# Patient Record
Sex: FEMALE | Race: WHITE | NOT HISPANIC OR LATINO | Employment: UNEMPLOYED | URBAN - METROPOLITAN AREA
[De-identification: names, ages, dates, MRNs, and addresses within clinical notes are randomized per-mention and may not be internally consistent; named-entity substitution may affect disease eponyms.]

---

## 2017-10-22 LAB — HEP C VIRUS AB: 0.1

## 2018-04-27 DIAGNOSIS — Z86.19 HISTORY OF PCR DNA POSITIVE FOR HSV2: ICD-10-CM

## 2018-04-27 DIAGNOSIS — O26.893 RH NEGATIVE STATUS DURING PREGNANCY IN THIRD TRIMESTER: ICD-10-CM

## 2018-04-27 DIAGNOSIS — Z67.91 RH NEGATIVE STATUS DURING PREGNANCY IN THIRD TRIMESTER: ICD-10-CM

## 2018-04-27 DIAGNOSIS — Z86.19 HISTORY OF TRICHOMONIASIS: ICD-10-CM

## 2018-04-27 PROBLEM — O26.899 RH NEGATIVE STATUS DURING PREGNANCY: Status: ACTIVE | Noted: 2018-04-27

## 2018-04-27 PROBLEM — O36.0920: Status: ACTIVE | Noted: 2018-04-27

## 2018-04-27 PROBLEM — B00.9 HERPES SIMPLEX TYPE 2 INFECTION: Status: ACTIVE | Noted: 2018-04-27

## 2018-04-27 RX ORDER — OXYTOCIN/RINGER'S LACTATE 20/1000 ML
2 PLASTIC BAG, INJECTION (ML) INTRAVENOUS CONTINUOUS
Status: DISCONTINUED | OUTPATIENT
Start: 2018-04-28 | End: 2018-05-01 | Stop reason: HOSPADM

## 2018-04-27 RX ORDER — SODIUM CHLORIDE, SODIUM LACTATE, POTASSIUM CHLORIDE, CALCIUM CHLORIDE 600; 310; 30; 20 MG/100ML; MG/100ML; MG/100ML; MG/100ML
INJECTION, SOLUTION INTRAVENOUS CONTINUOUS
Status: DISCONTINUED | OUTPATIENT
Start: 2018-04-27 | End: 2018-05-01 | Stop reason: HOSPADM

## 2018-04-27 RX ORDER — TERBUTALINE SULFATE 1 MG/ML
0.25 INJECTION SUBCUTANEOUS
Status: DISCONTINUED | OUTPATIENT
Start: 2018-04-27 | End: 2018-05-01 | Stop reason: HOSPADM

## 2018-04-27 RX ORDER — OXYTOCIN/RINGER'S LACTATE 20/1000 ML
333 PLASTIC BAG, INJECTION (ML) INTRAVENOUS CONTINUOUS
Status: SHIPPED | OUTPATIENT
Start: 2018-04-27 | End: 2018-04-27

## 2018-06-14 LAB — PAP RECOMMENDATION EXT: NORMAL

## 2019-08-27 ENCOUNTER — HOSPITAL ENCOUNTER (EMERGENCY)
Facility: HOSPITAL | Age: 24
Discharge: HOME | End: 2019-08-27

## 2019-08-27 VITALS
SYSTOLIC BLOOD PRESSURE: 116 MMHG | BODY MASS INDEX: 36.1 KG/M2 | HEIGHT: 67 IN | TEMPERATURE: 98 F | RESPIRATION RATE: 18 BRPM | OXYGEN SATURATION: 98 % | DIASTOLIC BLOOD PRESSURE: 88 MMHG | HEART RATE: 75 BPM | WEIGHT: 230 LBS

## 2019-08-27 DIAGNOSIS — M79.18 LUMBAR MUSCLE PAIN: Primary | ICD-10-CM

## 2019-08-27 PROCEDURE — 99284 EMERGENCY DEPT VISIT MOD MDM: CPT

## 2019-08-27 RX ORDER — CYCLOBENZAPRINE HCL 10 MG
10 TABLET ORAL 3 TIMES DAILY PRN
Qty: 12 TABLET | Refills: 0 | Status: SHIPPED | OUTPATIENT
Start: 2019-08-27 | End: 2019-09-01

## 2019-08-27 RX ORDER — KETOROLAC TROMETHAMINE 10 MG/1
10 TABLET, FILM COATED ORAL EVERY 6 HOURS PRN
Qty: 20 TABLET | Refills: 0 | Status: SHIPPED | OUTPATIENT
Start: 2019-08-27 | End: 2019-09-01

## 2019-08-27 RX ORDER — SERTRALINE HYDROCHLORIDE 100 MG/1
100 TABLET, FILM COATED ORAL DAILY
COMMUNITY
End: 2020-03-13 | Stop reason: CLARIF

## 2019-08-27 RX ORDER — ARIPIPRAZOLE 5 MG/1
5 TABLET ORAL DAILY
COMMUNITY
End: 2020-03-13 | Stop reason: CLARIF

## 2019-08-27 RX ORDER — PROPRANOLOL HYDROCHLORIDE 10 MG/1
10 TABLET ORAL 3 TIMES DAILY
COMMUNITY
End: 2020-03-13 | Stop reason: CLARIF

## 2019-08-27 RX ORDER — OXCARBAZEPINE 150 MG/1
150 TABLET, FILM COATED ORAL 3 TIMES DAILY
COMMUNITY
End: 2020-03-13 | Stop reason: CLARIF

## 2019-08-27 NOTE — ED PROVIDER NOTES
Encounter Date: 8/27/2019       History     Chief Complaint   Patient presents with    Back Pain     Gustavo Perry is a 24 y.o female with PMHx including anxiety, bipolar and depression. She presents to ED with back pain    Patient was shoveling dirt to bury dog yesterday morning. She started with back pain shortly after. She denies specific injury or trauama    She reports constant, sharp pain in right lower back. Pain does not radiate in nature. Pain is worse with weight bearing     She denies urinary symptoms     No fever, body aches or chills    No numbness or tingling. No urinary or bowel incontinence    Lower extremities with normal neurovascular status        Review of patient's allergies indicates:   Allergen Reactions    Sulfa (sulfonamide antibiotics) Hives     Past Medical History:   Diagnosis Date    Anxiety     Bipolar 1 disorder, depressed     Depression      History reviewed. No pertinent surgical history.  History reviewed. No pertinent family history.  Social History     Tobacco Use    Smoking status: Current Every Day Smoker   Substance Use Topics    Alcohol use: Yes    Drug use: Never     Review of Systems   Constitutional: Negative.  Negative for fever.   HENT: Negative.  Negative for sore throat.    Eyes: Negative.    Respiratory: Negative.  Negative for shortness of breath.    Cardiovascular: Negative.  Negative for chest pain.   Gastrointestinal: Negative.  Negative for nausea.   Endocrine: Negative.    Genitourinary: Negative.  Negative for dysuria.   Musculoskeletal: Positive for arthralgias (right back pain). Negative for back pain.   Skin: Negative for rash.   Allergic/Immunologic: Negative.    Neurological: Negative.  Negative for weakness.   Hematological: Negative.  Does not bruise/bleed easily.   Psychiatric/Behavioral: Negative.    All other systems reviewed and are negative.      Physical Exam     Initial Vitals [08/27/19 1336]   BP Pulse Resp Temp SpO2   116/88 75 18 98.4 °F  (36.9 °C) 98 %      MAP       --         Physical Exam    Nursing note and vitals reviewed.  Constitutional: She appears well-developed and well-nourished.   HENT:   Head: Normocephalic.   Eyes: Pupils are equal, round, and reactive to light.   Neck: Normal range of motion.   Cardiovascular: Normal rate, regular rhythm and normal heart sounds.   Pulmonary/Chest: Breath sounds normal.   Musculoskeletal: Normal range of motion. She exhibits tenderness.        Lumbar back: She exhibits tenderness, pain and spasm. She exhibits normal range of motion, no bony tenderness, no swelling, no edema, no deformity, no laceration and normal pulse.        Back:    Neurological: She is alert and oriented to person, place, and time. GCS score is 15. GCS eye subscore is 4. GCS verbal subscore is 5. GCS motor subscore is 6.   Skin: Skin is warm and dry. Capillary refill takes less than 2 seconds.   Psychiatric: She has a normal mood and affect. Her behavior is normal. Judgment and thought content normal.         ED Course   Procedures  Labs Reviewed - No data to display       Imaging Results    None          Medical Decision Making:   Initial Assessment:   Patient with back pain    Patient was shoveling dirt to bury dog yesterday morning. She started with back pain shortly after. She denies specific injury or trauama    She reports constant, sharp pain in right lower back. Pain does not radiate in nature. Pain is worse with weight bearing     She denies urinary symptoms     No fever, body aches or chills    No numbness or tingling. No urinary or bowel incontinence    Lower extremities with normal neurovascular status    Differential Diagnosis:   Lumbar spine bone fracture, muscle strain    ED Management:    Discussed physical exam findings with patient  No acute emergent medical condition identified at this time to warrant further testing/diagnostics  At this time, I believe the patient is clinically stable for discharge.   Patient to  follow up with PCP in 1-2 days.  The patient acknowledges that close follow up with a MD is required after all ER visits  Pt given instructions; take all medications prescribed in the ER as directed.   Patient agrees to comply with all instruction and direction given in the ER  Pt agrees to return to ER if any symptoms reoccur                               Clinical Impression:       ICD-10-CM ICD-9-CM   1. Lumbar muscle pain M79.18 724.2         Disposition:   Disposition: Discharged  Condition: Stable                        Aydee Aggarwal NP  08/27/19 6872

## 2019-11-13 ENCOUNTER — HOSPITAL ENCOUNTER (EMERGENCY)
Facility: HOSPITAL | Age: 24
Discharge: HOME | End: 2019-11-13
Attending: INTERNAL MEDICINE

## 2019-11-13 VITALS
OXYGEN SATURATION: 98 % | BODY MASS INDEX: 37.04 KG/M2 | WEIGHT: 236 LBS | DIASTOLIC BLOOD PRESSURE: 80 MMHG | HEIGHT: 67 IN | SYSTOLIC BLOOD PRESSURE: 130 MMHG | TEMPERATURE: 98 F | HEART RATE: 97 BPM | RESPIRATION RATE: 20 BRPM

## 2019-11-13 DIAGNOSIS — N30.01 ACUTE CYSTITIS WITH HEMATURIA: ICD-10-CM

## 2019-11-13 DIAGNOSIS — M54.6 ACUTE BILATERAL THORACIC BACK PAIN: Primary | ICD-10-CM

## 2019-11-13 LAB
BACTERIA #/AREA URNS HPF: ABNORMAL /HPF
BILIRUB UR QL STRIP: NEGATIVE
CLARITY UR: CLEAR
COLOR UR: YELLOW
GLUCOSE UR QL STRIP: NEGATIVE
HGB UR QL STRIP: ABNORMAL
KETONES UR QL STRIP: NEGATIVE
LEUKOCYTE ESTERASE UR QL STRIP: NEGATIVE
MICROSCOPIC COMMENT: ABNORMAL
NITRITE UR QL STRIP: NEGATIVE
PH UR STRIP: 5 [PH] (ref 5–8)
PROT UR QL STRIP: ABNORMAL
RBC #/AREA URNS HPF: 25 /HPF (ref 0–4)
SP GR UR STRIP: 1.02 (ref 1–1.03)
SQUAMOUS #/AREA URNS HPF: 12 /HPF
URN SPEC COLLECT METH UR: ABNORMAL
UROBILINOGEN UR STRIP-ACNC: NEGATIVE EU/DL
WBC #/AREA URNS HPF: 10 /HPF (ref 0–5)

## 2019-11-13 PROCEDURE — 99284 EMERGENCY DEPT VISIT MOD MDM: CPT | Mod: 25

## 2019-11-13 PROCEDURE — 96372 THER/PROPH/DIAG INJ SC/IM: CPT

## 2019-11-13 PROCEDURE — 63600175 PHARM REV CODE 636 W HCPCS: Performed by: NURSE PRACTITIONER

## 2019-11-13 PROCEDURE — 81000 URINALYSIS NONAUTO W/SCOPE: CPT

## 2019-11-13 RX ORDER — CEPHALEXIN 500 MG/1
500 CAPSULE ORAL 4 TIMES DAILY
Qty: 20 CAPSULE | Refills: 0 | Status: SHIPPED | OUTPATIENT
Start: 2019-11-13 | End: 2019-11-18

## 2019-11-13 RX ORDER — PHENAZOPYRIDINE HYDROCHLORIDE 200 MG/1
200 TABLET, FILM COATED ORAL 3 TIMES DAILY
Qty: 6 TABLET | Refills: 0 | Status: SHIPPED | OUTPATIENT
Start: 2019-11-13 | End: 2019-11-15

## 2019-11-13 RX ORDER — KETOROLAC TROMETHAMINE 30 MG/ML
30 INJECTION, SOLUTION INTRAMUSCULAR; INTRAVENOUS
Status: COMPLETED | OUTPATIENT
Start: 2019-11-13 | End: 2019-11-13

## 2019-11-13 RX ADMIN — KETOROLAC TROMETHAMINE 30 MG: 30 INJECTION, SOLUTION INTRAMUSCULAR; INTRAVENOUS at 01:11

## 2019-11-13 NOTE — ED PROVIDER NOTES
"Encounter Date: 11/13/2019       History     Chief Complaint   Patient presents with    Flank Pain     Patient complaining of left flank pain, also has a knot on her chest x2 months.     Gustavo Perry is a 24 y.o female with no sign PMMHx. She presents to ED with complaint of back pain    Patient reports that she bent over last night to  a heater and felt sudden, sharp pain in back.    Pain to thoracic region does not radiate. Patient reports pain only with movement.    No abdominal or pelvic pain. No N/V/D    She has not attempted to take any over the counter medication for her symptoms    She reports pain with urination. No urgency or frequency    No vaginal discharge. She is currently on her menstrual cycle    No fever, body aches, chills, chest pain, dyspnea or rash    She also reports "lump" on chest for 2 months        Review of patient's allergies indicates:   Allergen Reactions    Sulfa (sulfonamide antibiotics) Hives     Past Medical History:   Diagnosis Date    Anxiety     Bipolar 1 disorder, depressed     Depression      History reviewed. No pertinent surgical history.  History reviewed. No pertinent family history.  Social History     Tobacco Use    Smoking status: Current Every Day Smoker   Substance Use Topics    Alcohol use: Yes    Drug use: Never     Review of Systems   Constitutional: Negative.  Negative for fever.   HENT: Negative.  Negative for sore throat.    Eyes: Negative.    Respiratory: Negative.  Negative for shortness of breath.    Cardiovascular: Negative.  Negative for chest pain.   Gastrointestinal: Negative.  Negative for nausea.   Genitourinary: Positive for flank pain. Negative for dysuria.   Musculoskeletal: Positive for back pain.   Skin: Negative for rash.   Allergic/Immunologic: Negative.    Neurological: Negative.  Negative for weakness.   Hematological: Negative.  Does not bruise/bleed easily.   Psychiatric/Behavioral: Negative.    All other systems reviewed and are " negative.      Physical Exam     Initial Vitals [11/13/19 1230]   BP Pulse Resp Temp SpO2   130/80 97 20 98.4 °F (36.9 °C) 98 %      MAP       --         Physical Exam    Nursing note and vitals reviewed.  Constitutional: She appears well-developed and well-nourished.   HENT:   Head: Normocephalic.   Eyes: Conjunctivae are normal.   Neck: Normal range of motion. Neck supple.   Cardiovascular: Normal rate.   Pulmonary/Chest: Breath sounds normal.   Abdominal: Soft. Bowel sounds are normal. She exhibits no distension. There is tenderness. There is no rebound and no guarding.       Musculoskeletal:        Cervical back: Normal.        Thoracic back: She exhibits tenderness, bony tenderness, pain and spasm. She exhibits normal range of motion, no swelling, no edema, no deformity, no laceration and normal pulse.        Lumbar back: Normal.        Back:    Neurological: She is alert and oriented to person, place, and time.   Skin: Skin is warm. Capillary refill takes less than 2 seconds.        Psychiatric: She has a normal mood and affect. Her behavior is normal. Judgment and thought content normal.         ED Course   Procedures  Labs Reviewed   URINALYSIS, REFLEX TO URINE CULTURE - Abnormal; Notable for the following components:       Result Value    Protein, UA Trace (*)     Occult Blood UA 3+ (*)     All other components within normal limits    Narrative:     Preferred Collection Type->Urine, Clean Catch   URINALYSIS MICROSCOPIC - Abnormal; Notable for the following components:    RBC, UA 25 (*)     WBC, UA 10 (*)     Bacteria Moderate (*)     All other components within normal limits    Narrative:     Preferred Collection Type->Urine, Clean Catch          Imaging Results    None          Medical Decision Making:   Initial Assessment:   With complaint of back pain    Patient reports that she bent over last night to  a heater and felt sudden, sharp pain in back.    Pain to thoracic region does not radiate.  "Patient reports pain only with movement.    No abdominal or pelvic pain. No N/V/D    She has not attempted to take any over the counter medication for her symptoms    She reports pain with urination. No urgency or frequency    No vaginal discharge. She is currently on her menstrual cycle    No fever, body aches, chills, chest pain, dyspnea or rash    She also reports "lump" on chest for 2 months    Differential Diagnosis:   Bone injury, muscle strain, kidney stone      UTI, pyelonephritis   ED Management:  UA microscopic with 10 wbc and 25 RBC- She is on her menstrual cycle     Will start antibiotic for early UTI    Toradol for pain    Discussed physical exam findings with patient  No acute emergent medical condition identified at this time to warrant further testing/diagnostics  At this time, I believe the patient is clinically stable for discharge.   Patient to follow up with PCP in 1-2 days.  The patient acknowledges that close follow up with a MD is required after all ER visits  Pt given instructions; take all medications prescribed in the ER as directed.   Patient agrees to comply with all instruction and direction given in the ER  Pt agrees to return to ER if any symptoms reoccur                                              Clinical Impression:       ICD-10-CM ICD-9-CM   1. Acute bilateral thoracic back pain M54.6 724.1   2. Acute cystitis with hematuria N30.01 595.0                             Aydee Aggarwal NP  11/13/19 1400    "

## 2020-03-13 ENCOUNTER — HOSPITAL ENCOUNTER (EMERGENCY)
Facility: HOSPITAL | Age: 25
Discharge: HOME | End: 2020-03-13
Payer: COMMERCIAL

## 2020-03-13 VITALS
TEMPERATURE: 98 F | DIASTOLIC BLOOD PRESSURE: 87 MMHG | SYSTOLIC BLOOD PRESSURE: 132 MMHG | WEIGHT: 232 LBS | BODY MASS INDEX: 36.41 KG/M2 | HEIGHT: 67 IN | HEART RATE: 92 BPM | OXYGEN SATURATION: 98 % | RESPIRATION RATE: 20 BRPM

## 2020-03-13 DIAGNOSIS — R68.89 FLU-LIKE SYMPTOMS: Primary | ICD-10-CM

## 2020-03-13 LAB
GROUP A STREP, MOLECULAR: NEGATIVE
INFLUENZA A, MOLECULAR: NEGATIVE
INFLUENZA B, MOLECULAR: NEGATIVE
SPECIMEN SOURCE: NORMAL

## 2020-03-13 PROCEDURE — 87502 INFLUENZA DNA AMP PROBE: CPT

## 2020-03-13 PROCEDURE — 87651 STREP A DNA AMP PROBE: CPT

## 2020-03-13 PROCEDURE — 99282 EMERGENCY DEPT VISIT SF MDM: CPT

## 2020-03-13 NOTE — ED PROVIDER NOTES
Encounter Date: 3/13/2020       History   No chief complaint on file.    Gustavo Perry is a 24 y.o female with significant past medical history including anxiety, bipolar and depression. She presents to ED with low-grade fever, cough, congestion, sore throat and fatigue    No wheezing or respiratory distress    No abdominal pain. No nausea, vomiting or diarrhea. She is tolerating fluids well    She is taking any over the counter mediations to treat symptoms    No recent travels or exposure to person with known coronavirus.    She reports that her child have influenza    She has had influenza vaccination this season          Review of patient's allergies indicates:   Allergen Reactions    Sulfa (sulfonamide antibiotics) Hives     Past Medical History:   Diagnosis Date    Anxiety     Bipolar 1 disorder, depressed     Depression      No past surgical history on file.  No family history on file.  Social History     Tobacco Use    Smoking status: Current Every Day Smoker   Substance Use Topics    Alcohol use: Yes    Drug use: Never     Review of Systems   Constitutional: Positive for fatigue and fever. Negative for activity change, appetite change, chills, diaphoresis and unexpected weight change.   HENT: Positive for congestion, postnasal drip, rhinorrhea and sore throat. Negative for dental problem, drooling, ear discharge, ear pain, facial swelling, hearing loss, mouth sores, nosebleeds, sinus pressure, sinus pain and sneezing.    Eyes: Negative.    Respiratory: Positive for cough. Negative for apnea, choking, chest tightness, shortness of breath, wheezing and stridor.    Cardiovascular: Negative.    Gastrointestinal: Negative.    Endocrine: Negative.    Genitourinary: Negative.    Musculoskeletal: Negative.    Allergic/Immunologic: Negative.    Neurological: Negative.    Hematological: Negative.    Psychiatric/Behavioral: Negative.    All other systems reviewed and are negative.      Physical Exam     Initial  Vitals   BP Pulse Resp Temp SpO2   -- -- -- -- --      MAP       --         Physical Exam    Nursing note and vitals reviewed.  Constitutional: She appears well-developed and well-nourished. She is not diaphoretic. No distress.   HENT:   Head: Normocephalic.   Right Ear: External ear normal.   Left Ear: External ear normal.   Nose: Nose normal.   Mouth/Throat: Oropharynx is clear and moist.   Eyes: Conjunctivae are normal.   Neck: Normal range of motion.   Cardiovascular: Normal rate.   Pulmonary/Chest: Breath sounds normal.   Abdominal: Soft. Bowel sounds are normal.   Musculoskeletal: Normal range of motion.   Neurological: She is alert and oriented to person, place, and time. GCS score is 15. GCS eye subscore is 4. GCS verbal subscore is 5. GCS motor subscore is 6.   Skin: Skin is warm. Capillary refill takes less than 2 seconds.   Psychiatric: She has a normal mood and affect. Her behavior is normal. Judgment and thought content normal.         ED Course   Procedures  Labs Reviewed - No data to display       Imaging Results    None          Medical Decision Making:   Initial Assessment:   Patient with low-grade fever, cough, congestion, sore throat and fatigue    No wheezing or respiratory distress    No abdominal pain. No nausea, vomiting or diarrhea. She is tolerating fluids well    She is taking any over the counter mediations to treat symptoms    No recent travels or exposure to person with known coronavirus.    She reports that her child have influenza    She has had influenza vaccination this season      Differential Diagnosis:   URI, strep, influenza, sinusitis pneumonia, bronchitis, coronavirus  ED Management:  Strep and influenza are negative    Discussed the possibility of influenza despite negative test due to recent exposure. Patient declined antiviral    Discussed physical exam findings with patient  No acute emergent medical condition identified at this time to warrant further  testing/diagnostics  At this time, I believe the patient is clinically stable for discharge.   Patient to follow up with PCP in 1-2 days.  The patient acknowledges that close follow up with a MD is required after all ER visits  Pt given instructions; take all medications prescribed in the ER as directed.   Patient agrees to comply with all instruction and direction given in the ER  Pt agrees to return to ER if any symptoms reoccur                                               Clinical Impression:       ICD-10-CM ICD-9-CM   1. Flu-like symptoms R68.89 780.99                                Aydee Aggarwal NP  03/13/20 1952

## 2020-04-17 ENCOUNTER — HOSPITAL ENCOUNTER (EMERGENCY)
Facility: HOSPITAL | Age: 25
Discharge: HOME | End: 2020-04-17
Attending: EMERGENCY MEDICINE
Payer: COMMERCIAL

## 2020-04-17 VITALS
TEMPERATURE: 98 F | WEIGHT: 229 LBS | OXYGEN SATURATION: 98 % | RESPIRATION RATE: 18 BRPM | SYSTOLIC BLOOD PRESSURE: 131 MMHG | DIASTOLIC BLOOD PRESSURE: 78 MMHG | HEIGHT: 67 IN | BODY MASS INDEX: 35.94 KG/M2 | HEART RATE: 92 BPM

## 2020-04-17 DIAGNOSIS — R05.9 COUGH: ICD-10-CM

## 2020-04-17 DIAGNOSIS — R09.89 CHEST CONGESTION: Primary | ICD-10-CM

## 2020-04-17 DIAGNOSIS — B34.9 ACUTE VIRAL SYNDROME: ICD-10-CM

## 2020-04-17 LAB
B-HCG UR QL: NEGATIVE
BACTERIA #/AREA URNS HPF: ABNORMAL /HPF
BILIRUB UR QL STRIP: NEGATIVE
CLARITY UR: CLEAR
COLOR UR: YELLOW
GLUCOSE UR QL STRIP: NEGATIVE
HGB UR QL STRIP: NEGATIVE
INFLUENZA A, MOLECULAR: NEGATIVE
INFLUENZA B, MOLECULAR: NEGATIVE
KETONES UR QL STRIP: NEGATIVE
LEUKOCYTE ESTERASE UR QL STRIP: ABNORMAL
MICROSCOPIC COMMENT: ABNORMAL
NITRITE UR QL STRIP: NEGATIVE
PH UR STRIP: 6 [PH] (ref 5–8)
PROT UR QL STRIP: NEGATIVE
RBC #/AREA URNS HPF: 1 /HPF (ref 0–4)
SARS-COV-2 RDRP RESP QL NAA+PROBE: NEGATIVE
SP GR UR STRIP: 1.01 (ref 1–1.03)
SPECIMEN SOURCE: NORMAL
SQUAMOUS #/AREA URNS HPF: 10 /HPF
URN SPEC COLLECT METH UR: ABNORMAL
UROBILINOGEN UR STRIP-ACNC: NEGATIVE EU/DL
WBC #/AREA URNS HPF: 3 /HPF (ref 0–5)

## 2020-04-17 PROCEDURE — 81000 URINALYSIS NONAUTO W/SCOPE: CPT

## 2020-04-17 PROCEDURE — 71045 X-RAY EXAM CHEST 1 VIEW: CPT | Mod: 26,,, | Performed by: RADIOLOGY

## 2020-04-17 PROCEDURE — 87502 INFLUENZA DNA AMP PROBE: CPT

## 2020-04-17 PROCEDURE — U0002 COVID-19 LAB TEST NON-CDC: HCPCS

## 2020-04-17 PROCEDURE — 99283 EMERGENCY DEPT VISIT LOW MDM: CPT | Mod: 25

## 2020-04-17 PROCEDURE — 71045 X-RAY EXAM CHEST 1 VIEW: CPT | Mod: TC,FY

## 2020-04-17 PROCEDURE — 81025 URINE PREGNANCY TEST: CPT

## 2020-04-17 PROCEDURE — 71045 XR CHEST 1 VIEW: ICD-10-PCS | Mod: 26,,, | Performed by: RADIOLOGY

## 2020-04-17 RX ORDER — BENZONATATE 100 MG/1
100 CAPSULE ORAL 3 TIMES DAILY PRN
Qty: 20 CAPSULE | Refills: 0 | Status: SHIPPED | OUTPATIENT
Start: 2020-04-17 | End: 2020-04-27

## 2020-04-17 NOTE — DISCHARGE INSTRUCTIONS
Take OTC Motrin/Tylenol as needed for pain/fever    Take all medications as prescribed.  Follow-up with your primary care provider as discussed.  Please remember that you had a visit to the emergency room today and this does not substitute as primary care services for ongoing management because emergency services is a snap shot in time.  Should you have any worsening condition that requires emergency services do not hesitate to return to the ER.

## 2020-04-17 NOTE — ED PROVIDER NOTES
Encounter Date: 4/17/2020       History     Chief Complaint   Patient presents with    Influenza     body aches sore throat for several days     23yo WF w/ c/o subjective fever, body aches, cough & congestion x2 days; denies exacerbating or alleviating factors, no OTC meds taken, started slowly & progressing detailed as moderate presently -- denies  headache, dizziness, syncope, vision changes, neck pain, painful/difficult swallowing, chest pain, shortness breath, abdominal pain, nausea/vomiting/diarrhea, hematuria/dysuria -- no previous evaluation has been performed nor has PCP been contacted for today's concerns    Past medical/surgical history, allergies & current medications reviewed with patient    LMP x3 wks ago, normal    COVID-19 SCREENING  Known SARS-CoV2 exposure:  No  State of residence:  [place]  Recent travel out of state:  No  Recent travel outside of the [place]:  No  Length of symptoms:  2 days  Motrin/Tylenol in past 4 hours:  No  Difficulty breathing:  No  Smokes:  Yes      The history is provided by the patient. No  was used.     Review of patient's allergies indicates:   Allergen Reactions    Sulfa (sulfonamide antibiotics) Hives     Past Medical History:   Diagnosis Date    Anxiety     Bipolar 1 disorder, depressed     Depression      History reviewed. No pertinent surgical history.  History reviewed. No pertinent family history.  Social History     Tobacco Use    Smoking status: Current Every Day Smoker   Substance Use Topics    Alcohol use: Yes    Drug use: Never     Review of Systems   Constitutional: Positive for fatigue and fever.   HENT: Positive for congestion. Negative for sore throat.    Respiratory: Negative for shortness of breath.    Cardiovascular: Negative for chest pain.   Gastrointestinal: Negative for nausea.   Genitourinary: Negative for dysuria.   Musculoskeletal: Positive for myalgias. Negative for back pain.   Skin: Negative for rash.   Neurological:  Negative for weakness and headaches.   Hematological: Does not bruise/bleed easily.   All other systems reviewed and are negative.      Physical Exam     Initial Vitals [04/17/20 1045]   BP Pulse Resp Temp SpO2   137/82 95 20 97.5 °F (36.4 °C) 98 %      MAP       --         Physical Exam    Nursing note and vitals reviewed.  Constitutional: She appears well-developed. She is not diaphoretic. No distress.   AF, VSS   HENT:   Head: Normocephalic.   Right Ear: Tympanic membrane, external ear and ear canal normal.   Left Ear: Tympanic membrane, external ear and ear canal normal.   Nose: Mucosal edema (mild) present. Right sinus exhibits no maxillary sinus tenderness and no frontal sinus tenderness. Left sinus exhibits no maxillary sinus tenderness and no frontal sinus tenderness.   Mouth/Throat: Uvula is midline, oropharynx is clear and moist and mucous membranes are normal.   Eyes: Lids are normal.   Neck: Neck supple.   Cardiovascular: Normal rate.   Pulmonary/Chest: Effort normal and breath sounds normal. No respiratory distress.   Abdominal: She exhibits no distension.   Lymphadenopathy:     She has no cervical adenopathy.   Neurological: She is alert.   Skin: No rash noted.   Psychiatric: She has a normal mood and affect.         ED Course   Procedures  Labs Reviewed   URINALYSIS, REFLEX TO URINE CULTURE - Abnormal; Notable for the following components:       Result Value    Leukocytes, UA Trace (*)     All other components within normal limits    Narrative:     Preferred Collection Type->Urine, Clean Catch   URINALYSIS MICROSCOPIC - Abnormal; Notable for the following components:    Bacteria Few (*)     All other components within normal limits    Narrative:     Preferred Collection Type->Urine, Clean Catch   INFLUENZA A & B BY MOLECULAR   PREGNANCY TEST, URINE RAPID   SARS-COV-2 RNA AMPLIFICATION, QUAL          Imaging Results          X-Ray Chest 1 View (Final result)  Result time 04/17/20 12:16:18   Procedure  changed from X-Ray Chest AP Portable     Final result by Nikki Thomas MD (04/17/20 12:16:18)                 Impression:      No acute abnormality.      Electronically signed by: Nikki Thomas  Date:    04/17/2020  Time:    12:16             Narrative:    EXAMINATION:  XR CHEST 1 VIEW    CLINICAL HISTORY:  cough; Cough    TECHNIQUE:  Single frontal view of the chest was performed.    COMPARISON:  None    FINDINGS:  The lungs are clear, with normal appearance of pulmonary vasculature and no pleural effusion or pneumothorax.    The cardiac silhouette is normal in size. The hilar and mediastinal contours are unremarkable.    Bones are intact.                                 Medical Decision Making:   ED Management:  Chest x-ray image reviewed:  No acute abnormality    Lab results reviewed, significant findings:  Flu negative, SARS-CV2 negative, UA is unimpressive    Findings and plan of care discussed with patient:  Cough, congestion, acute viral syndrome; will Rx Tessalon for cough, care instructions given -- further instructions given to follow-up with PCP    All questions answered, strict return precautions given, patient verbalized understanding to all instructions, pleasant visit -- vital signs stable, patient is in no distress at discharge    Disclaimer:  This note was prepared with [employer] voice recognition transcription software. Garbled syntax, mangled pronouns, and other bizarre constructions may be attributed to that software system.                                   Clinical Impression:       ICD-10-CM ICD-9-CM   1. Chest congestion R09.89 786.9   2. Cough R05 786.2   3. Acute viral syndrome B34.9 079.99             ED Disposition Condition    Discharge Stable        ED Prescriptions     Medication Sig Dispense Start Date End Date Auth. Provider    benzonatate (TESSALON) 100 MG capsule Take 1 capsule (100 mg total) by mouth 3 (three) times daily as needed for Cough. 20 capsule  4/17/2020 4/27/2020 Minor Collins NP        Follow-up Information     Follow up With Specialties Details Why Contact Info    PCP  Go to  Monday if symptoms are not improving                                      Minor Collins NP  04/17/20 2033

## 2020-05-05 LAB
ABO + RH BLD: NORMAL
C TRACH RRNA SPEC QL PROBE: NEGATIVE
HBV SURFACE AG SERPL QL IA: NEGATIVE
HIV 1+2 AB+HIV1 P24 AG SERPL QL IA: NEGATIVE
INDIRECT COOMBS: NEGATIVE
N GONORRHOEAE, AMPLIFIED DNA: NEGATIVE
RPR: NON REACTIVE
RUBELLA IMMUNE STATUS: NORMAL

## 2020-06-25 ENCOUNTER — HOSPITAL ENCOUNTER (EMERGENCY)
Facility: HOSPITAL | Age: 25
Discharge: HOME | End: 2020-06-25
Attending: FAMILY MEDICINE
Payer: MEDICAID

## 2020-06-25 VITALS
SYSTOLIC BLOOD PRESSURE: 149 MMHG | DIASTOLIC BLOOD PRESSURE: 92 MMHG | RESPIRATION RATE: 20 BRPM | TEMPERATURE: 98 F | BODY MASS INDEX: 35.79 KG/M2 | WEIGHT: 228 LBS | OXYGEN SATURATION: 98 % | HEIGHT: 67 IN | HEART RATE: 106 BPM

## 2020-06-25 DIAGNOSIS — M79.89 BILATERAL SWELLING OF FEET: ICD-10-CM

## 2020-06-25 DIAGNOSIS — R07.9 CHEST PAIN: ICD-10-CM

## 2020-06-25 DIAGNOSIS — M79.89 BILATERAL HAND SWELLING: ICD-10-CM

## 2020-06-25 DIAGNOSIS — R07.9 RIGHT-SIDED CHEST PAIN: Primary | ICD-10-CM

## 2020-06-25 LAB
ALBUMIN SERPL BCP-MCNC: 3.5 G/DL (ref 3.5–5.2)
ALP SERPL-CCNC: 59 U/L (ref 55–135)
ALT SERPL W/O P-5'-P-CCNC: 12 U/L (ref 10–44)
ANION GAP SERPL CALC-SCNC: 9 MMOL/L (ref 8–16)
AST SERPL-CCNC: 12 U/L (ref 10–40)
BASOPHILS # BLD AUTO: 0.02 K/UL (ref 0–0.2)
BASOPHILS NFR BLD: 0.3 % (ref 0–1.9)
BILIRUB SERPL-MCNC: 0.1 MG/DL (ref 0.1–1)
BUN SERPL-MCNC: 8 MG/DL (ref 6–20)
CALCIUM SERPL-MCNC: 9 MG/DL (ref 8.7–10.5)
CHLORIDE SERPL-SCNC: 107 MMOL/L (ref 95–110)
CO2 SERPL-SCNC: 19 MMOL/L (ref 23–29)
CREAT SERPL-MCNC: 0.6 MG/DL (ref 0.5–1.4)
DIFFERENTIAL METHOD: ABNORMAL
EOSINOPHIL # BLD AUTO: 0.1 K/UL (ref 0–0.5)
EOSINOPHIL NFR BLD: 0.8 % (ref 0–8)
ERYTHROCYTE [DISTWIDTH] IN BLOOD BY AUTOMATED COUNT: 12.7 % (ref 11.5–14.5)
EST. GFR  (AFRICAN AMERICAN): >60 ML/MIN/1.73 M^2
EST. GFR  (NON AFRICAN AMERICAN): >60 ML/MIN/1.73 M^2
GLUCOSE SERPL-MCNC: 96 MG/DL (ref 70–110)
HCT VFR BLD AUTO: 35.8 % (ref 37–48.5)
HGB BLD-MCNC: 12.5 G/DL (ref 12–16)
IMM GRANULOCYTES # BLD AUTO: 0.02 K/UL (ref 0–0.04)
IMM GRANULOCYTES NFR BLD AUTO: 0.3 % (ref 0–0.5)
LYMPHOCYTES # BLD AUTO: 2.5 K/UL (ref 1–4.8)
LYMPHOCYTES NFR BLD: 35.8 % (ref 18–48)
MCH RBC QN AUTO: 29.8 PG (ref 27–31)
MCHC RBC AUTO-ENTMCNC: 34.9 G/DL (ref 32–36)
MCV RBC AUTO: 85 FL (ref 82–98)
MONOCYTES # BLD AUTO: 0.4 K/UL (ref 0.3–1)
MONOCYTES NFR BLD: 5.5 % (ref 4–15)
NEUTROPHILS # BLD AUTO: 4.1 K/UL (ref 1.8–7.7)
NEUTROPHILS NFR BLD: 57.3 % (ref 38–73)
NRBC BLD-RTO: 0 /100 WBC
PLATELET # BLD AUTO: 195 K/UL (ref 150–350)
PMV BLD AUTO: 10.3 FL (ref 9.2–12.9)
POTASSIUM SERPL-SCNC: 3.5 MMOL/L (ref 3.5–5.1)
PROT SERPL-MCNC: 6.7 G/DL (ref 6–8.4)
RBC # BLD AUTO: 4.2 M/UL (ref 4–5.4)
SODIUM SERPL-SCNC: 135 MMOL/L (ref 136–145)
TROPONIN I SERPL DL<=0.01 NG/ML-MCNC: <0.01 NG/ML (ref 0.02–0.5)
WBC # BLD AUTO: 7.1 K/UL (ref 3.9–12.7)

## 2020-06-25 PROCEDURE — 85025 COMPLETE CBC W/AUTO DIFF WBC: CPT

## 2020-06-25 PROCEDURE — 84484 ASSAY OF TROPONIN QUANT: CPT

## 2020-06-25 PROCEDURE — 36415 COLL VENOUS BLD VENIPUNCTURE: CPT

## 2020-06-25 PROCEDURE — 99285 EMERGENCY DEPT VISIT HI MDM: CPT | Mod: 25

## 2020-06-25 PROCEDURE — 71045 X-RAY EXAM CHEST 1 VIEW: CPT | Mod: 26,,, | Performed by: RADIOLOGY

## 2020-06-25 PROCEDURE — 71045 X-RAY EXAM CHEST 1 VIEW: CPT | Mod: TC,FY

## 2020-06-25 PROCEDURE — 80053 COMPREHEN METABOLIC PANEL: CPT

## 2020-06-25 PROCEDURE — 93005 ELECTROCARDIOGRAM TRACING: CPT

## 2020-06-25 PROCEDURE — 71045 XR CHEST AP PORTABLE: ICD-10-PCS | Mod: 26,,, | Performed by: RADIOLOGY

## 2020-06-25 NOTE — ED PROVIDER NOTES
Encounter Date: 2020       History     Chief Complaint   Patient presents with    Chest Pain     Patient complaining of chest pain, leg and hand edema, 13 weeks pregnant.    Leg Swelling    hand edema     26yo female w/ c/o hand & foot swelling starting yesterday, also admits to int right sided CP; PT is 13 wks pregnant w/ US done confirming IUP, feet swelling worsens with in the dependent position improves with elevation, patient states that she has no hand swelling currently, she denies any chest pain currently in states that she is bipolar with depression and anxiety in her past    Denies fever, headache, dizziness, syncope, vision changes, neck pain, painful/difficult swallowing, shortness breath, cough, abdominal pain, nausea/vomiting/diarrhea, hematuria/dysuria, vaginal bleeding/discharge    Patient contacted her OB/GYN who encourage the visit to the ER for today's concerns    Past medical/surgical history, allergies & current medications reviewed with patient -- 13 wks pregnant, due date 2020, A0    Known SARS-CoV2 exposure:  No      The history is provided by the patient. No  was used.     Review of patient's allergies indicates:   Allergen Reactions    Sulfa (sulfonamide antibiotics) Hives     Past Medical History:   Diagnosis Date    Anxiety     Bipolar 1 disorder, depressed     Depression      History reviewed. No pertinent surgical history.  History reviewed. No pertinent family history.  Social History     Tobacco Use    Smoking status: Current Every Day Smoker   Substance Use Topics    Alcohol use: Yes    Drug use: Never     Review of Systems   Constitutional: Negative for fever.   HENT: Negative for sore throat.    Respiratory: Negative for shortness of breath.    Cardiovascular: Positive for chest pain and leg swelling.   Gastrointestinal: Negative for nausea.   Genitourinary: Negative for dysuria.   Musculoskeletal: Negative for back pain.   Skin:  Negative for rash.   Neurological: Negative for weakness and headaches.   Hematological: Does not bruise/bleed easily.   All other systems reviewed and are negative.      Physical Exam     Initial Vitals [06/25/20 1211]   BP Pulse Resp Temp SpO2   (!) 149/92 106 20 98.2 °F (36.8 °C) 98 %      MAP       --         Physical Exam    Nursing note and vitals reviewed.  Constitutional: She is Obese . She does not appear ill. No distress.   AF, , VSS   HENT:   Head: Normocephalic and atraumatic.   Right Ear: External ear normal.   Left Ear: External ear normal.   Nose: Nose normal.   Eyes: Lids are normal.   Neck: Neck supple.   Cardiovascular: Tachycardia present.    Pulses:       Dorsalis pedis pulses are 2+ on the right side and 2+ on the left side.        Posterior tibial pulses are 2+ on the right side and 2+ on the left side.   Pulmonary/Chest: Effort normal and breath sounds normal. No respiratory distress.   Abdominal: Soft. Bowel sounds are normal. She exhibits no distension. There is no abdominal tenderness.   Musculoskeletal:      Right lower leg: She exhibits no swelling. No edema.      Left lower leg: She exhibits no swelling. No edema.   Neurological: She is alert.   Skin: Skin is warm. Capillary refill takes less than 2 seconds. No rash noted.   Psychiatric:   Patient is somewhat flat affected         ED Course   Procedures  Labs Reviewed   CBC W/ AUTO DIFFERENTIAL - Abnormal; Notable for the following components:       Result Value    Hematocrit 35.8 (*)     All other components within normal limits   COMPREHENSIVE METABOLIC PANEL - Abnormal; Notable for the following components:    Sodium 135 (*)     CO2 19 (*)     All other components within normal limits   TROPONIN I - Abnormal; Notable for the following components:    Troponin I <0.01 (*)     All other components within normal limits     EKG Readings: (Independently Interpreted)   Initial Reading: No STEMI. Rhythm: Normal Sinus Rhythm. Heart Rate:  77. Ectopy: No Ectopy. Conduction: Normal. ST Segments: Normal ST Segments. T Waves: Normal. Axis: Normal. Other Impression: Normal EKG   Time:  1221  No previous EKG available       Imaging Results          X-Ray Chest AP Portable (Final result)  Result time 06/25/20 12:46:38    Final result by Nikki Thomas MD (06/25/20 12:46:38)                 Impression:      No acute abnormality.      Electronically signed by: Nikki Thomas  Date:    06/25/2020  Time:    12:46             Narrative:    EXAMINATION:  XR CHEST AP PORTABLE    CLINICAL HISTORY:  Chest pain, unspecified    TECHNIQUE:  Single frontal view of the chest was performed.    COMPARISON:  04/17/2020    FINDINGS:  The lungs are clear, with normal appearance of pulmonary vasculature and no pleural effusion or pneumothorax.    The cardiac silhouette is normal in size. The hilar and mediastinal contours are unremarkable.    Bones are intact.                                 Medical Decision Making:   ED Management:  Exam is unimpressive    Chest x-ray image/report reviewed:  No acute abnormality    Lab results reviewed, significant findings:  WBC WNL, troponin negative -- no critical findings    Findings and plan of care discussed with patient:  Right-sided chest pain (thought to be related to anxiety), hand and feet swelling (related to pregnancy); patient struck to to continue taking her prenatal vitamin daily, care instructions given -- further instructions given to follow-up with OB/GYN    All questions answered, strict return precautions given, patient verbalized understanding to all instructions, pleasant visit -- vital signs stable, patient is in no distress at discharge    Disclaimer:  This note was prepared with [employer] Naturally Speaking voice recognition transcription software. Garbled syntax, mangled pronouns, and other bizarre constructions may be attributed to that software system.                                   Clinical Impression:        ICD-10-CM ICD-9-CM   1. Right-sided chest pain  R07.9 786.50   2. Chest pain  R07.9 786.50   3. Bilateral swelling of feet  M79.89 729.81   4. Bilateral hand swelling  M79.89 729.81             ED Disposition Condition    Discharge Stable        ED Prescriptions     None        Follow-up Information     Follow up With Specialties Details Why Contact Info    OB/GYN  Call today To schedule follow-up appointment within the next 3-5 days                                      Minor Collins NP  06/25/20 6850       Minor Collins NP  06/25/20 7459

## 2020-06-25 NOTE — DISCHARGE INSTRUCTIONS
ContinueTaking daily prenatal vitamin.  Take OTC Tylenol as needed for pain.  Please remember that you had a visit to the emergency room today and this does not substitute as primary care services for ongoing management because emergency services is a snap shot in time.  Should you have any worsening condition that requires emergency services do not hesitate to return to the ER.    COVID-19 TESTING  IF YOU DESIRE TO BE TESTED, CALL TO SCHEDULE AN APPOINTMENT:  Hot Line [phone]  [address] [place] [address], [place] [address]  [facility]  Hours 8am-5pm Monday Through Friday

## 2020-08-30 ENCOUNTER — NURSE TRIAGE (OUTPATIENT)
Dept: ADMINISTRATIVE | Facility: CLINIC | Age: 25
End: 2020-08-30

## 2020-08-30 NOTE — TELEPHONE ENCOUNTER
S/s started 2 days ago. Pt asking what medicines are ok to take while pregnant.  Reports is 6 months pregnant.      Dr Raghav Bose- sees him tomorrow.        Mild pain to cheeks.+ sinus congestion. No fever. No breathing pattern changes.+ Sneezing. No coughing. + earache    protocol advice given and pt verbalizes understanding.     Reason for Disposition   Earache   [1] SEVERE sore throat AND [2] present > 24 hours    Additional Information   Negative: Severe difficulty breathing (e.g., struggling for each breath, speaks in single words)   Negative: Sounds like a life-threatening emergency to the triager   Negative: [1] Difficulty breathing AND [2] not severe AND [3] not from stuffy nose (e.g., not relieved by cleaning out the nose)   Negative: Patient sounds very sick or weak to the triager   Negative: Fever > 104 F (40 C)   Negative: [1] Fever > 101 F (38.3 C) AND [2] age > 60   Negative: [1] Fever > 100.0 F (37.8 C) AND [2] bedridden (e.g., nursing home patient, CVA, chronic illness, recovering from surgery)   Negative: [1] Fever > 100.0 F (37.8 C) AND [2] diabetes mellitus or weak immune system (e.g., HIV positive, cancer chemo, splenectomy, organ transplant, chronic steroids)   Negative: Fever present > 3 days (72 hours)   Negative: [1] Fever returns after gone for over 24 hours AND [2] symptoms worse or not improved   Negative: [1] Sinus pain (not just congestion) AND [2] fever    Protocols used: COMMON COLD-A-AH

## 2020-08-30 NOTE — LETTER
[facility]  [address]  [facility] [address]  Phone: [phone]  Fax: [phone]   Gustavo Perry  1995    Pt called nurse line asking what meds are ok to take with cold s/s. instructed to call pharmacist to ask.     S/s started 2 days ago. Pt asking what medicines are ok to take while pregnant.  Reports is 6 months pregnant.      Dr Raghav Bose- sees him tomorrow.        Mild pain to cheeks.+ sinus congestion. No fever. No breathing pattern changes.+ Sneezing. No coughing. + earache    protocol advice given and pt verbalizes understanding.     Reason for Disposition   Earache   [1] SEVERE sore throat AND [2] present > 24 hours    Additional Information   Negative: Severe difficulty breathing (e.g., struggling for each breath, speaks in single words)   Negative: Sounds like a life-threatening emergency to the triager   Negative: [1] Difficulty breathing AND [2] not severe AND [3] not from stuffy nose (e.g., not relieved by cleaning out the nose)   Negative: Patient sounds very sick or weak to the triager   Negative: Fever > 104 F (40 C)   Negative: [1] Fever > 101 F (38.3 C) AND [2] age > 60   Negative: [1] Fever > 100.0 F (37.8 C) AND [2] bedridden (e.g., nursing home patient, CVA, chronic illness, recovering from surgery)   Negative: [1] Fever > 100.0 F (37.8 C) AND [2] diabetes mellitus or weak immune system (e.g., HIV positive, cancer chemo, splenectomy, organ transplant, chronic steroids)   Negative: Fever present > 3 days (72 hours)   Negative: [1] Fever returns after gone for over 24 hours AND [2] symptoms worse or not improved   Negative: [1] Sinus pain (not just congestion) AND [2] fever    Protocols used: COMMON COLD-A-AH

## 2020-11-18 LAB — PRENATAL STREP B CULTURE: POSITIVE

## 2020-12-17 DIAGNOSIS — Z01.818 OTHER SPECIFIED PRE-OPERATIVE EXAMINATION: Primary | ICD-10-CM

## 2020-12-19 ENCOUNTER — LAB VISIT (OUTPATIENT)
Dept: PRIMARY CARE CLINIC | Facility: CLINIC | Age: 25
End: 2020-12-19
Payer: COMMERCIAL

## 2020-12-19 DIAGNOSIS — Z01.818 OTHER SPECIFIED PRE-OPERATIVE EXAMINATION: ICD-10-CM

## 2020-12-19 PROCEDURE — U0003 INFECTIOUS AGENT DETECTION BY NUCLEIC ACID (DNA OR RNA); SEVERE ACUTE RESPIRATORY SYNDROME CORONAVIRUS 2 (SARS-COV-2) (CORONAVIRUS DISEASE [COVID-19]), AMPLIFIED PROBE TECHNIQUE, MAKING USE OF HIGH THROUGHPUT TECHNOLOGIES AS DESCRIBED BY CMS-2020-01-R: HCPCS

## 2020-12-20 LAB — SARS-COV-2 RNA RESP QL NAA+PROBE: NOT DETECTED

## 2020-12-22 ENCOUNTER — HOSPITAL ENCOUNTER (INPATIENT)
Facility: HOSPITAL | Age: 25
LOS: 2 days | Discharge: HOME | End: 2020-12-24
Attending: SPECIALIST | Admitting: SPECIALIST
Payer: MEDICAID

## 2020-12-22 ENCOUNTER — ANESTHESIA EVENT (OUTPATIENT)
Dept: OBSTETRICS AND GYNECOLOGY | Facility: HOSPITAL | Age: 25
End: 2020-12-22
Payer: MEDICAID

## 2020-12-22 ENCOUNTER — ANESTHESIA (OUTPATIENT)
Dept: OBSTETRICS AND GYNECOLOGY | Facility: HOSPITAL | Age: 25
End: 2020-12-22
Payer: MEDICAID

## 2020-12-22 DIAGNOSIS — Z34.90 ENCOUNTER FOR ELECTIVE INDUCTION OF LABOR: ICD-10-CM

## 2020-12-22 LAB
ABO + RH BLD: NORMAL
AMPHET+METHAMPHET UR QL: NEGATIVE
BACTERIA #/AREA URNS HPF: ABNORMAL /HPF
BARBITURATES UR QL SCN>200 NG/ML: NEGATIVE
BASOPHILS # BLD AUTO: 0.03 K/UL (ref 0–0.2)
BASOPHILS NFR BLD: 0.4 % (ref 0–1.9)
BENZODIAZ UR QL SCN>200 NG/ML: NEGATIVE
BILIRUB UR QL STRIP: NEGATIVE
BLD GP AB SCN CELLS X3 SERPL QL: NORMAL
BZE UR QL SCN: NEGATIVE
CANNABINOIDS UR QL SCN: NEGATIVE
CLARITY UR: ABNORMAL
COLOR UR: YELLOW
CREAT UR-MCNC: 153 MG/DL (ref 15–325)
DIFFERENTIAL METHOD: ABNORMAL
EOSINOPHIL # BLD AUTO: 0.1 K/UL (ref 0–0.5)
EOSINOPHIL NFR BLD: 0.7 % (ref 0–8)
ERYTHROCYTE [DISTWIDTH] IN BLOOD BY AUTOMATED COUNT: 13.4 % (ref 11.5–14.5)
GLUCOSE UR QL STRIP: NEGATIVE
HCT VFR BLD AUTO: 35.3 % (ref 37–48.5)
HGB BLD-MCNC: 11.3 G/DL (ref 12–16)
HGB UR QL STRIP: NEGATIVE
HYALINE CASTS #/AREA URNS LPF: 25 /LPF
IMM GRANULOCYTES # BLD AUTO: 0.02 K/UL (ref 0–0.04)
IMM GRANULOCYTES NFR BLD AUTO: 0.2 % (ref 0–0.5)
KETONES UR QL STRIP: NEGATIVE
LEUKOCYTE ESTERASE UR QL STRIP: ABNORMAL
LYMPHOCYTES # BLD AUTO: 2.6 K/UL (ref 1–4.8)
LYMPHOCYTES NFR BLD: 30.7 % (ref 18–48)
MCH RBC QN AUTO: 27.8 PG (ref 27–31)
MCHC RBC AUTO-ENTMCNC: 32 G/DL (ref 32–36)
MCV RBC AUTO: 87 FL (ref 82–98)
MICROSCOPIC COMMENT: ABNORMAL
MONOCYTES # BLD AUTO: 0.5 K/UL (ref 0.3–1)
MONOCYTES NFR BLD: 5.8 % (ref 4–15)
NEUTROPHILS # BLD AUTO: 5.2 K/UL (ref 1.8–7.7)
NEUTROPHILS NFR BLD: 62.2 % (ref 38–73)
NITRITE UR QL STRIP: NEGATIVE
NRBC BLD-RTO: 0 /100 WBC
OPIATES UR QL SCN: NEGATIVE
PCP UR QL SCN>25 NG/ML: NEGATIVE
PCP UR QL SCN>25 NG/ML: NEGATIVE
PH UR STRIP: 7 [PH] (ref 5–8)
PLATELET # BLD AUTO: 255 K/UL (ref 150–350)
PMV BLD AUTO: 11 FL (ref 9.2–12.9)
PROT UR QL STRIP: ABNORMAL
RBC # BLD AUTO: 4.07 M/UL (ref 4–5.4)
RBC #/AREA URNS HPF: 4 /HPF (ref 0–4)
RPR SER QL: NORMAL
SP GR UR STRIP: 1.02 (ref 1–1.03)
SQUAMOUS #/AREA URNS HPF: 20 /HPF
TOXICOLOGY INFORMATION: NORMAL
URN SPEC COLLECT METH UR: ABNORMAL
UROBILINOGEN UR STRIP-ACNC: NEGATIVE EU/DL
WBC # BLD AUTO: 8.31 K/UL (ref 3.9–12.7)
WBC #/AREA URNS HPF: 5 /HPF (ref 0–5)

## 2020-12-22 PROCEDURE — 27200710 HC EPIDURAL INFUSION PUMP SET: Performed by: ANESTHESIOLOGY

## 2020-12-22 PROCEDURE — 63600175 PHARM REV CODE 636 W HCPCS: Performed by: SPECIALIST

## 2020-12-22 PROCEDURE — 85025 COMPLETE CBC W/AUTO DIFF WBC: CPT

## 2020-12-22 PROCEDURE — 86901 BLOOD TYPING SEROLOGIC RH(D): CPT

## 2020-12-22 PROCEDURE — C1751 CATH, INF, PER/CENT/MIDLINE: HCPCS | Performed by: ANESTHESIOLOGY

## 2020-12-22 PROCEDURE — 80307 DRUG TEST PRSMV CHEM ANLYZR: CPT

## 2020-12-22 PROCEDURE — 25000003 PHARM REV CODE 250: Performed by: SPECIALIST

## 2020-12-22 PROCEDURE — 12000002 HC ACUTE/MED SURGE SEMI-PRIVATE ROOM

## 2020-12-22 PROCEDURE — 25000003 PHARM REV CODE 250: Performed by: ANESTHESIOLOGY

## 2020-12-22 PROCEDURE — 37000008 HC ANESTHESIA 1ST 15 MINUTES: Performed by: SPECIALIST

## 2020-12-22 PROCEDURE — 71000033 HC RECOVERY, INTIAL HOUR: Performed by: SPECIALIST

## 2020-12-22 PROCEDURE — 36000685 HC CESAREAN SECTION LEVEL I

## 2020-12-22 PROCEDURE — 63600175 PHARM REV CODE 636 W HCPCS: Performed by: ANESTHESIOLOGY

## 2020-12-22 PROCEDURE — 71000039 HC RECOVERY, EACH ADD'L HOUR: Performed by: SPECIALIST

## 2020-12-22 PROCEDURE — 62326 NJX INTERLAMINAR LMBR/SAC: CPT | Performed by: ANESTHESIOLOGY

## 2020-12-22 PROCEDURE — 81001 URINALYSIS AUTO W/SCOPE: CPT

## 2020-12-22 PROCEDURE — 37000009 HC ANESTHESIA EA ADD 15 MINS: Performed by: SPECIALIST

## 2020-12-22 PROCEDURE — 86592 SYPHILIS TEST NON-TREP QUAL: CPT

## 2020-12-22 RX ORDER — SODIUM CHLORIDE 9 MG/ML
INJECTION, SOLUTION INTRAVENOUS
Status: DISCONTINUED | OUTPATIENT
Start: 2020-12-22 | End: 2020-12-22

## 2020-12-22 RX ORDER — ONDANSETRON 2 MG/ML
INJECTION INTRAMUSCULAR; INTRAVENOUS
Status: DISCONTINUED | OUTPATIENT
Start: 2020-12-22 | End: 2020-12-22

## 2020-12-22 RX ORDER — SODIUM CHLORIDE, SODIUM LACTATE, POTASSIUM CHLORIDE, CALCIUM CHLORIDE 600; 310; 30; 20 MG/100ML; MG/100ML; MG/100ML; MG/100ML
INJECTION, SOLUTION INTRAVENOUS CONTINUOUS
Status: DISCONTINUED | OUTPATIENT
Start: 2020-12-22 | End: 2020-12-22

## 2020-12-22 RX ORDER — CALCIUM CARBONATE 200(500)MG
500 TABLET,CHEWABLE ORAL 3 TIMES DAILY PRN
Status: DISCONTINUED | OUTPATIENT
Start: 2020-12-22 | End: 2020-12-22

## 2020-12-22 RX ORDER — DIPHENHYDRAMINE HYDROCHLORIDE 50 MG/ML
12.5 INJECTION INTRAMUSCULAR; INTRAVENOUS EVERY 4 HOURS PRN
Status: DISCONTINUED | OUTPATIENT
Start: 2020-12-22 | End: 2020-12-23

## 2020-12-22 RX ORDER — FENTANYL/BUPIVACAINE/NS/PF 2-625MCG/1
14 PLASTIC BAG, INJECTION (ML) INJECTION CONTINUOUS
Status: DISCONTINUED | OUTPATIENT
Start: 2020-12-22 | End: 2020-12-23

## 2020-12-22 RX ORDER — CEFOXITIN SODIUM 2 G/50ML
2 INJECTION, SOLUTION INTRAVENOUS
Status: COMPLETED | OUTPATIENT
Start: 2020-12-22 | End: 2020-12-23

## 2020-12-22 RX ORDER — BUTORPHANOL TARTRATE 2 MG/ML
2 INJECTION INTRAMUSCULAR; INTRAVENOUS
Status: DISCONTINUED | OUTPATIENT
Start: 2020-12-22 | End: 2020-12-22

## 2020-12-22 RX ORDER — OXYTOCIN-SODIUM CHLORIDE 0.9% IV SOLN 30 UNIT/500ML 30-0.9/5 UT/ML-%
0-20 SOLUTION INTRAVENOUS CONTINUOUS
Status: DISCONTINUED | OUTPATIENT
Start: 2020-12-22 | End: 2020-12-22

## 2020-12-22 RX ORDER — ACETAMINOPHEN 10 MG/ML
INJECTION, SOLUTION INTRAVENOUS
Status: DISCONTINUED | OUTPATIENT
Start: 2020-12-22 | End: 2020-12-22

## 2020-12-22 RX ORDER — CEFOXITIN SODIUM 2 G/50ML
INJECTION, SOLUTION INTRAVENOUS
Status: DISCONTINUED | OUTPATIENT
Start: 2020-12-22 | End: 2020-12-22

## 2020-12-22 RX ORDER — ONDANSETRON 2 MG/ML
4 INJECTION INTRAMUSCULAR; INTRAVENOUS EVERY 6 HOURS PRN
Status: DISCONTINUED | OUTPATIENT
Start: 2020-12-22 | End: 2020-12-23

## 2020-12-22 RX ORDER — ONDANSETRON 4 MG/1
8 TABLET, ORALLY DISINTEGRATING ORAL EVERY 8 HOURS PRN
Status: DISCONTINUED | OUTPATIENT
Start: 2020-12-22 | End: 2020-12-22

## 2020-12-22 RX ORDER — ONDANSETRON 2 MG/ML
4 INJECTION INTRAMUSCULAR; INTRAVENOUS EVERY 6 HOURS PRN
Status: DISCONTINUED | OUTPATIENT
Start: 2020-12-22 | End: 2020-12-24 | Stop reason: HOSPADM

## 2020-12-22 RX ORDER — DIPHENHYDRAMINE HYDROCHLORIDE 50 MG/ML
25 INJECTION INTRAMUSCULAR; INTRAVENOUS EVERY 4 HOURS PRN
Status: DISCONTINUED | OUTPATIENT
Start: 2020-12-22 | End: 2020-12-24 | Stop reason: HOSPADM

## 2020-12-22 RX ORDER — LIDOCAINE HCL/EPINEPHRINE/PF 2%-1:200K
VIAL (ML) INJECTION
Status: DISCONTINUED | OUTPATIENT
Start: 2020-12-22 | End: 2020-12-22

## 2020-12-22 RX ORDER — HYDROMORPHONE HYDROCHLORIDE 1 MG/ML
2 INJECTION, SOLUTION INTRAMUSCULAR; INTRAVENOUS; SUBCUTANEOUS
Status: DISCONTINUED | OUTPATIENT
Start: 2020-12-22 | End: 2020-12-24 | Stop reason: HOSPADM

## 2020-12-22 RX ORDER — EPHEDRINE SULFATE 50 MG/ML
10 INJECTION, SOLUTION INTRAVENOUS ONCE AS NEEDED
Status: DISCONTINUED | OUTPATIENT
Start: 2020-12-22 | End: 2020-12-23

## 2020-12-22 RX ORDER — MORPHINE SULFATE 0.5 MG/ML
INJECTION, SOLUTION EPIDURAL; INTRATHECAL; INTRAVENOUS
Status: DISCONTINUED | OUTPATIENT
Start: 2020-12-22 | End: 2020-12-22

## 2020-12-22 RX ORDER — SODIUM CHLORIDE, SODIUM LACTATE, POTASSIUM CHLORIDE, CALCIUM CHLORIDE 600; 310; 30; 20 MG/100ML; MG/100ML; MG/100ML; MG/100ML
INJECTION, SOLUTION INTRAVENOUS CONTINUOUS
Status: DISCONTINUED | OUTPATIENT
Start: 2020-12-22 | End: 2020-12-24 | Stop reason: HOSPADM

## 2020-12-22 RX ORDER — NALBUPHINE HYDROCHLORIDE 10 MG/ML
5 INJECTION, SOLUTION INTRAMUSCULAR; INTRAVENOUS; SUBCUTANEOUS EVERY 4 HOURS PRN
Status: DISCONTINUED | OUTPATIENT
Start: 2020-12-22 | End: 2020-12-24 | Stop reason: HOSPADM

## 2020-12-22 RX ORDER — BUTORPHANOL TARTRATE 2 MG/ML
1 INJECTION INTRAMUSCULAR; INTRAVENOUS
Status: DISCONTINUED | OUTPATIENT
Start: 2020-12-22 | End: 2020-12-22

## 2020-12-22 RX ORDER — OXYCODONE HYDROCHLORIDE 5 MG/1
10 TABLET ORAL EVERY 4 HOURS PRN
Status: DISCONTINUED | OUTPATIENT
Start: 2020-12-22 | End: 2020-12-24 | Stop reason: HOSPADM

## 2020-12-22 RX ORDER — SIMETHICONE 80 MG
1 TABLET,CHEWABLE ORAL 4 TIMES DAILY PRN
Status: DISCONTINUED | OUTPATIENT
Start: 2020-12-22 | End: 2020-12-22

## 2020-12-22 RX ORDER — MISOPROSTOL 100 UG/1
600 TABLET ORAL
Status: DISCONTINUED | OUTPATIENT
Start: 2020-12-22 | End: 2020-12-22

## 2020-12-22 RX ORDER — ROPIVACAINE HYDROCHLORIDE 2 MG/ML
INJECTION, SOLUTION EPIDURAL; INFILTRATION
Status: DISCONTINUED | OUTPATIENT
Start: 2020-12-22 | End: 2020-12-22

## 2020-12-22 RX ORDER — MIDAZOLAM HYDROCHLORIDE 1 MG/ML
INJECTION INTRAMUSCULAR; INTRAVENOUS
Status: DISCONTINUED | OUTPATIENT
Start: 2020-12-22 | End: 2020-12-22

## 2020-12-22 RX ORDER — NALOXONE HCL 0.4 MG/ML
0.4 VIAL (ML) INJECTION SEE ADMIN INSTRUCTIONS
Status: DISCONTINUED | OUTPATIENT
Start: 2020-12-22 | End: 2020-12-23

## 2020-12-22 RX ADMIN — ONDANSETRON 4 MG: 2 INJECTION INTRAMUSCULAR; INTRAVENOUS at 08:12

## 2020-12-22 RX ADMIN — LIDOCAINE HYDROCHLORIDE,EPINEPHRINE BITARTRATE 3 ML: 20; .005 INJECTION, SOLUTION EPIDURAL; INFILTRATION; INTRACAUDAL; PERINEURAL at 06:12

## 2020-12-22 RX ADMIN — MORPHINE SULFATE 3 MG: 0.5 INJECTION, SOLUTION EPIDURAL; INTRATHECAL; INTRAVENOUS at 06:12

## 2020-12-22 RX ADMIN — DEXTROSE 2.5 MILLION UNITS: 50 INJECTION, SOLUTION INTRAVENOUS at 01:12

## 2020-12-22 RX ADMIN — Medication: at 06:12

## 2020-12-22 RX ADMIN — ROPIVACAINE HYDROCHLORIDE 5 ML: 2 INJECTION, SOLUTION EPIDURAL; INFILTRATION at 03:12

## 2020-12-22 RX ADMIN — PENICILLIN G POTASSIUM 5 MILLION UNITS: 5000000 INJECTION, POWDER, FOR SOLUTION INTRAMUSCULAR; INTRAVENOUS at 10:12

## 2020-12-22 RX ADMIN — ONDANSETRON 4 MG: 2 INJECTION INTRAMUSCULAR; INTRAVENOUS at 06:12

## 2020-12-22 RX ADMIN — BUTORPHANOL TARTRATE 1 MG: 2 INJECTION, SOLUTION INTRAMUSCULAR; INTRAVENOUS at 08:12

## 2020-12-22 RX ADMIN — LIDOCAINE HYDROCHLORIDE,EPINEPHRINE BITARTRATE 4 ML: 20; .005 INJECTION, SOLUTION EPIDURAL; INFILTRATION; INTRACAUDAL; PERINEURAL at 06:12

## 2020-12-22 RX ADMIN — SODIUM CHLORIDE, SODIUM LACTATE, POTASSIUM CHLORIDE, AND CALCIUM CHLORIDE: .6; .31; .03; .02 INJECTION, SOLUTION INTRAVENOUS at 10:12

## 2020-12-22 RX ADMIN — SODIUM CHLORIDE, SODIUM LACTATE, POTASSIUM CHLORIDE, AND CALCIUM CHLORIDE 1000 ML: .6; .31; .03; .02 INJECTION, SOLUTION INTRAVENOUS at 02:12

## 2020-12-22 RX ADMIN — CEFOXITIN SODIUM 2 G: 2 INJECTION, SOLUTION INTRAVENOUS at 06:12

## 2020-12-22 RX ADMIN — MORPHINE SULFATE 2.5 MG: 0.5 INJECTION, SOLUTION EPIDURAL; INTRATHECAL; INTRAVENOUS at 06:12

## 2020-12-22 RX ADMIN — Medication 2 MILLI-UNITS/MIN: at 10:12

## 2020-12-22 RX ADMIN — MIDAZOLAM HYDROCHLORIDE 2 MG: 1 INJECTION, SOLUTION INTRAMUSCULAR; INTRAVENOUS at 06:12

## 2020-12-22 RX ADMIN — ACETAMINOPHEN 1000 MG: 10 INJECTION, SOLUTION INTRAVENOUS at 06:12

## 2020-12-22 RX ADMIN — SODIUM CHLORIDE, SODIUM LACTATE, POTASSIUM CHLORIDE, AND CALCIUM CHLORIDE 750 ML: .6; .31; .03; .02 INJECTION, SOLUTION INTRAVENOUS at 06:12

## 2020-12-22 RX ADMIN — HYDROMORPHONE HYDROCHLORIDE 1 MG: 1 INJECTION, SOLUTION INTRAMUSCULAR; INTRAVENOUS; SUBCUTANEOUS at 10:12

## 2020-12-22 RX ADMIN — MORPHINE SULFATE 2 MG: 0.5 INJECTION, SOLUTION EPIDURAL; INTRATHECAL; INTRAVENOUS at 06:12

## 2020-12-22 NOTE — ANESTHESIA PREPROCEDURE EVALUATION
12/22/2020  Gustavo Perry is a 25 y.o., female.    Anesthesia Evaluation    I have reviewed the Patient Summary Reports.    I have reviewed the Nursing Notes. I have reviewed the NPO Status.   I have reviewed the Medications.     Review of Systems  Anesthesia Hx:  Denies Family Hx of Anesthesia complications.   Denies Personal Hx of Anesthesia complications.   Social:  Non-Smoker, No Alcohol Use    Hematology/Oncology:  Hematology Normal   Oncology Normal     EENT/Dental:EENT/Dental Normal   Cardiovascular:  Cardiovascular Normal     Pulmonary:  Pulmonary Normal    Renal/:  Renal/ Normal     Hepatic/GI:   GERD    Musculoskeletal:   occ LBP, even when not pregnant. TMJ pops - no Tx.   Neurological:   occ sciatica with pregnancy   Endocrine:  Endocrine Normal    Psych:   Psychiatric History (bipolar) anxiety depression          Physical Exam  General:  Well nourished    Airway/Jaw/Neck:  Airway Findings: Mouth Opening: Normal Tongue: Normal  Mallampati: I  TM Distance: Normal, at least 6 cm  Jaw/Neck Findings:  Neck ROM: Normal ROM      Dental:  Dental Findings: In tact   Chest/Lungs:  Chest/Lungs Findings: Clear to auscultation, Normal Respiratory Rate     Heart/Vascular:  Heart Findings: Rate: Normal  Rhythm: Regular Rhythm  Sounds: Normal  Heart murmur: negative       Mental Status:  Mental Status Findings:  Cooperative, Alert and Oriented         Anesthesia Plan  Type of Anesthesia, risks & benefits discussed:  Anesthesia Type:  epidural  Patient's Preference:   Intra-op Monitoring Plan: standard ASA monitors  Intra-op Monitoring Plan Comments:   Post Op Pain Control Plan:   Post Op Pain Control Plan Comments:   Induction:    Beta Blocker:  Patient is not currently on a Beta-Blocker (No further documentation required).       Informed Consent: Patient understands risks and agrees with Anesthesia plan.   Questions answered. Anesthesia consent signed with patient.  ASA Score: 2     Day of Surgery Review of History & Physical:            Ready For Surgery From Anesthesia Perspective.

## 2020-12-22 NOTE — ANESTHESIA PROCEDURE NOTES
Epidural    Patient location during procedure: OB   Reason for block: primary anesthetic   Diagnosis: IUP   Start time: 12/22/2020 3:30 PM  Timeout: 12/22/2020 3:30 PM  End time: 12/22/2020 3:45 PM    Staffing  Performing Provider: Bob Vásquez MD  Authorizing Provider: Bob Vásquez MD        Preanesthetic Checklist  Completed: patient identified, site marked, surgical consent, pre-op evaluation, timeout performed, IV checked, risks and benefits discussed, monitors and equipment checked, anesthesia consent given, hand hygiene performed and patient being monitored  Preparation  Patient position: sitting  Prep: Betadine  Patient monitoring: ECG and Blood Pressure  Epidural  Skin Anesthetic: lidocaine 1%  Skin Wheal: 3 mL  Administration type: continuous  Approach: midline  Interspace: L3-4    Injection technique: LETITIA air  Needle and Epidural Catheter  Needle type: Tuohy   Needle gauge: 17  Needle length: 3.5 inches  Needle insertion depth: 6 cm  Catheter type: springwound and multi-orifice  Catheter size: 19 G  Catheter at skin depth: 11 cm  Test dose: 5 mL of lidocaine 1.5% with Epi 1-to-200,000  Additional Documentation: incremental injection, no paresthesia on injection, no significant pain on injection, negative aspiration for heme and CSF, no signs/symptoms of IV or SA injection and no significant complaints from patient  Needle localization: anatomical landmarks  Assessment  Ease of block: easy  Patient's tolerance of the procedure: comfortable throughout block and no complaintsNo inadvertent dural puncture with Tuohy.  Dural puncture not performed with spinal needle

## 2020-12-23 ENCOUNTER — ANESTHESIA (OUTPATIENT)
Dept: OBSTETRICS AND GYNECOLOGY | Facility: HOSPITAL | Age: 25
End: 2020-12-23

## 2020-12-23 ENCOUNTER — ANESTHESIA EVENT (OUTPATIENT)
Dept: OBSTETRICS AND GYNECOLOGY | Facility: HOSPITAL | Age: 25
End: 2020-12-23

## 2020-12-23 LAB
BASOPHILS # BLD AUTO: 0.01 K/UL (ref 0–0.2)
BASOPHILS NFR BLD: 0.1 % (ref 0–1.9)
DIFFERENTIAL METHOD: ABNORMAL
EOSINOPHIL # BLD AUTO: 0.1 K/UL (ref 0–0.5)
EOSINOPHIL NFR BLD: 0.6 % (ref 0–8)
ERYTHROCYTE [DISTWIDTH] IN BLOOD BY AUTOMATED COUNT: 13.3 % (ref 11.5–14.5)
HCT VFR BLD AUTO: 29.2 % (ref 37–48.5)
HGB BLD-MCNC: 9.4 G/DL (ref 12–16)
IMM GRANULOCYTES # BLD AUTO: 0.03 K/UL (ref 0–0.04)
IMM GRANULOCYTES NFR BLD AUTO: 0.3 % (ref 0–0.5)
LYMPHOCYTES # BLD AUTO: 2.1 K/UL (ref 1–4.8)
LYMPHOCYTES NFR BLD: 23.8 % (ref 18–48)
MCH RBC QN AUTO: 28.5 PG (ref 27–31)
MCHC RBC AUTO-ENTMCNC: 32.2 G/DL (ref 32–36)
MCV RBC AUTO: 89 FL (ref 82–98)
MONOCYTES # BLD AUTO: 0.5 K/UL (ref 0.3–1)
MONOCYTES NFR BLD: 6.2 % (ref 4–15)
NEUTROPHILS # BLD AUTO: 6 K/UL (ref 1.8–7.7)
NEUTROPHILS NFR BLD: 69 % (ref 38–73)
NRBC BLD-RTO: 0 /100 WBC
PLATELET # BLD AUTO: 223 K/UL (ref 150–350)
PMV BLD AUTO: 11 FL (ref 9.2–12.9)
RBC # BLD AUTO: 3.3 M/UL (ref 4–5.4)
WBC # BLD AUTO: 8.66 K/UL (ref 3.9–12.7)

## 2020-12-23 PROCEDURE — 25000003 PHARM REV CODE 250: Performed by: SPECIALIST

## 2020-12-23 PROCEDURE — 85025 COMPLETE CBC W/AUTO DIFF WBC: CPT

## 2020-12-23 PROCEDURE — 12000002 HC ACUTE/MED SURGE SEMI-PRIVATE ROOM

## 2020-12-23 PROCEDURE — 63600175 PHARM REV CODE 636 W HCPCS: Performed by: SPECIALIST

## 2020-12-23 PROCEDURE — 36415 COLL VENOUS BLD VENIPUNCTURE: CPT

## 2020-12-23 RX ORDER — SODIUM CHLORIDE, SODIUM LACTATE, POTASSIUM CHLORIDE, CALCIUM CHLORIDE 600; 310; 30; 20 MG/100ML; MG/100ML; MG/100ML; MG/100ML
INJECTION, SOLUTION INTRAVENOUS CONTINUOUS
Status: ACTIVE | OUTPATIENT
Start: 2020-12-23 | End: 2020-12-23

## 2020-12-23 RX ORDER — ADHESIVE BANDAGE
15 BANDAGE TOPICAL
Status: DISCONTINUED | OUTPATIENT
Start: 2020-12-23 | End: 2020-12-24 | Stop reason: HOSPADM

## 2020-12-23 RX ORDER — ONDANSETRON 4 MG/1
8 TABLET, ORALLY DISINTEGRATING ORAL EVERY 8 HOURS PRN
Status: DISCONTINUED | OUTPATIENT
Start: 2020-12-23 | End: 2020-12-24 | Stop reason: HOSPADM

## 2020-12-23 RX ORDER — DIPHENHYDRAMINE HYDROCHLORIDE 50 MG/ML
25 INJECTION INTRAMUSCULAR; INTRAVENOUS EVERY 4 HOURS PRN
Status: DISCONTINUED | OUTPATIENT
Start: 2020-12-23 | End: 2020-12-24 | Stop reason: HOSPADM

## 2020-12-23 RX ORDER — DIPHENHYDRAMINE HCL 25 MG
25 CAPSULE ORAL EVERY 4 HOURS PRN
Status: DISCONTINUED | OUTPATIENT
Start: 2020-12-23 | End: 2020-12-24 | Stop reason: HOSPADM

## 2020-12-23 RX ORDER — DOCUSATE SODIUM 100 MG/1
200 CAPSULE, LIQUID FILLED ORAL 2 TIMES DAILY
Status: DISCONTINUED | OUTPATIENT
Start: 2020-12-23 | End: 2020-12-24 | Stop reason: HOSPADM

## 2020-12-23 RX ORDER — SIMETHICONE 80 MG
1 TABLET,CHEWABLE ORAL EVERY 6 HOURS PRN
Status: DISCONTINUED | OUTPATIENT
Start: 2020-12-23 | End: 2020-12-24 | Stop reason: HOSPADM

## 2020-12-23 RX ADMIN — CEFOXITIN SODIUM 2 G: 2 INJECTION, SOLUTION INTRAVENOUS at 06:12

## 2020-12-23 RX ADMIN — CEFOXITIN SODIUM 2 G: 2 INJECTION, SOLUTION INTRAVENOUS at 05:12

## 2020-12-23 RX ADMIN — IBUPROFEN 600 MG: 200 TABLET, FILM COATED ORAL at 05:12

## 2020-12-23 RX ADMIN — IBUPROFEN 600 MG: 200 TABLET, FILM COATED ORAL at 07:12

## 2020-12-23 RX ADMIN — OXYCODONE HYDROCHLORIDE 10 MG: 5 TABLET ORAL at 02:12

## 2020-12-23 RX ADMIN — OXYCODONE HYDROCHLORIDE 10 MG: 5 TABLET ORAL at 01:12

## 2020-12-23 RX ADMIN — OXYCODONE HYDROCHLORIDE 10 MG: 5 TABLET ORAL at 08:12

## 2020-12-23 RX ADMIN — CEFOXITIN SODIUM 2 G: 2 INJECTION, SOLUTION INTRAVENOUS at 12:12

## 2020-12-23 RX ADMIN — PROMETHAZINE HYDROCHLORIDE 6.25 MG: 25 INJECTION INTRAMUSCULAR; INTRAVENOUS at 12:12

## 2020-12-23 RX ADMIN — IBUPROFEN 600 MG: 200 TABLET, FILM COATED ORAL at 12:12

## 2020-12-23 RX ADMIN — DOCUSATE SODIUM 200 MG: 100 CAPSULE, LIQUID FILLED ORAL at 08:12

## 2020-12-23 RX ADMIN — SIMETHICONE 80 MG: 80 TABLET, CHEWABLE ORAL at 05:12

## 2020-12-23 RX ADMIN — OXYCODONE HYDROCHLORIDE 10 MG: 5 TABLET ORAL at 07:12

## 2020-12-23 RX ADMIN — Medication: at 05:12

## 2020-12-23 NOTE — LACTATION NOTE
This note was copied from a baby's chart.     12/23/20 1500   Maternal Assessment   Breast Density Bilateral:;soft   Areola Bilateral:;elastic   Nipples Bilateral:;everted   Maternal Infant Feeding   Maternal Emotional State independent   Infant Positioning cradle   Signs of Milk Transfer audible swallow;infant jaw motion present   Pain with Feeding no   Latch Assistance yes     Mother breastfeeding baby on left breast in cradle position. Baby latched deeply, nursing well with audible swallows. Mother denies pain during feeding. Reviewed basic breastfeeding instructions and encouraged mother to call me for any further breastfeeding assistance. Mother verbalizes understanding of all instructions with good recall.

## 2020-12-23 NOTE — ANESTHESIA POST-OP PAIN MANAGEMENT
Acute Pain Service Progress Note    Gustavo Perry is a 25 y.o., female, [id number].    Surgery:   section    Post Op Day #: 1      The patient is postoperative day 1.  She was found to be awake alert and oriented x4 without evidence over sedation, confusion, or respiratory depression at this time.  She states that her pain is adequately controlled at this time.  The patient notes that she did experience nausea/vomiting and pruritus overnight with the nausea vomiting treated relieved.  The pruritus is resolving at this time.  She states that her legs feel normal and was able to demonstrate good motor and sensory to her lower extremities at this time.  The patient notes no headache or back ache at this time.  The epidural site was examined and found to be clean and dry without evidence of bleeding, infection or hematoma formation.  The patient was instructed to notify the Department of Anesthesiology with any questions, problems or concerns.  The plan is to continue the pain management as is.  The patient demonstrates no anesthesia complications at this time.      Assessment:     Pain control adequate    Plan:     Patient doing well, continue present treatment.    Evaluator Guero Ayon

## 2020-12-23 NOTE — OP NOTE
Section Operative Note with bilateral fimbriectomy    Indications:  Emergency  secondary to fetal bradycardia secondary to prolapse cord after artificial rupture membranes desires elective sterilization    Pre-operative Diagnosis: 39w0d    Post-operative Diagnosis: same    Surgeon: LUCINA JOSÉ     Assistants: tech    Anesthesia:  Epidural    Procedure Details:     Patient underwent artificial rupture membranes with large amount of fluid coming out at 4 cm dilation as I stepped away to change gloves to place an IUPC on return to examine felt a palpable cord below the vertex against the cervix I then elevated the head and tried to reduce the cord however was not able to be reduced so we called for an emergency  as fetal bradycardia noted and I rode the gurney with the patient to the operating room while lifting up on the fetal vertex off the cord.  Once in the operating room I switched positions with a nurse who elevated the head as we prepped for emergency , using a splash prep and an emergency scalpel we took the skin and subcutaneous tissues down to the fascia which was nicked and extended laterally fascia was  off the rectus muscles and the muscles were separate in the midline and the peritoneum was entered bluntly.  The uterus was then incised with a low-transverse hysterotomy above the level of the bladder and extended laterally and delivered the baby then handed off to the the nurse practitioner after the cord was clamped and cut.    The placenta was delivered.  The uterus was exteriorized.  The uterus was then closed with a running locking 0 Maxon with excellent hemostasis.  Next, the cul-de-sac and the pericolic gutters were copiously irrigated and suctioned.  The uterus was replaced back into the pelvic cavity and again hemostasis was checked which was excellent.  Next high-pitched talked to the patient about doing a tubal ligation as it was her desire and she  had signed a consent form so we proceeded with this procedure as The distal ends of the both tubes were identified and were grasped with Ashely clamps allowing excision of the fimbriated ends with [place] scissors. The pedicles were suture ligated with a fore and aft stitch of 0 plain gut and ends briefly cauterized ensuring excellent hemostasis. Next, the muscles were gently reapproximated with a 3-0 Monocryl in a figure-of-eight fashion.  The fascia was closed with two sutures of 0 Maxon in a running fashion, meeting in the middle.  The subcutaneous tissues were copiously irrigated and any bleeding points cauterized.  The skin was then closed with with clips and a pressure dressing placed..     X-ray was used to confirm clear abdominal cavity of surgical instruments and supplies as were not able to do the usual counting prior to the case of the case. She was sent to recovery room in stable condition to continue on IV antibiotics times 24 hours.    Findings:  The uterus, tubes and ovaries appeared normal.  Apgar 8 9      Estimated Blood Loss:  400 cc           Total IV Fluids: per anesthesia           Specimens:  Placenta bilateral fimbriectomy           Complications:  None; patient tolerated the procedure well.           Disposition: recovery room           Condition: stable    Attending Attestation: I performed the procedure.

## 2020-12-23 NOTE — TRANSFER OF CARE
"Anesthesia Transfer of Care Note    Patient: Gustavo Perry    Procedure(s) Performed: * No procedures listed *    Patient location: Labor and Delivery    Anesthesia Type: epidural    Post pain: adequate analgesia    Post assessment: no apparent anesthetic complications    Post vital signs: stable    Level of consciousness: awake    Nausea/Vomiting: no nausea/vomiting    Complications: none    Transfer of care protocol was followed      Last vitals:   Visit Vitals  /65   Pulse 76   Temp 35.9 °C (96.6 °F) (Temporal)   Resp 16   Ht 5' 7" (1.702 m)   Wt 110.2 kg (243 lb)   LMP 03/17/2020   SpO2 98%   Breastfeeding No   BMI 38.06 kg/m²     "

## 2020-12-23 NOTE — NURSING
Spoke with Dr. Bose, notified pt had not yet voided post in and out cath.  Order to place hall received.  Pt updated and asked for one more last chance to try.  Pt used peribottle and voided. Dr. Bose notified.

## 2020-12-23 NOTE — ANESTHESIA POSTPROCEDURE EVALUATION
Anesthesia Post Evaluation    Patient: Gustavo Perry    Procedure(s) Performed: Procedure(s) (LRB):   SECTION (N/A)    Final Anesthesia Type: epidural      Patient location during evaluation: floor  Patient participation: Yes- Able to Participate  Level of consciousness: awake and alert  Post-procedure vital signs: reviewed and stable  Pain management: adequate  Airway patency: patent    PONV status at discharge: No PONV  Anesthetic complications: no      Cardiovascular status: stable  Respiratory status: unassisted  Hydration status: euvolemic  Follow-up not needed.          Vitals Value Taken Time   /60 20   Temp  20   Pulse 62 20   Resp  20   SpO2 99 % 20   Vitals shown include unvalidated device data.      No case tracking events are documented in the log.      Pain/Marcos Score: Pain Rating Prior to Med Admin: 5 (2020  8:00 PM)  Pain Rating Post Med Admin: 4 (2020  8:30 PM)

## 2020-12-23 NOTE — PROGRESS NOTES
"      SUBJECTIVE:     Postpartum Day 1  Delivery    Gustavo Perry states she feels well and has no complaints. She denies emotional concerns. Her pain is well controlled with current medications. The patient is ambulating. She is tolerating a regular diet. Flatus has been passed. Urinary output is adequate.    OBJECTIVE:     Vital Signs (Most Recent):  /70   Pulse 64   Temp 98.2 °F (36.8 °C) (Oral)   Resp 18   Ht 5' 7" (1.702 m)   Wt 110.2 kg (243 lb)   LMP 2020   SpO2 97%   Breastfeeding Unknown   BMI 38.06 kg/m²       Vital Signs Range (Last 24H):  Reviewed    I & O (Last 24H):  Intake/Output Summary (Last 24 hours)    Intake/Output Summary (Last 24 hours) at 2020 1312  Last data filed at 2020 0600  Gross per 24 hour   Intake 160 ml   Output 3475 ml   Net -3315 ml         Physical Exam:  Gen appears in NAD  Abd soft,appropriate tenderness normal bowel sounds  Incision well approximated clean dry intact  Ext  neg homans, slight edema    Hemoglobin/Hematocrit  CBC:   Lab Results   Component Value Date/Time    WBC 8.66 2020 09:25 AM    RBC 3.30 (L) 2020 09:25 AM    HGB 9.4 (L) 2020 09:25 AM    HCT 29.2 (L) 2020 09:25 AM     2020 09:25 AM    MCV 89 2020 09:25 AM    MCH 28.5 2020 09:25 AM    MCHC 32.2 2020 09:25 AM       ABO/Rh  A NEG    Rubella      ASSESSMENT/PLAN:     Status post  section.   Patient Active Problem List   Diagnosis    Rh negative status during pregnancy    History of trichomoniasis    History of PCR DNA positive for HSV2    Encounter for elective induction of labor        Doing well postoperatively.     Routine advances, Continue current care.  "

## 2020-12-23 NOTE — PLAN OF CARE
Met with patient and patient stated that they have the necessary items for the baby including diapers and car seat. Mother is breast feeding and was inquiring about a breast pump. Informed mother that since she has [place] medicaid that she will have to go to her [facility].  Patient knows about WIC and how to apply.       12/23/20 4558   Discharge Assessment   Assessment Type Discharge Planning Assessment   Confirmed/corrected address and phone number on facesheet? Yes   Assessment information obtained from? Patient   Discharge Plan A Home   Discharge Plan B Home   DME Needed Upon Discharge  none   Patient/Family in Agreement with Plan yes

## 2020-12-23 NOTE — PLAN OF CARE
20 0854   OB SCREEN   Source of Information health record   Received Prenatal Care Yes   Any indications/suspicions for None   Is this a teen pregnancy No   Is the baby in NICU No   Indication for adoption/Safe Haven No   Indication for DME/post-acute needs No   HIV (+) No   Any congenital  disorders No   Fetal demise/ death No

## 2020-12-23 NOTE — NURSING
Pt unable to void, in & out cath done per MD order with 800mL removed from bladder.  Pt tolerated well.

## 2020-12-24 VITALS
WEIGHT: 243 LBS | DIASTOLIC BLOOD PRESSURE: 78 MMHG | RESPIRATION RATE: 18 BRPM | HEART RATE: 88 BPM | TEMPERATURE: 99 F | HEIGHT: 67 IN | OXYGEN SATURATION: 98 % | BODY MASS INDEX: 38.14 KG/M2 | SYSTOLIC BLOOD PRESSURE: 112 MMHG

## 2020-12-24 PROCEDURE — 25000003 PHARM REV CODE 250: Performed by: SPECIALIST

## 2020-12-24 PROCEDURE — 90471 IMMUNIZATION ADMIN: CPT | Performed by: SPECIALIST

## 2020-12-24 PROCEDURE — 63600175 PHARM REV CODE 636 W HCPCS: Performed by: SPECIALIST

## 2020-12-24 PROCEDURE — 90472 IMMUNIZATION ADMIN EACH ADD: CPT | Performed by: SPECIALIST

## 2020-12-24 PROCEDURE — 90715 TDAP VACCINE 7 YRS/> IM: CPT | Performed by: SPECIALIST

## 2020-12-24 PROCEDURE — 90686 IIV4 VACC NO PRSV 0.5 ML IM: CPT | Performed by: SPECIALIST

## 2020-12-24 RX ORDER — IBUPROFEN 600 MG/1
600 TABLET ORAL EVERY 6 HOURS PRN
Refills: 0
Start: 2020-12-24 | End: 2022-04-01

## 2020-12-24 RX ADMIN — OXYCODONE HYDROCHLORIDE 10 MG: 5 TABLET ORAL at 01:12

## 2020-12-24 RX ADMIN — IBUPROFEN 600 MG: 200 TABLET, FILM COATED ORAL at 06:12

## 2020-12-24 RX ADMIN — DOCUSATE SODIUM 200 MG: 100 CAPSULE, LIQUID FILLED ORAL at 08:12

## 2020-12-24 RX ADMIN — INFLUENZA A VIRUS A/GUANGDONG-MAONAN/SWL1536/2019 CNIC-1909 (H1N1) ANTIGEN (FORMALDEHYDE INACTIVATED), INFLUENZA A VIRUS A/HONG KONG/2671/2019 (H3N2) ANTIGEN (FORMALDEHYDE INACTIVATED), INFLUENZA B VIRUS B/PHUKET/3073/2013 ANTIGEN (FORMALDEHYDE INACTIVATED), AND INFLUENZA B VIRUS B/WASHINGTON/02/2019 ANTIGEN (FORMALDEHYDE INACTIVATED) 0.5 ML: 15; 15; 15; 15 INJECTION, SUSPENSION INTRAMUSCULAR at 08:12

## 2020-12-24 RX ADMIN — CLOSTRIDIUM TETANI TOXOID ANTIGEN (FORMALDEHYDE INACTIVATED), CORYNEBACTERIUM DIPHTHERIAE TOXOID ANTIGEN (FORMALDEHYDE INACTIVATED), BORDETELLA PERTUSSIS TOXOID ANTIGEN (GLUTARALDEHYDE INACTIVATED), BORDETELLA PERTUSSIS FILAMENTOUS HEMAGGLUTININ ANTIGEN (FORMALDEHYDE INACTIVATED), BORDETELLA PERTUSSIS PERTACTIN ANTIGEN, AND BORDETELLA PERTUSSIS FIMBRIAE 2/3 ANTIGEN 0.5 ML: 5; 2; 2.5; 5; 3; 5 INJECTION, SUSPENSION INTRAMUSCULAR at 08:12

## 2020-12-24 RX ADMIN — OXYCODONE HYDROCHLORIDE 10 MG: 5 TABLET ORAL at 08:12

## 2020-12-24 NOTE — DISCHARGE SUMMARY
[facility]  Obstetrics  Discharge Summary      Patient Name: Gustavo Perry  MRN: [id number]  Admission Date: 2020  Hospital Length of Stay: 2 days  Discharge Date and Time:  2020 11:12 AM  Attending Physician: Raghav José MD   Discharging Provider: Raghav José MD   Primary Care Provider: Primary Doctor No    HPI: No notes on file  The patient was in labor routine underwent artificial rupture membranes and had emergency  disease secondary to prolapsed cord.      Procedure(s) (LRB):   SECTION (N/A)     Hospital Course:   No notes on file     Consults (From admission, onward)        Status Ordering Provider     Inpatient consult to Anesthesiology  Once     Provider:  (Not yet assigned)    Acknowledged RAGHAV JOSÉ     Inpatient consult to Anesthesiology  Once     Provider:  (Not yet assigned)    Acknowledged RAGHAV JOSÉ          Final Active Diagnoses:    Diagnosis Date Noted POA    PRINCIPAL PROBLEM:  Encounter for elective induction of labor [Z34.90] 2020 Not Applicable      Problems Resolved During this Admission:        Significant Diagnostic Studies: Labs:   CBC   Recent Labs   Lab 20  0925   WBC 8.66   HGB 9.4*   HCT 29.2*        Lab Results   Component Value Date    GROUPTRH A NEG 2020         Feeding Method: breast    Immunizations     Date Immunization Status Dose Route/Site Given by    20 0503 Influenza - Quadrivalent - PF *Preferred* (6 months and older) Deleted 0.5 mL Intramuscular/     20 0503 Rho (D) Immune Globulin - IM Incomplete 300 mcg Intramuscular/     20 08 Influenza - Quadrivalent - PF *Preferred* (6 months and older) Deleted 0.5 mL Intramuscular/     20 0822 Tdap Deleted 0.5 mL Intramuscular/     2018 Tdap Given 0.5 mL Intramuscular/Right deltoid Lisy Ace RN    20 0818 Influenza - Quadrivalent - PF *Preferred* (6 months and older) Given 0.5 mL Intramuscular/Left  deltoid Lisy Ace RN          Delivery:    Episiotomy:     Lacerations:     Repair suture:     Repair # of packets:     Blood loss (ml):       Birth information:  YOB: 2021   Time of birth: 6:05 PM   Sex: male   Delivery type: , Low Transverse   Gestational Age: 39w0d    Delivery Clinician:      Other providers:       Additional  information:  Forceps:    Vacuum:    Breech:    Observed anomalies      Living?:           APGARS  One minute Five minutes Ten minutes   Skin color:         Heart rate:         Grimace:         Muscle tone:         Breathing:         Totals: 8  9        Placenta: Delivered:       appearance    Pending Diagnostic Studies:     Procedure Component Value Units Date/Time    Specimen to Pathology - Surgery [[id number]] Collected: 20    Order Status: Sent Lab Status: No result     Specimen: Tissue           Discharged Condition: good    Disposition: Home or Self Care    Follow Up:  Follow-up Information     Follow up In 2 days.    Why: For wound re-check               Patient Instructions:      Diet Adult Regular     Remove dressing in 24 hours     Pelvic Rest     Notify your health care provider if you experience any of the following:  temperature >100.4     Notify your health care provider if you experience any of the following:  persistent nausea and vomiting or diarrhea     Notify your health care provider if you experience any of the following:  severe uncontrolled pain     Notify your health care provider if you experience any of the following:  redness, tenderness, or signs of infection (pain, swelling, redness, odor or green/yellow discharge around incision site)     Activity as tolerated     Medications:  Current Discharge Medication List      START taking these medications    Details   ibuprofen (ADVIL,MOTRIN) 600 MG tablet Take 1 tablet (600 mg total) by mouth every 6 (six) hours as needed.  Qty:  , Refills: 0      lanolin Crea cream Apply topically  continuous prn for Dry Skin (Apply to nipples for soreness).  Qty:  , Refills: 0         STOP taking these medications       prenatal vit,calc76-iron-folic (PRENATABS RX) 29 mg iron- 1 mg Tab Comments:   Reason for Stopping:               Raghav Bose MD  Obstetrics  [facility]

## 2020-12-24 NOTE — DISCHARGE INSTRUCTIONS
Pelvic rest for 6 weeks (no sex, tampons, douching, nothing in the vagina)    You can experience vaginal bleeding on and off for up to 6 weeks, it will gradually get lighter and the color will change from bright red to a brownish discharge towards the end.    Activity:  NO strenuous activities or exercising for 6 weeks.  Do not /lift anything over 15 pounds and no heavy housework or cleaning for 6 weeks.  Limit stair climbing to twice a day during the first 2 weeks.   NO driving for 4 weeks.  You may take short car trips but do not drive.    You may shower ONLY for the first 2 weeks, after 2 weeks you can soak in a bathtub.  Use a mild soap, no heavy perfumes or fragrances to avoid irritation.     Walking frequently following a  delivery promotes healing and decreases pain associated with gas.   If constipation develops:  You may take Colace (stool softener), Milk of Magnesia, Dulcolax or Miralax.  All of these medications are sold over the counter.      Incision Care:   Clean your incision with mild soap & warm water only- do not scrub- let warm water run over it, then pat dry.      Pain Relief:  You may take Motrin for mild pain & uterine cramping.      Emotional Changes:  You may experience baby blues after delivery.  You may feel let down, anxious and cry easily.  This is normal.  These feelings can begin 2-3 days after delivery and usually disappear in about a week or two.  Prolonged sadness may indicate postpartum depression.      Call your doctor for any of the following:  Difficulty breathing, problems with any of your medications, inability to eat.    Foul smelling vaginal discharge.  If you notice pus-like drainage from your incision, if your incision or the area around it becomes hot or swollen, or if you notice a foul smelling odor.  Temperature above 100.4.    Heavy vaginal bleeding.  All women bleed different after delivery and each delivery is different.  Heavy bleeding consists of  saturating a john pad in a 1 hour time period.  Passing clots are normal, if you pass a blood clot larger than the size of a golf ball call your doctor's office.   If you experience pain in your legs/calves, if one leg increases in size and becomes swollen or becomes hot to touch or discolored.   Crying or periods of sadness beyond 2 weeks.      If you are breast feeding:  Wash your breasts with mild soap and warm water.  You should wear a supportive bra.  You should continue to take a prenatal vitamin for 6 weeks or until breastfeeding is discontinued.  If nipples are sore, apply a few drops of breast milk after nursing and let air dry or you can use Lanolin cream.   If breasts are engorged, apply warmth and express milk.           Breastfeeding Discharge Instructions       [facility] Breastfeeding Support Services [phone]     American Academy of Pediatrics recommends exclusive breastfeeding for the first 6 months of life and continued breastfeeding with the introduction of supplemental foods beyond the first year of life.   The World Health Organization and the American Academy of Pediatrics recommend to delay all bottle and pacifier use until after 4 weeks of age and breastfeeding is well established.  American Academy of Pediatrics does recommend the use of a pacifier at naptime and bedtime, as a SIDS Reduction strategy, for  newborns only after 1 month of age and breastfeeding has been firmly established.    Feed the baby at the earliest sign of hunger or comfort  o Hands to mouth, sucking motions  o Rooting or searching for something to suck on  o Dont wait for crying - it is a not a late sign of hunger; it is a sign of distress     The feedings may be 8-12 times per 24hrs and will not follow a schedule   Alternate the breast you start the feeding with, or start with the breast that feels the fullest   Switch breasts when the baby takes himself off the breast or falls  asleep   Keep offering breasts until the baby looks full, no longer gives hunger signs, and stays asleep when placed on his back in the crib   If the baby is sleepy and wont wake for a feeding, put the baby skin-to-skin dressed in a diaper against the mothers bare chest   Sleep near your baby   The baby should be positioned and latched on to the breast correctly  o Chest-to-chest, chin in the breast  o Babys lips are flipped outward  o Babys mouth is stretched open wide like a shout  o Babys sucking should feel like tugging to the mother  - The baby should be drinking at the breast:  o You should hear swallowing or gulping throughout the feeding  o You should see milk on the babys lips when he comes off the breast  o Your breasts should be softer when the baby is finished feeding  o The baby should look relaxed at the end of feedings  o After the 4th day and your milk is in:  o The babys poop should turn bright yellow and be loose, watery, and seedy  o The baby should have at least 3-4 poops the size of the palm of your hand per day  o The baby should have at least 6-8 wet diapers per day  o The urine should be light yellow in color  You should drink when you are thirsty and eat a healthy diet when you are    hungry.     Take naps to get the rest you need.   Take medications and/or drink alcohol only with permission of your obstetrician    or the babys pediatrician.  You can also call the [facility],   ([phone]), Monday-Friday, 8am-5pm Central time, to get the most   up-to-date evidence-based information on the use of medications during   pregnancy and breastfeeding.      The baby should be examined by a pediatrician at 3-5 days of age; unless ordered sooner by the pediatrician.  Once your milk comes in, the baby should be back to birth weight no later than 10-14 days of age.    If your having problems with breastfeeding or have any questions regarding breastfeeding- call [facility]  Breastfeeding Support services [phone] Monday- Friday 9 am-5 pm

## 2020-12-24 NOTE — PROGRESS NOTES
"                                                                                                  SUBJECTIVE:     Postpartum Day 2  Delivery    Gustavo Perry states she feels well and has no complaints. She denies emotional concerns. Her pain is well controlled with current medications. The patient is ambulating. She is tolerating a regular diet. Flatus has been passed. Urinary output is adequate and voiding well.  .    OBJECTIVE:     Vital Signs (Most Recent):  /74   Pulse 83   Temp 98.5 °F (36.9 °C) (Oral)   Resp 18   Ht 5' 7" (1.702 m)   Wt 110.2 kg (243 lb)   LMP 2020   SpO2 98%   Breastfeeding Yes   BMI 38.06 kg/m²       Vital Signs Range (Last 24H):  Reviewed    I & O (Last 24H):  Intake/Output Summary (Last 24 hours)    Intake/Output Summary (Last 24 hours) at 2020 1108  Last data filed at 2020 1315  Gross per 24 hour   Intake --   Output 800 ml   Net -800 ml         Physical Exam:  Gen appears in NAD  Abd soft,appropriate tenderness normal bowel sounds  Incision well approximated clean dry intact  Ext  neg homans, slight edema    Hemoglobin/Hematocrit  CBC:   Lab Results   Component Value Date/Time    WBC 8.66 2020 09:25 AM    RBC 3.30 (L) 2020 09:25 AM    HGB 9.4 (L) 2020 09:25 AM    HCT 29.2 (L) 2020 09:25 AM     2020 09:25 AM    MCV 89 2020 09:25 AM    MCH 28.5 2020 09:25 AM    MCHC 32.2 2020 09:25 AM       ABO/Rh  A NEG    Rubella      ASSESSMENT/PLAN:     S/p csect     Patient Active Problem List   Diagnosis    Rh negative status during pregnancy    History of trichomoniasis    History of PCR DNA positive for HSV2    Encounter for elective induction of labor          D/c home  See orders/discharge instructions  F/u as per MD  Emergency room precautions staple removal at office on Saturday  "

## 2021-07-01 ENCOUNTER — PATIENT MESSAGE (OUTPATIENT)
Dept: ADMINISTRATIVE | Facility: OTHER | Age: 26
End: 2021-07-01

## 2021-07-19 ENCOUNTER — HOSPITAL ENCOUNTER (EMERGENCY)
Facility: HOSPITAL | Age: 26
Discharge: HOME | End: 2021-07-19
Attending: EMERGENCY MEDICINE
Payer: MEDICAID

## 2021-07-19 VITALS
HEIGHT: 67 IN | OXYGEN SATURATION: 100 % | SYSTOLIC BLOOD PRESSURE: 125 MMHG | DIASTOLIC BLOOD PRESSURE: 76 MMHG | TEMPERATURE: 98 F | WEIGHT: 228 LBS | BODY MASS INDEX: 35.79 KG/M2 | HEART RATE: 79 BPM | RESPIRATION RATE: 18 BRPM

## 2021-07-19 DIAGNOSIS — J06.9 UPPER RESPIRATORY TRACT INFECTION, UNSPECIFIED TYPE: Primary | ICD-10-CM

## 2021-07-19 DIAGNOSIS — R05.9 COUGH: ICD-10-CM

## 2021-07-19 LAB
B-HCG UR QL: NEGATIVE
SARS-COV-2 RDRP RESP QL NAA+PROBE: NEGATIVE

## 2021-07-19 PROCEDURE — 71045 X-RAY EXAM CHEST 1 VIEW: CPT | Mod: 26,,, | Performed by: RADIOLOGY

## 2021-07-19 PROCEDURE — 99284 EMERGENCY DEPT VISIT MOD MDM: CPT

## 2021-07-19 PROCEDURE — 71045 XR CHEST AP PORTABLE: ICD-10-PCS | Mod: 26,,, | Performed by: RADIOLOGY

## 2021-07-19 PROCEDURE — 81025 URINE PREGNANCY TEST: CPT | Performed by: NURSE PRACTITIONER

## 2021-07-19 PROCEDURE — 71045 X-RAY EXAM CHEST 1 VIEW: CPT | Mod: TC,FY

## 2021-07-19 PROCEDURE — U0002 COVID-19 LAB TEST NON-CDC: HCPCS | Performed by: NURSE PRACTITIONER

## 2021-07-19 RX ORDER — METHYLPREDNISOLONE 4 MG/1
TABLET ORAL
Qty: 1 PACKAGE | Refills: 0 | Status: SHIPPED | OUTPATIENT
Start: 2021-07-19 | End: 2021-08-09

## 2021-07-19 RX ORDER — AZITHROMYCIN 250 MG/1
250 TABLET, FILM COATED ORAL DAILY
Qty: 6 TABLET | Refills: 0 | Status: SHIPPED | OUTPATIENT
Start: 2021-07-19 | End: 2022-04-01

## 2021-10-19 ENCOUNTER — HOSPITAL ENCOUNTER (EMERGENCY)
Facility: HOSPITAL | Age: 26
Discharge: HOME | End: 2021-10-19
Attending: FAMILY MEDICINE
Payer: MEDICAID

## 2021-10-19 VITALS
RESPIRATION RATE: 16 BRPM | BODY MASS INDEX: 34.84 KG/M2 | TEMPERATURE: 98 F | WEIGHT: 222 LBS | HEART RATE: 108 BPM | DIASTOLIC BLOOD PRESSURE: 77 MMHG | OXYGEN SATURATION: 99 % | HEIGHT: 67 IN | SYSTOLIC BLOOD PRESSURE: 124 MMHG

## 2021-10-19 DIAGNOSIS — R05.9 COUGH: Primary | ICD-10-CM

## 2021-10-19 LAB
B-HCG UR QL: NEGATIVE
SARS-COV-2 RDRP RESP QL NAA+PROBE: NEGATIVE

## 2021-10-19 PROCEDURE — 71046 XR CHEST PA AND LATERAL: ICD-10-PCS | Mod: 26,,, | Performed by: RADIOLOGY

## 2021-10-19 PROCEDURE — 99283 EMERGENCY DEPT VISIT LOW MDM: CPT | Mod: 25

## 2021-10-19 PROCEDURE — 81025 URINE PREGNANCY TEST: CPT | Performed by: PHYSICIAN ASSISTANT

## 2021-10-19 PROCEDURE — U0002 COVID-19 LAB TEST NON-CDC: HCPCS | Performed by: PHYSICIAN ASSISTANT

## 2021-10-19 PROCEDURE — 71046 X-RAY EXAM CHEST 2 VIEWS: CPT | Mod: TC,FY

## 2021-10-19 PROCEDURE — 71046 X-RAY EXAM CHEST 2 VIEWS: CPT | Mod: 26,,, | Performed by: RADIOLOGY

## 2021-10-19 RX ORDER — BENZONATATE 100 MG/1
100 CAPSULE ORAL 3 TIMES DAILY PRN
Qty: 20 CAPSULE | Refills: 0 | Status: SHIPPED | OUTPATIENT
Start: 2021-10-19 | End: 2021-10-29

## 2021-11-30 ENCOUNTER — HOSPITAL ENCOUNTER (EMERGENCY)
Facility: HOSPITAL | Age: 26
Discharge: HOME | End: 2021-11-30
Attending: EMERGENCY MEDICINE
Payer: MEDICAID

## 2021-11-30 VITALS
HEIGHT: 67 IN | DIASTOLIC BLOOD PRESSURE: 82 MMHG | BODY MASS INDEX: 33.74 KG/M2 | TEMPERATURE: 101 F | RESPIRATION RATE: 19 BRPM | WEIGHT: 215 LBS | SYSTOLIC BLOOD PRESSURE: 121 MMHG | HEART RATE: 105 BPM | OXYGEN SATURATION: 97 %

## 2021-11-30 DIAGNOSIS — J06.9 VIRAL UPPER RESPIRATORY INFECTION: Primary | ICD-10-CM

## 2021-11-30 LAB
BILIRUB UR QL STRIP: NEGATIVE
CLARITY UR: CLEAR
COLOR UR: YELLOW
GLUCOSE UR QL STRIP: NEGATIVE
HGB UR QL STRIP: ABNORMAL
INFLUENZA A, MOLECULAR: NEGATIVE
INFLUENZA B, MOLECULAR: NEGATIVE
KETONES UR QL STRIP: NEGATIVE
LEUKOCYTE ESTERASE UR QL STRIP: NEGATIVE
NITRITE UR QL STRIP: NEGATIVE
PH UR STRIP: 6 [PH] (ref 5–8)
PROT UR QL STRIP: NEGATIVE
SARS-COV-2 RDRP RESP QL NAA+PROBE: NEGATIVE
SP GR UR STRIP: >=1.03 (ref 1–1.03)
SPECIMEN SOURCE: NORMAL
URN SPEC COLLECT METH UR: ABNORMAL
UROBILINOGEN UR STRIP-ACNC: NEGATIVE EU/DL

## 2021-11-30 PROCEDURE — 81003 URINALYSIS AUTO W/O SCOPE: CPT | Performed by: EMERGENCY MEDICINE

## 2021-11-30 PROCEDURE — 87502 INFLUENZA DNA AMP PROBE: CPT | Performed by: EMERGENCY MEDICINE

## 2021-11-30 PROCEDURE — 99282 EMERGENCY DEPT VISIT SF MDM: CPT

## 2021-11-30 PROCEDURE — U0002 COVID-19 LAB TEST NON-CDC: HCPCS | Performed by: EMERGENCY MEDICINE

## 2021-12-01 ENCOUNTER — HOSPITAL ENCOUNTER (EMERGENCY)
Facility: HOSPITAL | Age: 26
Discharge: HOME | End: 2021-12-01
Attending: EMERGENCY MEDICINE
Payer: MEDICAID

## 2021-12-01 VITALS
DIASTOLIC BLOOD PRESSURE: 82 MMHG | SYSTOLIC BLOOD PRESSURE: 120 MMHG | HEART RATE: 92 BPM | TEMPERATURE: 99 F | OXYGEN SATURATION: 97 % | BODY MASS INDEX: 33.67 KG/M2 | RESPIRATION RATE: 18 BRPM | WEIGHT: 215 LBS

## 2021-12-01 DIAGNOSIS — J18.9 PNEUMONIA OF RIGHT MIDDLE LOBE DUE TO INFECTIOUS ORGANISM: Primary | ICD-10-CM

## 2021-12-01 DIAGNOSIS — R05.9 COUGH: ICD-10-CM

## 2021-12-01 LAB
B-HCG UR QL: NEGATIVE
CTP QC/QA: YES

## 2021-12-01 PROCEDURE — 81025 URINE PREGNANCY TEST: CPT | Performed by: PHYSICIAN ASSISTANT

## 2021-12-01 PROCEDURE — 99283 EMERGENCY DEPT VISIT LOW MDM: CPT | Mod: 25

## 2021-12-01 PROCEDURE — 25000003 PHARM REV CODE 250: Mod: GY | Performed by: EMERGENCY MEDICINE

## 2021-12-01 PROCEDURE — 25000003 PHARM REV CODE 250: Performed by: NURSE PRACTITIONER

## 2021-12-01 RX ORDER — LEVOFLOXACIN 750 MG/1
750 TABLET ORAL DAILY
Status: DISCONTINUED | OUTPATIENT
Start: 2021-12-01 | End: 2021-12-01 | Stop reason: HOSPADM

## 2021-12-01 RX ORDER — BENZONATATE 200 MG/1
200 CAPSULE ORAL 3 TIMES DAILY PRN
Qty: 20 CAPSULE | Refills: 0 | Status: SHIPPED | OUTPATIENT
Start: 2021-12-01 | End: 2021-12-11

## 2021-12-01 RX ORDER — ACETAMINOPHEN 500 MG
1000 TABLET ORAL
Status: COMPLETED | OUTPATIENT
Start: 2021-12-01 | End: 2021-12-01

## 2021-12-01 RX ORDER — LEVOFLOXACIN 750 MG/1
750 TABLET ORAL DAILY
Qty: 9 TABLET | Refills: 0 | Status: SHIPPED | OUTPATIENT
Start: 2021-12-02 | End: 2021-12-11

## 2021-12-01 RX ADMIN — LEVOFLOXACIN 750 MG: 750 TABLET, FILM COATED ORAL at 04:12

## 2021-12-01 RX ADMIN — ACETAMINOPHEN 1000 MG: 500 TABLET, FILM COATED ORAL at 03:12

## 2022-03-02 ENCOUNTER — TELEPHONE (OUTPATIENT)
Dept: FAMILY MEDICINE | Facility: CLINIC | Age: 27
End: 2022-03-02
Payer: COMMERCIAL

## 2022-03-02 NOTE — TELEPHONE ENCOUNTER
Spoke with patient. Appointment scheduled on 4/1/22 with Dr. Rivas to establish care. Patient voiced understanding.

## 2022-03-15 ENCOUNTER — HOSPITAL ENCOUNTER (EMERGENCY)
Facility: HOSPITAL | Age: 27
Discharge: HOME | End: 2022-03-15
Attending: EMERGENCY MEDICINE
Payer: MEDICAID

## 2022-03-15 VITALS
RESPIRATION RATE: 16 BRPM | WEIGHT: 215 LBS | SYSTOLIC BLOOD PRESSURE: 124 MMHG | DIASTOLIC BLOOD PRESSURE: 67 MMHG | HEART RATE: 96 BPM | TEMPERATURE: 100 F | BODY MASS INDEX: 33.74 KG/M2 | HEIGHT: 67 IN | OXYGEN SATURATION: 97 %

## 2022-03-15 DIAGNOSIS — R05.9 COUGH: ICD-10-CM

## 2022-03-15 DIAGNOSIS — J10.1 INFLUENZA A: Primary | ICD-10-CM

## 2022-03-15 LAB
ALBUMIN SERPL BCP-MCNC: 4.2 G/DL (ref 3.5–5.2)
ALP SERPL-CCNC: 75 U/L (ref 55–135)
ALT SERPL W/O P-5'-P-CCNC: 16 U/L (ref 10–44)
ANION GAP SERPL CALC-SCNC: 9 MMOL/L (ref 8–16)
AST SERPL-CCNC: 14 U/L (ref 10–40)
B-HCG UR QL: NEGATIVE
BASOPHILS # BLD AUTO: 0.05 K/UL (ref 0–0.2)
BASOPHILS NFR BLD: 0.7 % (ref 0–1.9)
BILIRUB SERPL-MCNC: 0.4 MG/DL (ref 0.1–1)
BNP SERPL-MCNC: 15 PG/ML (ref 0–99)
BUN SERPL-MCNC: 10 MG/DL (ref 6–20)
CALCIUM SERPL-MCNC: 9.1 MG/DL (ref 8.7–10.5)
CHLORIDE SERPL-SCNC: 104 MMOL/L (ref 95–110)
CO2 SERPL-SCNC: 24 MMOL/L (ref 23–29)
CREAT SERPL-MCNC: 0.9 MG/DL (ref 0.5–1.4)
CTP QC/QA: YES
DIFFERENTIAL METHOD: ABNORMAL
EOSINOPHIL # BLD AUTO: 0.1 K/UL (ref 0–0.5)
EOSINOPHIL NFR BLD: 1.6 % (ref 0–8)
ERYTHROCYTE [DISTWIDTH] IN BLOOD BY AUTOMATED COUNT: 14.9 % (ref 11.5–14.5)
EST. GFR  (AFRICAN AMERICAN): >60 ML/MIN/1.73 M^2
EST. GFR  (NON AFRICAN AMERICAN): >60 ML/MIN/1.73 M^2
GLUCOSE SERPL-MCNC: 106 MG/DL (ref 70–110)
HCT VFR BLD AUTO: 37.5 % (ref 37–48.5)
HGB BLD-MCNC: 12.4 G/DL (ref 12–16)
IMM GRANULOCYTES # BLD AUTO: 0.02 K/UL (ref 0–0.04)
IMM GRANULOCYTES NFR BLD AUTO: 0.3 % (ref 0–0.5)
INFLUENZA A, MOLECULAR: POSITIVE
INFLUENZA B, MOLECULAR: NEGATIVE
INR PPP: 1
LYMPHOCYTES # BLD AUTO: 1.5 K/UL (ref 1–4.8)
LYMPHOCYTES NFR BLD: 22 % (ref 18–48)
MCH RBC QN AUTO: 26.4 PG (ref 27–31)
MCHC RBC AUTO-ENTMCNC: 33.1 G/DL (ref 32–36)
MCV RBC AUTO: 80 FL (ref 82–98)
MONOCYTES # BLD AUTO: 0.4 K/UL (ref 0.3–1)
MONOCYTES NFR BLD: 6.3 % (ref 4–15)
NEUTROPHILS # BLD AUTO: 4.9 K/UL (ref 1.8–7.7)
NEUTROPHILS NFR BLD: 69.1 % (ref 38–73)
NRBC BLD-RTO: 0 /100 WBC
PLATELET # BLD AUTO: 245 K/UL (ref 150–450)
PMV BLD AUTO: 10.6 FL (ref 9.2–12.9)
POTASSIUM SERPL-SCNC: 3.2 MMOL/L (ref 3.5–5.1)
PROT SERPL-MCNC: 7.4 G/DL (ref 6–8.4)
PROTHROMBIN TIME: 12.5 SEC (ref 11.4–13.7)
RBC # BLD AUTO: 4.69 M/UL (ref 4–5.4)
SARS-COV-2 RDRP RESP QL NAA+PROBE: NEGATIVE
SODIUM SERPL-SCNC: 137 MMOL/L (ref 136–145)
SPECIMEN SOURCE: ABNORMAL
TROPONIN I SERPL DL<=0.01 NG/ML-MCNC: <0.03 NG/ML
WBC # BLD AUTO: 7.01 K/UL (ref 3.9–12.7)

## 2022-03-15 PROCEDURE — 25000003 PHARM REV CODE 250: Mod: GY | Performed by: EMERGENCY MEDICINE

## 2022-03-15 PROCEDURE — U0002 COVID-19 LAB TEST NON-CDC: HCPCS | Performed by: EMERGENCY MEDICINE

## 2022-03-15 PROCEDURE — 81025 URINE PREGNANCY TEST: CPT | Performed by: EMERGENCY MEDICINE

## 2022-03-15 PROCEDURE — 80053 COMPREHEN METABOLIC PANEL: CPT | Performed by: EMERGENCY MEDICINE

## 2022-03-15 PROCEDURE — 84484 ASSAY OF TROPONIN QUANT: CPT | Performed by: EMERGENCY MEDICINE

## 2022-03-15 PROCEDURE — 93005 ELECTROCARDIOGRAM TRACING: CPT | Performed by: INTERNAL MEDICINE

## 2022-03-15 PROCEDURE — 85610 PROTHROMBIN TIME: CPT | Performed by: EMERGENCY MEDICINE

## 2022-03-15 PROCEDURE — 93010 ELECTROCARDIOGRAM REPORT: CPT | Mod: ,,, | Performed by: INTERNAL MEDICINE

## 2022-03-15 PROCEDURE — 83880 ASSAY OF NATRIURETIC PEPTIDE: CPT | Performed by: EMERGENCY MEDICINE

## 2022-03-15 PROCEDURE — 85025 COMPLETE CBC W/AUTO DIFF WBC: CPT | Performed by: EMERGENCY MEDICINE

## 2022-03-15 PROCEDURE — 99284 EMERGENCY DEPT VISIT MOD MDM: CPT | Mod: 25

## 2022-03-15 PROCEDURE — 93010 EKG 12-LEAD: ICD-10-PCS | Mod: ,,, | Performed by: INTERNAL MEDICINE

## 2022-03-15 PROCEDURE — 87502 INFLUENZA DNA AMP PROBE: CPT | Performed by: EMERGENCY MEDICINE

## 2022-03-15 RX ORDER — OSELTAMIVIR PHOSPHATE 75 MG/1
75 CAPSULE ORAL 2 TIMES DAILY
Qty: 10 CAPSULE | Refills: 0 | Status: SHIPPED | OUTPATIENT
Start: 2022-03-15 | End: 2022-03-20

## 2022-03-15 RX ORDER — POTASSIUM CHLORIDE 20 MEQ/1
40 TABLET, EXTENDED RELEASE ORAL
Status: COMPLETED | OUTPATIENT
Start: 2022-03-15 | End: 2022-03-15

## 2022-03-15 RX ADMIN — POTASSIUM CHLORIDE 40 MEQ: 1500 TABLET, EXTENDED RELEASE ORAL at 04:03

## 2022-03-15 NOTE — ED PROVIDER NOTES
Encounter Date: 3/15/2022       History     Chief Complaint   Patient presents with    Cough     Feels like razor blades in her chest when she coughs    Fever     Last night of 102    Nasal Congestion     X 2 days     Patient here reported upper respiratory symptoms cough nasal congestion fever this fever started last night to a temporal 1 0 to patient has a history of pneumonia she does smoke states that her children are ill at this time she denies any diarrhea on she does report some posttussive emesis she denies any urinary symptoms she has had a mild sore throat        Review of patient's allergies indicates:   Allergen Reactions    Sulfa (sulfonamide antibiotics) Hives     Past Medical History:   Diagnosis Date    Anxiety     Bipolar 1 disorder, depressed     Depression      Past Surgical History:   Procedure Laterality Date    TONSILLECTOMY, ADENOIDECTOMY      WISDOM TOOTH EXTRACTION       No family history on file.  Social History     Tobacco Use    Smoking status: Current Every Day Smoker     Packs/day: 0.50     Types: Cigarettes     Last attempt to quit: 2020     Years since quittin.9    Smokeless tobacco: Never Used   Substance Use Topics    Alcohol use: Not Currently    Drug use: Never     Review of Systems   Constitutional: Positive for chills, fatigue and fever.   HENT: Positive for congestion and sore throat.    Respiratory: Positive for cough. Negative for shortness of breath.    Cardiovascular: Negative for chest pain.   Gastrointestinal: Positive for nausea and vomiting. Negative for abdominal pain and diarrhea.   All other systems reviewed and are negative.      Physical Exam     Initial Vitals [03/15/22 1325]   BP Pulse Resp Temp SpO2   (!) 141/87 103 18 98.8 °F (37.1 °C) 99 %      MAP       --         Physical Exam    Constitutional: She appears well-developed and well-nourished. No distress.   HENT:   Head: Normocephalic and atraumatic.   Right Ear: External ear normal.    Left Ear: External ear normal.   Posterior pharyngeal erythema   Eyes: Conjunctivae and EOM are normal. Pupils are equal, round, and reactive to light.   Neck: Neck supple.   Normal range of motion.  Cardiovascular: Normal rate, regular rhythm, normal heart sounds and intact distal pulses.   Pulmonary/Chest: Breath sounds normal. No respiratory distress.   Abdominal: Abdomen is soft. Bowel sounds are normal. There is no abdominal tenderness.   Musculoskeletal:         General: No edema. Normal range of motion.      Cervical back: Normal range of motion and neck supple.     Neurological: She is alert and oriented to person, place, and time. GCS score is 15. GCS eye subscore is 4. GCS verbal subscore is 5. GCS motor subscore is 6.   Skin: Skin is warm and dry. Capillary refill takes less than 2 seconds. No rash noted.   Psychiatric: She has a normal mood and affect. Her behavior is normal.         ED Course   Procedures  Labs Reviewed   CBC W/ AUTO DIFFERENTIAL - Abnormal; Notable for the following components:       Result Value    MCV 80 (*)     MCH 26.4 (*)     RDW 14.9 (*)     All other components within normal limits   COMPREHENSIVE METABOLIC PANEL - Abnormal; Notable for the following components:    Potassium 3.2 (*)     All other components within normal limits   INFLUENZA A AND B ANTIGEN - Abnormal; Notable for the following components:    Influenza A, Molecular Positive (*)     All other components within normal limits    Narrative:     Specimen Source->Nasopharyngeal Swab     critical result(s) called and verbal readback obtained from   Samanta Escobar RN ER by CHRISTOPHER 03/15/2022 16:21   TROPONIN I   B-TYPE NATRIURETIC PEPTIDE   PROTIME-INR   SARS-COV-2 RNA AMPLIFICATION, QUAL   POCT URINE PREGNANCY        ECG Results          EKG 12-lead (In process)  Result time 03/15/22 13:48:27    In process by Interface, Lab In [facility] (03/15/22 13:48:27)                 Narrative:    Test Reason : R05.9,    Vent. Rate : 091  BPM     Atrial Rate : 091 BPM     P-R Int : 146 ms          QRS Dur : 086 ms      QT Int : 352 ms       P-R-T Axes : 049 -06 048 degrees     QTc Int : 432 ms    Normal sinus rhythm  Normal ECG  When compared with ECG of 25-JUN-2020 12:21,  No significant change was found    Referred By: JAMAL   SELF           Confirmed By:                             Imaging Results          X-Ray Chest AP Portable (Final result)  Result time 03/15/22 13:52:36    Final result by Beni Arellano MD (03/15/22 13:52:36)                 Narrative:    Reason: cough cough    FINDINGS:  Portable chest at 1332 compared with 12/1/2021 shows normal cardiomediastinal silhouette.    Lungs are clear. Pulmonary vasculature is normal. No acute osseous abnormality.    IMPRESSION:  Negative chest.    Electronically signed by:  Beni Arellano MD  3/15/2022 1:52 PM CDT Workstation: [id number]                               Medications   potassium chloride SA CR tablet 40 mEq (40 mEq Oral Given 3/15/22 0708)     Medical Decision Making:   ED Management:  Patient's laboratory evaluation reviewed she does have evidence of influenza a mildly hypokalemic at given her dose of potassium emergency department her chest x-ray is clear will begin Tamiflu recommend Tylenol Motrin for any fever rest return to ER for any worsened symptoms or new symptoms                      Clinical Impression:   Final diagnoses:  [R05.9] Cough  [J10.1] Influenza A (Primary)          ED Disposition Condition    Discharge Stable        ED Prescriptions     Medication Sig Dispense Start Date End Date Auth. Provider    oseltamivir (TAMIFLU) 75 MG capsule Take 1 capsule (75 mg total) by mouth 2 (two) times daily. for 5 days 10 capsule 3/15/2022 3/20/2022 David Posey MD        Follow-up Information    None          David Posey MD  03/15/22 8216

## 2022-03-15 NOTE — ED NOTES
Bed: 19  Expected date:   Expected time:   Means of arrival:   Comments:  EMS EVAL FOR DEMENTIA/ALZ

## 2022-03-21 ENCOUNTER — PATIENT MESSAGE (OUTPATIENT)
Dept: ADMINISTRATIVE | Facility: HOSPITAL | Age: 27
End: 2022-03-21
Payer: COMMERCIAL

## 2022-03-21 ENCOUNTER — PATIENT OUTREACH (OUTPATIENT)
Dept: ADMINISTRATIVE | Facility: HOSPITAL | Age: 27
End: 2022-03-21
Payer: COMMERCIAL

## 2022-03-21 NOTE — PROGRESS NOTES
Pre-visit Chart review notificationRecords Received, hyper-linked into chart at this time. The following record(s)  below were uploaded for Health Maintenance .    HEP C SCREENING

## 2022-03-21 NOTE — LETTER
March 28, 2022    Gustavo Perry  [address] [place] [address]             [place] [id number]  [place] [facility] [address]  Phone: [phone] Dear Ravin Noel Smith, [facility] is committed to your overall health.  To help you get the most out of each of your visits, we will review your information to make sure you are up to date on all of your recommended tests and/or procedures.       As a new patient to GLORIA COOPER MD, we may not have your complete medical records.  She has found that your chart shows you may be due for:          Health Maintenance Due   Topic Date Due    Lipid Panel  Never done    COVID-19 Vaccine (1) Never done    Pneumococcal Vaccines (Age 0-64) (1 of 2 - PPSV23) Never done    Pap Smear  06/14/2021    Influenza Vaccine (1) 09/01/2021      If you have had any of the above done at another facility, please let us know so that we may obtain copies from that facility.  If you have a copy of these records, please provide a copy for us to scan into your chart.     If you are currently taking medication, please bring it with you to your appointment for review.     Also, if you have any type of Advanced Directives, please bring them with you to your office visit so we may scan them into your chart.        Bessie Santoyo LPN  Performance Improvement Coordinator  [facility]  [address] [place] [address] [phone]

## 2022-03-28 NOTE — PROGRESS NOTES
"Attempted to outreach patient via "ShopPad", no answer after a week. Sending outreach via Mail Out Letter now.  "

## 2022-04-01 ENCOUNTER — LAB VISIT (OUTPATIENT)
Dept: LAB | Facility: HOSPITAL | Age: 27
End: 2022-04-01
Attending: FAMILY MEDICINE
Payer: COMMERCIAL

## 2022-04-01 ENCOUNTER — OFFICE VISIT (OUTPATIENT)
Dept: FAMILY MEDICINE | Facility: CLINIC | Age: 27
End: 2022-04-01
Payer: COMMERCIAL

## 2022-04-01 VITALS
HEART RATE: 72 BPM | SYSTOLIC BLOOD PRESSURE: 126 MMHG | WEIGHT: 214 LBS | DIASTOLIC BLOOD PRESSURE: 84 MMHG | BODY MASS INDEX: 33.59 KG/M2 | RESPIRATION RATE: 16 BRPM | OXYGEN SATURATION: 97 % | HEIGHT: 67 IN

## 2022-04-01 DIAGNOSIS — Z13.6 ENCOUNTER FOR LIPID SCREENING FOR CARDIOVASCULAR DISEASE: ICD-10-CM

## 2022-04-01 DIAGNOSIS — Z76.89 ENCOUNTER TO ESTABLISH CARE WITH NEW DOCTOR: ICD-10-CM

## 2022-04-01 DIAGNOSIS — Z13.6 ENCOUNTER FOR LIPID SCREENING FOR CARDIOVASCULAR DISEASE: Primary | ICD-10-CM

## 2022-04-01 DIAGNOSIS — F17.210 CIGARETTE NICOTINE DEPENDENCE WITHOUT COMPLICATION: ICD-10-CM

## 2022-04-01 DIAGNOSIS — Z13.220 ENCOUNTER FOR LIPID SCREENING FOR CARDIOVASCULAR DISEASE: Primary | ICD-10-CM

## 2022-04-01 DIAGNOSIS — Z13.220 ENCOUNTER FOR LIPID SCREENING FOR CARDIOVASCULAR DISEASE: ICD-10-CM

## 2022-04-01 DIAGNOSIS — E66.09 CLASS 1 OBESITY DUE TO EXCESS CALORIES WITHOUT SERIOUS COMORBIDITY WITH BODY MASS INDEX (BMI) OF 33.0 TO 33.9 IN ADULT: ICD-10-CM

## 2022-04-01 LAB
ALBUMIN SERPL BCP-MCNC: 3.8 G/DL (ref 3.5–5.2)
ALP SERPL-CCNC: 73 U/L (ref 55–135)
ALT SERPL W/O P-5'-P-CCNC: 12 U/L (ref 10–44)
ANION GAP SERPL CALC-SCNC: 10 MMOL/L (ref 8–16)
AST SERPL-CCNC: 12 U/L (ref 10–40)
BASOPHILS # BLD AUTO: 0.03 K/UL (ref 0–0.2)
BASOPHILS NFR BLD: 0.5 % (ref 0–1.9)
BILIRUB SERPL-MCNC: 0.2 MG/DL (ref 0.1–1)
BUN SERPL-MCNC: 12 MG/DL (ref 6–20)
CALCIUM SERPL-MCNC: 9.3 MG/DL (ref 8.7–10.5)
CHLORIDE SERPL-SCNC: 107 MMOL/L (ref 95–110)
CHOLEST SERPL-MCNC: 189 MG/DL (ref 120–199)
CHOLEST/HDLC SERPL: 4.3 {RATIO} (ref 2–5)
CO2 SERPL-SCNC: 23 MMOL/L (ref 23–29)
CREAT SERPL-MCNC: 0.9 MG/DL (ref 0.5–1.4)
DIFFERENTIAL METHOD: ABNORMAL
EOSINOPHIL # BLD AUTO: 0.2 K/UL (ref 0–0.5)
EOSINOPHIL NFR BLD: 2.9 % (ref 0–8)
ERYTHROCYTE [DISTWIDTH] IN BLOOD BY AUTOMATED COUNT: 14.5 % (ref 11.5–14.5)
EST. GFR  (AFRICAN AMERICAN): >60 ML/MIN/1.73 M^2
EST. GFR  (NON AFRICAN AMERICAN): >60 ML/MIN/1.73 M^2
ESTIMATED AVG GLUCOSE: 105 MG/DL (ref 68–131)
GLUCOSE SERPL-MCNC: 86 MG/DL (ref 70–110)
HBA1C MFR BLD: 5.3 % (ref 4–5.6)
HCT VFR BLD AUTO: 40.1 % (ref 37–48.5)
HDLC SERPL-MCNC: 44 MG/DL (ref 40–75)
HDLC SERPL: 23.3 % (ref 20–50)
HGB BLD-MCNC: 12.8 G/DL (ref 12–16)
IMM GRANULOCYTES # BLD AUTO: 0.01 K/UL (ref 0–0.04)
IMM GRANULOCYTES NFR BLD AUTO: 0.2 % (ref 0–0.5)
LDLC SERPL CALC-MCNC: 104.8 MG/DL (ref 63–159)
LYMPHOCYTES # BLD AUTO: 2.5 K/UL (ref 1–4.8)
LYMPHOCYTES NFR BLD: 44.9 % (ref 18–48)
MCH RBC QN AUTO: 26.3 PG (ref 27–31)
MCHC RBC AUTO-ENTMCNC: 31.9 G/DL (ref 32–36)
MCV RBC AUTO: 82 FL (ref 82–98)
MONOCYTES # BLD AUTO: 0.3 K/UL (ref 0.3–1)
MONOCYTES NFR BLD: 5.2 % (ref 4–15)
NEUTROPHILS # BLD AUTO: 2.6 K/UL (ref 1.8–7.7)
NEUTROPHILS NFR BLD: 46.3 % (ref 38–73)
NONHDLC SERPL-MCNC: 145 MG/DL
NRBC BLD-RTO: 0 /100 WBC
PLATELET # BLD AUTO: 278 K/UL (ref 150–450)
PMV BLD AUTO: 10.1 FL (ref 9.2–12.9)
POTASSIUM SERPL-SCNC: 4.1 MMOL/L (ref 3.5–5.1)
PROT SERPL-MCNC: 7.5 G/DL (ref 6–8.4)
RBC # BLD AUTO: 4.87 M/UL (ref 4–5.4)
SODIUM SERPL-SCNC: 140 MMOL/L (ref 136–145)
TRIGL SERPL-MCNC: 201 MG/DL (ref 30–150)
TSH SERPL DL<=0.005 MIU/L-ACNC: 1.01 UIU/ML (ref 0.4–4)
WBC # BLD AUTO: 5.57 K/UL (ref 3.9–12.7)

## 2022-04-01 PROCEDURE — 3008F PR BODY MASS INDEX (BMI) DOCUMENTED: ICD-10-PCS | Mod: S$GLB,,, | Performed by: FAMILY MEDICINE

## 2022-04-01 PROCEDURE — 3074F PR MOST RECENT SYSTOLIC BLOOD PRESSURE < 130 MM HG: ICD-10-PCS | Mod: S$GLB,,, | Performed by: FAMILY MEDICINE

## 2022-04-01 PROCEDURE — 3074F SYST BP LT 130 MM HG: CPT | Mod: S$GLB,,, | Performed by: FAMILY MEDICINE

## 2022-04-01 PROCEDURE — 99203 PR OFFICE/OUTPT VISIT, NEW, LEVL III, 30-44 MIN: ICD-10-PCS | Mod: S$GLB,,, | Performed by: FAMILY MEDICINE

## 2022-04-01 PROCEDURE — 36415 COLL VENOUS BLD VENIPUNCTURE: CPT | Performed by: FAMILY MEDICINE

## 2022-04-01 PROCEDURE — 3008F BODY MASS INDEX DOCD: CPT | Mod: S$GLB,,, | Performed by: FAMILY MEDICINE

## 2022-04-01 PROCEDURE — 1159F PR MEDICATION LIST DOCUMENTED IN MEDICAL RECORD: ICD-10-PCS | Mod: S$GLB,,, | Performed by: FAMILY MEDICINE

## 2022-04-01 PROCEDURE — 99203 OFFICE O/P NEW LOW 30 MIN: CPT | Mod: S$GLB,,, | Performed by: FAMILY MEDICINE

## 2022-04-01 PROCEDURE — 80053 COMPREHEN METABOLIC PANEL: CPT | Performed by: FAMILY MEDICINE

## 2022-04-01 PROCEDURE — 80061 LIPID PANEL: CPT | Performed by: FAMILY MEDICINE

## 2022-04-01 PROCEDURE — 99999 PR PBB SHADOW E&M-EST. PATIENT-LVL III: CPT | Mod: PBBFAC,,, | Performed by: FAMILY MEDICINE

## 2022-04-01 PROCEDURE — 83036 HEMOGLOBIN GLYCOSYLATED A1C: CPT | Performed by: FAMILY MEDICINE

## 2022-04-01 PROCEDURE — 99213 OFFICE O/P EST LOW 20 MIN: CPT | Mod: PBBFAC | Performed by: FAMILY MEDICINE

## 2022-04-01 PROCEDURE — 99999 PR PBB SHADOW E&M-EST. PATIENT-LVL III: ICD-10-PCS | Mod: PBBFAC,,, | Performed by: FAMILY MEDICINE

## 2022-04-01 PROCEDURE — 3079F DIAST BP 80-89 MM HG: CPT | Mod: S$GLB,,, | Performed by: FAMILY MEDICINE

## 2022-04-01 PROCEDURE — 1159F MED LIST DOCD IN RCRD: CPT | Mod: S$GLB,,, | Performed by: FAMILY MEDICINE

## 2022-04-01 PROCEDURE — 84443 ASSAY THYROID STIM HORMONE: CPT | Performed by: FAMILY MEDICINE

## 2022-04-01 PROCEDURE — 85025 COMPLETE CBC W/AUTO DIFF WBC: CPT | Performed by: FAMILY MEDICINE

## 2022-04-01 PROCEDURE — 3079F PR MOST RECENT DIASTOLIC BLOOD PRESSURE 80-89 MM HG: ICD-10-PCS | Mod: S$GLB,,, | Performed by: FAMILY MEDICINE

## 2022-04-01 RX ORDER — IBUPROFEN 200 MG
200 TABLET ORAL EVERY 6 HOURS PRN
COMMUNITY
End: 2022-07-06

## 2022-04-01 NOTE — PROGRESS NOTES
"[facility] - Clinic Note    Subjective      Ms. Perry is a 26 y.o. female who presents to clinic to establish care.     No on any medications.   Last pap was in  by Dr. Bose prior to pregnancy  Overall doing well.    [facility] Gustavo has a past medical history of Anxiety, Bipolar 1 disorder, depressed, Depression, and PTSD (post-traumatic stress disorder).   PSXH Gustavo has a past surgical history that includes TONSILLECTOMY, ADENOIDECTOMY; [place] tooth extraction; Tubal ligation; and  section.    Gustavo's family history includes Hypertension in her mother; No Known Problems in her father.   SH Gustavo reports that she has been smoking cigarettes. She has a 4.50 pack-year smoking history. She has never used smokeless tobacco. She reports previous alcohol use. She reports that she does not use drugs.   ALG Gustavo is allergic to sulfa (sulfonamide antibiotics).   MED Gustavo has a current medication list which includes the following prescription(s): ibuprofen.     Review of Systems   Constitutional: Negative for activity change, appetite change, chills, fatigue and fever.   Eyes: Negative for visual disturbance.   Respiratory: Negative for cough and shortness of breath.    Cardiovascular: Negative for chest pain, palpitations and leg swelling.   Gastrointestinal: Negative for abdominal pain, nausea and vomiting.   Skin: Negative for wound.   Neurological: Negative for dizziness and headaches.   Psychiatric/Behavioral: Negative for confusion.     Objective     /84 (BP Location: Left arm, Patient Position: Sitting, BP Method: Medium (Manual))   Pulse 72   Resp 16   Ht 5' 7" (1.702 m)   Wt 97.1 kg (214 lb)   LMP 2022 (Exact Date)   SpO2 97%   BMI 33.52 kg/m²     Physical Exam   Constitutional: She appears well-developed, well-nourished and obese.  Non-toxic appearance. She does not appear ill. No distress.   HENT:   Head: Normocephalic and atraumatic.   Eyes: Right eye exhibits no discharge. " Left eye exhibits no discharge.   Cardiovascular: Normal rate, regular rhythm, normal heart sounds and normal pulses. Exam reveals no gallop and no friction rub.   No murmur heard.  Pulmonary/Chest: Effort normal and breath sounds normal. No respiratory distress. She has no wheezes. She has no rhonchi. She has no rales.   Abdominal: Normal appearance.   Musculoskeletal:      Cervical back: Neck supple. No tenderness.   Lymphadenopathy:     She has no cervical adenopathy.   Neurological: She is alert.   Skin: Skin is warm and dry. Capillary refill takes less than 2 seconds. She is not diaphoretic.   Psychiatric: Her behavior is normal. Mood, judgment and thought content normal.   Vitals reviewed.     Assessment/Plan     Gustavo was seen today for establish care.    Diagnoses and all orders for this visit:  -New patient and new problem to me    Encounter for lipid screening for cardiovascular disease  -     Lipid Panel; Future    Class 1 obesity due to excess calories without serious comorbidity with body mass index (BMI) of 33.0 to 33.9 in adult  -     CBC Auto Differential; Future  -     Comprehensive Metabolic Panel; Future  -     TSH; Future  -     Hemoglobin A1C; Future    Cigarette nicotine dependence without complication  -     Ambulatory referral/consult to Smoking Cessation Program; Future    Encounter to establish care with new doctor      Follow up in about 1 year (around 4/1/2023), or if symptoms worsen or fail to improve.    Future Appointments   Date Time Provider Department Center   4/4/2023 10:00 AM Wale James MD [facility]       Wale James MD  Family Medicine  [facility]

## 2022-04-06 ENCOUNTER — TELEPHONE (OUTPATIENT)
Dept: FAMILY MEDICINE | Facility: CLINIC | Age: 27
End: 2022-04-06
Payer: COMMERCIAL

## 2022-04-06 NOTE — TELEPHONE ENCOUNTER
----- Message from Enrique Manning sent at 4/6/2022  8:14 AM CDT -----  Regarding: Call Back  Who Called: pt         What is the reason for the call: pt is calling in regards to lab results. Please contact to further assist.          Can patient be contacted on Mofanghart: No          Call back number: [phone]

## 2022-04-06 NOTE — TELEPHONE ENCOUNTER
Spoke with patient. Informed patient the provider needs to review her results and we will reach out once she does. Patient voiced understanding

## 2022-04-19 ENCOUNTER — TELEPHONE (OUTPATIENT)
Dept: FAMILY MEDICINE | Facility: CLINIC | Age: 27
End: 2022-04-19
Payer: COMMERCIAL

## 2022-04-19 DIAGNOSIS — R11.0 NAUSEA: Primary | ICD-10-CM

## 2022-04-19 NOTE — TELEPHONE ENCOUNTER
----- Message from Esthela Pino sent at 4/19/2022  8:02 AM CDT -----  Contact: Patient  Type:  Test Results    Who Called:  Patient     Name of Test (Lab/Mammo/Etc):  Labs    Date of Test:  04/01    Ordering Provider:  Dr Issa \    Where the test was performed:  [facility]     Would the patient rather a call back or a response via [facility]?  Call    Best Call Back Number:  [phone] (home)     Additional Information:

## 2022-04-19 NOTE — TELEPHONE ENCOUNTER
----- Message from Wale James MD sent at 4/19/2022  9:20 AM CDT -----  Your thyroid hormone, electrolyte, kidney function, liver enzymes, and blood count is all normal.   Your cholesterol panel revealed that your triglycerides are slightly high. This is the fatty part of your cholesterol. This can be decreased with dietary changes such as increasing fiber in your diet which can be found in oats, grains, green leafy vegetables, and fruits. Also avoiding fried, fatty, and oily foods as well as red meat and alcohol.

## 2022-04-19 NOTE — TELEPHONE ENCOUNTER
Pt is requesting order to be checked for berkowitz's disease as it runs in her family. Patient states she does get nauseous to the point where she almost throws up and sometimes unable to eat. Please advise!

## 2022-05-16 ENCOUNTER — NURSE TRIAGE (OUTPATIENT)
Dept: ADMINISTRATIVE | Facility: CLINIC | Age: 27
End: 2022-05-16
Payer: COMMERCIAL

## 2022-05-16 ENCOUNTER — TELEPHONE (OUTPATIENT)
Dept: FAMILY MEDICINE | Facility: CLINIC | Age: 27
End: 2022-05-16
Payer: COMMERCIAL

## 2022-05-16 ENCOUNTER — HOSPITAL ENCOUNTER (EMERGENCY)
Facility: HOSPITAL | Age: 27
Discharge: HOME | End: 2022-05-16
Attending: FAMILY MEDICINE
Payer: COMMERCIAL

## 2022-05-16 VITALS
TEMPERATURE: 99 F | OXYGEN SATURATION: 99 % | RESPIRATION RATE: 17 BRPM | HEART RATE: 76 BPM | HEIGHT: 67 IN | DIASTOLIC BLOOD PRESSURE: 76 MMHG | WEIGHT: 209 LBS | BODY MASS INDEX: 32.8 KG/M2 | SYSTOLIC BLOOD PRESSURE: 122 MMHG

## 2022-05-16 DIAGNOSIS — R10.11 RIGHT UPPER QUADRANT ABDOMINAL PAIN: Primary | ICD-10-CM

## 2022-05-16 DIAGNOSIS — R11.0 NAUSEA: ICD-10-CM

## 2022-05-16 LAB
ALBUMIN SERPL BCP-MCNC: 3.8 G/DL (ref 3.5–5.2)
ALP SERPL-CCNC: 77 U/L (ref 55–135)
ALT SERPL W/O P-5'-P-CCNC: 18 U/L (ref 10–44)
ANION GAP SERPL CALC-SCNC: 7 MMOL/L (ref 8–16)
AST SERPL-CCNC: 16 U/L (ref 10–40)
B-HCG UR QL: NEGATIVE
BASOPHILS # BLD AUTO: 0.03 K/UL (ref 0–0.2)
BASOPHILS NFR BLD: 0.5 % (ref 0–1.9)
BILIRUB SERPL-MCNC: 0.2 MG/DL (ref 0.1–1)
BILIRUB UR QL STRIP: NEGATIVE
BUN SERPL-MCNC: 9 MG/DL (ref 6–20)
CALCIUM SERPL-MCNC: 9.1 MG/DL (ref 8.7–10.5)
CHLORIDE SERPL-SCNC: 108 MMOL/L (ref 95–110)
CLARITY UR: CLEAR
CO2 SERPL-SCNC: 25 MMOL/L (ref 23–29)
COLOR UR: YELLOW
CREAT SERPL-MCNC: 0.9 MG/DL (ref 0.5–1.4)
DIFFERENTIAL METHOD: ABNORMAL
EOSINOPHIL # BLD AUTO: 0.1 K/UL (ref 0–0.5)
EOSINOPHIL NFR BLD: 0.9 % (ref 0–8)
ERYTHROCYTE [DISTWIDTH] IN BLOOD BY AUTOMATED COUNT: 14.9 % (ref 11.5–14.5)
EST. GFR  (AFRICAN AMERICAN): >60 ML/MIN/1.73 M^2
EST. GFR  (NON AFRICAN AMERICAN): >60 ML/MIN/1.73 M^2
GLUCOSE SERPL-MCNC: 85 MG/DL (ref 70–110)
GLUCOSE UR QL STRIP: NEGATIVE
HCT VFR BLD AUTO: 36 % (ref 37–48.5)
HGB BLD-MCNC: 11.9 G/DL (ref 12–16)
HGB UR QL STRIP: ABNORMAL
IMM GRANULOCYTES # BLD AUTO: 0.02 K/UL (ref 0–0.04)
IMM GRANULOCYTES NFR BLD AUTO: 0.3 % (ref 0–0.5)
KETONES UR QL STRIP: NEGATIVE
LEUKOCYTE ESTERASE UR QL STRIP: NEGATIVE
LYMPHOCYTES # BLD AUTO: 3 K/UL (ref 1–4.8)
LYMPHOCYTES NFR BLD: 46.6 % (ref 18–48)
MCH RBC QN AUTO: 26.3 PG (ref 27–31)
MCHC RBC AUTO-ENTMCNC: 33.1 G/DL (ref 32–36)
MCV RBC AUTO: 80 FL (ref 82–98)
MONOCYTES # BLD AUTO: 0.3 K/UL (ref 0.3–1)
MONOCYTES NFR BLD: 4 % (ref 4–15)
NEUTROPHILS # BLD AUTO: 3.1 K/UL (ref 1.8–7.7)
NEUTROPHILS NFR BLD: 47.7 % (ref 38–73)
NITRITE UR QL STRIP: NEGATIVE
NRBC BLD-RTO: 0 /100 WBC
PH UR STRIP: 6 [PH] (ref 5–8)
PLATELET # BLD AUTO: 240 K/UL (ref 150–450)
PMV BLD AUTO: 10.8 FL (ref 9.2–12.9)
POTASSIUM SERPL-SCNC: 3.7 MMOL/L (ref 3.5–5.1)
PROT SERPL-MCNC: 7.1 G/DL (ref 6–8.4)
PROT UR QL STRIP: NEGATIVE
RBC # BLD AUTO: 4.52 M/UL (ref 4–5.4)
SODIUM SERPL-SCNC: 140 MMOL/L (ref 136–145)
SP GR UR STRIP: 1.02 (ref 1–1.03)
URN SPEC COLLECT METH UR: ABNORMAL
UROBILINOGEN UR STRIP-ACNC: NEGATIVE EU/DL
WBC # BLD AUTO: 6.46 K/UL (ref 3.9–12.7)

## 2022-05-16 PROCEDURE — 74176 CT ABD & PELVIS W/O CONTRAST: CPT | Mod: TC

## 2022-05-16 PROCEDURE — 25000003 PHARM REV CODE 250: Performed by: NURSE PRACTITIONER

## 2022-05-16 PROCEDURE — 80053 COMPREHEN METABOLIC PANEL: CPT | Performed by: NURSE PRACTITIONER

## 2022-05-16 PROCEDURE — 96374 THER/PROPH/DIAG INJ IV PUSH: CPT

## 2022-05-16 PROCEDURE — 96361 HYDRATE IV INFUSION ADD-ON: CPT

## 2022-05-16 PROCEDURE — 63600175 PHARM REV CODE 636 W HCPCS: Performed by: NURSE PRACTITIONER

## 2022-05-16 PROCEDURE — 99285 EMERGENCY DEPT VISIT HI MDM: CPT | Mod: 25

## 2022-05-16 PROCEDURE — 85025 COMPLETE CBC W/AUTO DIFF WBC: CPT | Performed by: NURSE PRACTITIONER

## 2022-05-16 PROCEDURE — 74176 CT ABD & PELVIS W/O CONTRAST: CPT | Mod: 26,,, | Performed by: RADIOLOGY

## 2022-05-16 PROCEDURE — 81025 URINE PREGNANCY TEST: CPT | Performed by: FAMILY MEDICINE

## 2022-05-16 PROCEDURE — 74176 CT ABDOMEN PELVIS WITHOUT CONTRAST: ICD-10-PCS | Mod: 26,,, | Performed by: RADIOLOGY

## 2022-05-16 PROCEDURE — 81003 URINALYSIS AUTO W/O SCOPE: CPT | Performed by: FAMILY MEDICINE

## 2022-05-16 RX ORDER — ONDANSETRON 2 MG/ML
4 INJECTION INTRAMUSCULAR; INTRAVENOUS
Status: COMPLETED | OUTPATIENT
Start: 2022-05-16 | End: 2022-05-16

## 2022-05-16 RX ADMIN — ONDANSETRON 4 MG: 2 INJECTION INTRAMUSCULAR; INTRAVENOUS at 01:05

## 2022-05-16 RX ADMIN — SODIUM CHLORIDE 1000 ML: 0.9 INJECTION, SOLUTION INTRAVENOUS at 01:05

## 2022-05-16 NOTE — TELEPHONE ENCOUNTER
Pt called in c/o severe abd pain, front end tried to connect her but pt had hung up. contacted pt again and states she spoke with a nurse in her providers office already so she no longer needs our services.     Reason for Disposition   Caller has already spoken with the PCP (or office), and has no further questions    Protocols used: NO CONTACT OR DUPLICATE CONTACT CALL-A-[place]

## 2022-05-16 NOTE — TELEPHONE ENCOUNTER
"Spoke with patient. Patient c/o pain upper middle/right quadrant, nausea, patient states, "pain is more aggravating than anything" dull/sharp pain that comes/goes, loose stool. Patient feels it may be her gallbladder. Advised patient I would send a message to the provider, but if pain gets worse she should report to local ER. Patient voiced understanding.   "

## 2022-05-16 NOTE — TELEPHONE ENCOUNTER
Spoke with patient. Notified patient to report to local ED for eval. Patient voiced understanding

## 2022-05-16 NOTE — ED NOTES
States nausea relieved some. IV fluids complete. Patient received 1 liter of 0.9% NaCl. Warm blanket provided.

## 2022-05-16 NOTE — TELEPHONE ENCOUNTER
----- Message from Racquelcolin Richeyjd sent at 5/16/2022  8:12 AM CDT -----  Contact: pt at [phone]  Type: Needs Medical Advice  Who Called:  pt  Best Call Back Number: [phone]  Additional Information: pt is calling the office to speak with the nurse in regards to if the pt needs to go to the ED. The pt is having some pain and thinks it is her gallbladder. Please call back and advise.

## 2022-05-16 NOTE — ED PROVIDER NOTES
Encounter Date: 2022       History     Chief Complaint   Patient presents with    Ruq abd pain      Increased nausea with eating onset 3-4 days pta     The history is provided by the patient.   Abdominal Pain  The current episode started several days ago. The onset of the illness was gradual. The abdominal pain is located in the RUQ. The abdominal pain radiates to the right flank. The severity of the abdominal pain is 5/10. The abdominal pain is relieved by nothing. The abdominal pain is exacerbated by vomiting. The other symptoms of the illness include nausea and vomiting.     Review of patient's allergies indicates:   Allergen Reactions    Sulfa (sulfonamide antibiotics) Hives     Past Medical History:   Diagnosis Date    Anxiety     Bipolar 1 disorder, depressed     Depression     PTSD (post-traumatic stress disorder)      Past Surgical History:   Procedure Laterality Date     SECTION      TONSILLECTOMY, ADENOIDECTOMY      TUBAL LIGATION      WISDOM TOOTH EXTRACTION       Family History   Problem Relation Age of Onset    Hypertension Mother     No Known Problems Father      Social History     Tobacco Use    Smoking status: Current Every Day Smoker     Packs/day: 0.50     Years: 9.00     Pack years: 4.50     Types: Cigarettes    Smokeless tobacco: Never Used   Substance Use Topics    Alcohol use: Not Currently     Comment: socially    Drug use: Never     Review of Systems   Gastrointestinal: Positive for abdominal pain, nausea and vomiting.   All other systems reviewed and are negative.      Physical Exam     Initial Vitals [22 1240]   BP Pulse Resp Temp SpO2   120/83 79 20 99.1 °F (37.3 °C) 98 %      MAP       --         Physical Exam    Nursing note and vitals reviewed.  Constitutional: She appears well-developed and well-nourished. No distress.   HENT:   Head: Normocephalic and atraumatic.   Eyes: EOM are normal. Pupils are equal, round, and reactive to light.   Neck:   Normal  range of motion.  Cardiovascular: Normal rate and regular rhythm.   No murmur heard.  Pulmonary/Chest: Breath sounds normal. No respiratory distress. She has no wheezes. She has no rhonchi. She has no rales. She exhibits no tenderness.   Abdominal: Bowel sounds are normal. There is abdominal tenderness in the right upper quadrant.   No right CVA tenderness.  No left CVA tenderness. There is positive Xie's sign.   Musculoskeletal:         General: Normal range of motion.      Cervical back: Normal range of motion.     Neurological: She is alert and oriented to person, place, and time. She has normal strength. GCS score is 15. GCS eye subscore is 4. GCS verbal subscore is 5. GCS motor subscore is 6.   Skin: Skin is warm and dry.   Psychiatric: She has a normal mood and affect.         ED Course   Procedures  Labs Reviewed   URINALYSIS, REFLEX TO URINE CULTURE - Abnormal; Notable for the following components:       Result Value    Occult Blood UA Trace (*)     All other components within normal limits    Narrative:     Preferred Collection Type->Urine, Clean Catch  Specimen Source->Urine   COMPREHENSIVE METABOLIC PANEL - Abnormal; Notable for the following components:    Anion Gap 7 (*)     All other components within normal limits   CBC W/ AUTO DIFFERENTIAL - Abnormal; Notable for the following components:    Hemoglobin 11.9 (*)     Hematocrit 36.0 (*)     MCV 80 (*)     MCH 26.3 (*)     RDW 14.9 (*)     All other components within normal limits   PREGNANCY TEST, URINE RAPID    Narrative:     Specimen Source->Urine          Imaging Results          CT Abdomen Pelvis  Without Contrast (Final result)  Result time 05/16/22 13:56:24    Final result by Deangelo Torres MD (05/16/22 13:56:24)                 Impression:      1. No renal calculi.  2. Small fat containing umbilical hernia.      Electronically signed by: Deangelo Torres  Date:    05/16/2022  Time:    13:56             Narrative:    EXAMINATION:  CT ABDOMEN  PELVIS WITHOUT CONTRAST    CLINICAL HISTORY:  Abdominal pain, acute, nonlocalized;    TECHNIQUE:  Low dose axial images, sagittal and coronal reformations were obtained from the lung bases to the pubic symphysis.  Oral contrast was not administered.    COMPARISON:  CT 06/28/2017.    FINDINGS:  The liver and spleen are normal in size and attenuation.  The gallbladder, pancreas and adrenal glands are unremarkable.    Kidneys are normal in size and attenuation.  No renal calculi.  No changes of hydronephrosis.  No perinephric inflammatory change.    Air and stool throughout the loops of colon.  No mesenteric inflammatory change.  No CT evidence for bowel obstruction.  Appendix is normal in caliber.    The bladder is decompressed.  Uterus and ovaries are normal in size and attenuation.  No free fluid in cul-de-sac.    No significant mesenteric or retroperitoneal lymphadenopathy.  There is a small fat containing umbilical hernia.                                 Medications   sodium chloride 0.9% bolus 1,000 mL (0 mLs Intravenous Stopped 5/16/22 1412)   ondansetron injection 4 mg (4 mg Intravenous Given 5/16/22 1333)     Medical Decision Making:   Differential Diagnosis:   Differential includes diverticulitis, appendicitis, cholelithiasis, cholecystitis, gastritis, GERD, mesenteric ischemic, AAA, pancreatitis, pyelonephritis, food poisoning, dehydration, CHETAN, ectopic pregnancy, ovarian torsion, ovarian cyst, pyelonephritis/renal colic, pelvic inflammatory disease, endometriosis, Psoas abscess, Hernia, urinary tract infection    ED Management:  After fluid/nausea medication, she reported no pain at the moment   Ruled out acute findings CT abdomen/pelvis  Offered prescription of nausea, she stated she has some at home.   Please return for new, changing, or worsening pain. The patient was also instructed that follow up with a primary care physician is strongly recommended after any visit to the ED.  Patient expressed  understanding and agreed with treatment plan and was discharged in stable condition.                         Clinical Impression:   Final diagnoses:  [R10.11] Right upper quadrant abdominal pain (Primary)  [R11.0] Nausea          ED Disposition Condition    Discharge Stable        ED Prescriptions     None        Follow-up Information     Follow up With Specialties Details Why Contact Info    Wale James MD Family Medicine In 2 days  [address] [facility] [address]  [phone]      Gastroenterology  Schedule an appointment as soon as possible for a visit       [facility] Emergency Dept Emergency Medicine  If symptoms worsen [address] [facility] [id number] [phone]           Chuy Wasserman NP  05/16/22 1761

## 2022-05-17 ENCOUNTER — TELEPHONE (OUTPATIENT)
Dept: FAMILY MEDICINE | Facility: CLINIC | Age: 27
End: 2022-05-17
Payer: COMMERCIAL

## 2022-05-17 ENCOUNTER — OFFICE VISIT (OUTPATIENT)
Dept: FAMILY MEDICINE | Facility: CLINIC | Age: 27
End: 2022-05-17
Payer: COMMERCIAL

## 2022-05-17 DIAGNOSIS — R10.11 RUQ ABDOMINAL PAIN: Primary | ICD-10-CM

## 2022-05-17 DIAGNOSIS — R11.0 NAUSEA: ICD-10-CM

## 2022-05-17 PROCEDURE — 3044F HG A1C LEVEL LT 7.0%: CPT | Mod: 95,,, | Performed by: FAMILY MEDICINE

## 2022-05-17 PROCEDURE — 3044F PR MOST RECENT HEMOGLOBIN A1C LEVEL <7.0%: ICD-10-PCS | Mod: 95,,, | Performed by: FAMILY MEDICINE

## 2022-05-17 PROCEDURE — 99214 OFFICE O/P EST MOD 30 MIN: CPT | Mod: 95,,, | Performed by: FAMILY MEDICINE

## 2022-05-17 PROCEDURE — 99214 PR OFFICE/OUTPT VISIT, EST, LEVL IV, 30-39 MIN: ICD-10-PCS | Mod: 95,,, | Performed by: FAMILY MEDICINE

## 2022-05-17 RX ORDER — ONDANSETRON 4 MG/1
4 TABLET, ORALLY DISINTEGRATING ORAL EVERY 6 HOURS PRN
Qty: 30 TABLET | Refills: 1 | Status: SHIPPED | OUTPATIENT
Start: 2022-05-17 | End: 2022-09-23 | Stop reason: ALTCHOICE

## 2022-05-17 NOTE — TELEPHONE ENCOUNTER
----- Message from Jackelyn Tuttle sent at 5/17/2022  8:51 AM CDT -----  Contact: [phone]  Type:  Patient Returning Call    Who Called:  Pt  Who Left Message for Patient: Vikki   Does the patient know what this is regarding?:  Yes  Best Call Back Number:  [phone]    Additional Information:  Pls call back abd advise

## 2022-05-17 NOTE — TELEPHONE ENCOUNTER
----- Message from Sonia Simmons sent at 5/17/2022  8:20 AM CDT -----  Contact: self  Patient went to [facility] on 5/16 for her stomach pains and diarrhea.  Wants to see Dr Rivas but doesn't know if she should see her before she sees Dr Andera Manning on 5/10.  She is really having trouble eating, everything she eats goes right threw her.  Call back at [phone] (home) and thanks

## 2022-05-17 NOTE — PROGRESS NOTES
[facility] - Clinic Note    Subjective    The patient location is: home  The chief complaint leading to consultation is: stomach pain    Visit type: audiovisual    Face to Face time with patient: 7 minutes of total time spent on the encounter, which includes face to face time and non-face to face time preparing to see the patient (eg, review of tests), Obtaining and/or reviewing separately obtained history, Documenting clinical information in the electronic or other health record, Independently interpreting results (not separately reported) and communicating results to the patient/family/caregiver, or Care coordination (not separately reported).         Each patient to whom he or she provides medical services by telemedicine is:  (1) informed of the relationship between the physician and patient and the respective role of any other health care provider with respect to management of the patient; and (2) notified that he or she may decline to receive medical services by telemedicine and may withdraw from such care at any time.    Notes:     Ms. Perry is a 26 y.o. female who presents for a VV.       Seen in the ED yesterday.   Complains of abdominal pain for the past few days.   Has gotten worse gradually.   Locates the pain in the right upper quadrant and epigastric region.   Associated with nausea and vomiting.   Denies change in color of stool.   Admits to bloating.  No alleviating factors.      PMH Gustavo has a past medical history of Anxiety, Bipolar 1 disorder, depressed, Depression, and PTSD (post-traumatic stress disorder).   PSXH Gustavo has a past surgical history that includes TONSILLECTOMY, ADENOIDECTOMY; [place] tooth extraction; Tubal ligation; and  section.    Gustavo's family history includes Hypertension in her mother; No Known Problems in her father.    Gustavo reports that she has been smoking cigarettes. She has a 4.50 pack-year smoking history. She has never used smokeless tobacco. She  reports previous alcohol use. She reports that she does not use drugs.   ELROY Chen is allergic to sulfa (sulfonamide antibiotics).   DIANNE Chen has a current medication list which includes the following prescription(s): ibuprofen and ondansetron.     Review of Systems   Constitutional: Negative for activity change and unexpected weight change.   HENT: Negative for hearing loss, rhinorrhea and trouble swallowing.    Eyes: Negative for discharge and visual disturbance.   Respiratory: Negative for chest tightness and wheezing.    Cardiovascular: Negative for chest pain and palpitations.   Gastrointestinal: Positive for abdominal pain, diarrhea and vomiting. Negative for blood in stool and constipation.   Endocrine: Negative for polydipsia and polyuria.   Genitourinary: Negative for difficulty urinating, dysuria, hematuria and menstrual problem.   Musculoskeletal: Negative for arthralgias, joint swelling and neck pain.   Skin: Negative for wound.   Neurological: Negative for weakness and headaches.   Psychiatric/Behavioral: Negative for confusion and dysphoric mood.     Objective     LMP 05/15/2022     Physical Exam   Constitutional:  Non-toxic appearance. She does not appear ill. No distress.   HENT:   Head: Normocephalic and atraumatic.   Right Ear: External ear normal.   Left Ear: External ear normal.   Eyes: Right eye exhibits no discharge. Left eye exhibits no discharge.   Pulmonary/Chest: Effort normal. No respiratory distress.   Speaks in complete sentences without notable SOB. No tachypnea.    Abdominal: Normal appearance.   Neurological: She is alert.   Skin: She is not diaphoretic.   Psychiatric: Her behavior is normal. Mood, judgment and thought content normal.      Assessment/Plan     Diagnoses and all orders for this visit:    RUQ abdominal pain  -     US Abdomen Complete; Future  -     H. pylori antigen, stool; Future  -     ondansetron (ZOFRAN-ODT) 4 MG TbDL; Take 1 tablet (4 mg total) by mouth every 6  (six) hours as needed (nausea or vomiting).    Nausea  -     ondansetron (ZOFRAN-ODT) 4 MG TbDL; Take 1 tablet (4 mg total) by mouth every 6 (six) hours as needed (nausea or vomiting).        Future Appointments   Date Time Provider Department Center   6/10/2022  8:20 AM Andrea Manning MD [facility] [place]  [facility]   6/23/2022  8:45 AM Eric Huston MD [facility]   4/4/2023 10:00 AM Wale James MD [facility]       Wale James MD  Family Medicine  [facility]

## 2022-05-19 ENCOUNTER — LAB VISIT (OUTPATIENT)
Dept: LAB | Facility: HOSPITAL | Age: 27
End: 2022-05-19
Attending: FAMILY MEDICINE
Payer: COMMERCIAL

## 2022-05-19 DIAGNOSIS — R10.11 RUQ ABDOMINAL PAIN: ICD-10-CM

## 2022-05-19 PROCEDURE — 87338 HPYLORI STOOL AG IA: CPT | Performed by: FAMILY MEDICINE

## 2022-05-20 ENCOUNTER — PATIENT MESSAGE (OUTPATIENT)
Dept: FAMILY MEDICINE | Facility: CLINIC | Age: 27
End: 2022-05-20
Payer: COMMERCIAL

## 2022-05-23 ENCOUNTER — TELEPHONE (OUTPATIENT)
Dept: FAMILY MEDICINE | Facility: CLINIC | Age: 27
End: 2022-05-23
Payer: COMMERCIAL

## 2022-05-23 NOTE — TELEPHONE ENCOUNTER
----- Message from Sid Matamoros, Patient Care Assistant sent at 5/23/2022  3:26 PM CDT -----  Contact: Pt  Type: Test Results    Who Called: Pt  Name of Test:  (labs, xray etc..) Stool Sample  Date of Test: Last Week  Ordering Provider: Erika  Where the test performed: [facility] Call Back Number: [phone]  Additional Info-Please Advise-Thank you~

## 2022-05-24 ENCOUNTER — TELEPHONE (OUTPATIENT)
Dept: ORTHOPEDICS | Facility: CLINIC | Age: 27
End: 2022-05-24
Payer: COMMERCIAL

## 2022-05-24 ENCOUNTER — TELEPHONE (OUTPATIENT)
Dept: FAMILY MEDICINE | Facility: CLINIC | Age: 27
End: 2022-05-24
Payer: COMMERCIAL

## 2022-05-24 NOTE — TELEPHONE ENCOUNTER
I spoke with patient. I told her Dr. Barton does not treat back pain. Patient is going to her primary care doctor to have a work up done and a referral to a spine doctor.

## 2022-05-25 LAB
H PYLORI AG STL QL IA: NOT DETECTED
SPECIMEN SOURCE: 0

## 2022-05-26 ENCOUNTER — HOSPITAL ENCOUNTER (OUTPATIENT)
Dept: RADIOLOGY | Facility: HOSPITAL | Age: 27
Discharge: HOME | End: 2022-05-26
Attending: FAMILY MEDICINE
Payer: COMMERCIAL

## 2022-05-26 ENCOUNTER — PATIENT MESSAGE (OUTPATIENT)
Dept: FAMILY MEDICINE | Facility: CLINIC | Age: 27
End: 2022-05-26
Payer: COMMERCIAL

## 2022-05-26 DIAGNOSIS — R10.11 RUQ ABDOMINAL PAIN: Primary | ICD-10-CM

## 2022-05-26 DIAGNOSIS — R10.11 RUQ ABDOMINAL PAIN: ICD-10-CM

## 2022-05-26 PROCEDURE — 76700 US EXAM ABDOM COMPLETE: CPT | Mod: TC

## 2022-05-26 PROCEDURE — 76700 US ABDOMEN COMPLETE: ICD-10-PCS | Mod: 26,,, | Performed by: RADIOLOGY

## 2022-05-26 PROCEDURE — 76700 US EXAM ABDOM COMPLETE: CPT | Mod: 26,,, | Performed by: RADIOLOGY

## 2022-05-31 ENCOUNTER — PATIENT MESSAGE (OUTPATIENT)
Dept: FAMILY MEDICINE | Facility: CLINIC | Age: 27
End: 2022-05-31
Payer: COMMERCIAL

## 2022-06-13 ENCOUNTER — OFFICE VISIT (OUTPATIENT)
Dept: GASTROENTEROLOGY | Facility: CLINIC | Age: 27
End: 2022-06-13
Payer: COMMERCIAL

## 2022-06-13 ENCOUNTER — LAB VISIT (OUTPATIENT)
Dept: LAB | Facility: HOSPITAL | Age: 27
End: 2022-06-13
Attending: INTERNAL MEDICINE
Payer: COMMERCIAL

## 2022-06-13 VITALS
HEART RATE: 83 BPM | OXYGEN SATURATION: 98 % | DIASTOLIC BLOOD PRESSURE: 80 MMHG | HEIGHT: 67 IN | BODY MASS INDEX: 31.47 KG/M2 | SYSTOLIC BLOOD PRESSURE: 120 MMHG | WEIGHT: 200.5 LBS

## 2022-06-13 DIAGNOSIS — R11.0 NAUSEA: ICD-10-CM

## 2022-06-13 DIAGNOSIS — E65 OBESE ABDOMEN: ICD-10-CM

## 2022-06-13 DIAGNOSIS — D50.9 IRON DEFICIENCY ANEMIA, UNSPECIFIED IRON DEFICIENCY ANEMIA TYPE: ICD-10-CM

## 2022-06-13 DIAGNOSIS — K21.9 GASTROESOPHAGEAL REFLUX DISEASE, UNSPECIFIED WHETHER ESOPHAGITIS PRESENT: Primary | ICD-10-CM

## 2022-06-13 DIAGNOSIS — D50.9 IRON DEFICIENCY ANEMIA, UNSPECIFIED IRON DEFICIENCY ANEMIA TYPE: Primary | ICD-10-CM

## 2022-06-13 DIAGNOSIS — K21.9 GASTROESOPHAGEAL REFLUX DISEASE, UNSPECIFIED WHETHER ESOPHAGITIS PRESENT: ICD-10-CM

## 2022-06-13 DIAGNOSIS — E61.1 IRON DEFICIENCY: Primary | ICD-10-CM

## 2022-06-13 DIAGNOSIS — K21.9 GERD (GASTROESOPHAGEAL REFLUX DISEASE): ICD-10-CM

## 2022-06-13 DIAGNOSIS — R10.10 UPPER ABDOMINAL PAIN, UNSPECIFIED: ICD-10-CM

## 2022-06-13 LAB
IRON SERPL-MCNC: 23 UG/DL (ref 30–160)
SATURATED IRON: 5 % (ref 20–50)
TOTAL IRON BINDING CAPACITY: 482 UG/DL (ref 250–450)
TRANSFERRIN SERPL-MCNC: 326 MG/DL (ref 200–375)

## 2022-06-13 PROCEDURE — 99204 PR OFFICE/OUTPT VISIT, NEW, LEVL IV, 45-59 MIN: ICD-10-PCS | Mod: S$GLB,,, | Performed by: INTERNAL MEDICINE

## 2022-06-13 PROCEDURE — 1159F MED LIST DOCD IN RCRD: CPT | Mod: S$GLB,,, | Performed by: INTERNAL MEDICINE

## 2022-06-13 PROCEDURE — 3074F SYST BP LT 130 MM HG: CPT | Mod: S$GLB,,, | Performed by: INTERNAL MEDICINE

## 2022-06-13 PROCEDURE — 3008F PR BODY MASS INDEX (BMI) DOCUMENTED: ICD-10-PCS | Mod: S$GLB,,, | Performed by: INTERNAL MEDICINE

## 2022-06-13 PROCEDURE — 99999 PR PBB SHADOW E&M-EST. PATIENT-LVL IV: CPT | Mod: PBBFAC,,, | Performed by: INTERNAL MEDICINE

## 2022-06-13 PROCEDURE — 3074F PR MOST RECENT SYSTOLIC BLOOD PRESSURE < 130 MM HG: ICD-10-PCS | Mod: S$GLB,,, | Performed by: INTERNAL MEDICINE

## 2022-06-13 PROCEDURE — 3079F DIAST BP 80-89 MM HG: CPT | Mod: S$GLB,,, | Performed by: INTERNAL MEDICINE

## 2022-06-13 PROCEDURE — 1160F PR REVIEW ALL MEDS BY PRESCRIBER/CLIN PHARMACIST DOCUMENTED: ICD-10-PCS | Mod: S$GLB,,, | Performed by: INTERNAL MEDICINE

## 2022-06-13 PROCEDURE — 99204 OFFICE O/P NEW MOD 45 MIN: CPT | Mod: S$GLB,,, | Performed by: INTERNAL MEDICINE

## 2022-06-13 PROCEDURE — 3008F BODY MASS INDEX DOCD: CPT | Mod: S$GLB,,, | Performed by: INTERNAL MEDICINE

## 2022-06-13 PROCEDURE — 3044F PR MOST RECENT HEMOGLOBIN A1C LEVEL <7.0%: ICD-10-PCS | Mod: S$GLB,,, | Performed by: INTERNAL MEDICINE

## 2022-06-13 PROCEDURE — 36415 COLL VENOUS BLD VENIPUNCTURE: CPT | Performed by: INTERNAL MEDICINE

## 2022-06-13 PROCEDURE — 1159F PR MEDICATION LIST DOCUMENTED IN MEDICAL RECORD: ICD-10-PCS | Mod: S$GLB,,, | Performed by: INTERNAL MEDICINE

## 2022-06-13 PROCEDURE — 99999 PR PBB SHADOW E&M-EST. PATIENT-LVL IV: ICD-10-PCS | Mod: PBBFAC,,, | Performed by: INTERNAL MEDICINE

## 2022-06-13 PROCEDURE — 3044F HG A1C LEVEL LT 7.0%: CPT | Mod: S$GLB,,, | Performed by: INTERNAL MEDICINE

## 2022-06-13 PROCEDURE — 3079F PR MOST RECENT DIASTOLIC BLOOD PRESSURE 80-89 MM HG: ICD-10-PCS | Mod: S$GLB,,, | Performed by: INTERNAL MEDICINE

## 2022-06-13 PROCEDURE — 1160F RVW MEDS BY RX/DR IN RCRD: CPT | Mod: S$GLB,,, | Performed by: INTERNAL MEDICINE

## 2022-06-13 PROCEDURE — 84466 ASSAY OF TRANSFERRIN: CPT | Performed by: INTERNAL MEDICINE

## 2022-06-13 RX ORDER — SODIUM CHLORIDE 0.9 % (FLUSH) 0.9 %
10 SYRINGE (ML) INJECTION
Status: CANCELLED | OUTPATIENT
Start: 2022-06-13

## 2022-06-13 NOTE — TELEPHONE ENCOUNTER
Attempted to contact Gustavo Perry to discuss results.    Left voice mail to return our call at [phone] on [phone] (home).    Alyson Gomez

## 2022-06-13 NOTE — H&P (VIEW-ONLY)
"Subjective:       Patient ID: Gustavo Perry is a 27 y.o. female.    Chief Complaint: Abdominal Pain (Sharp pains "by gallbladder") and Other (Nausea before & after eating)    For at least 2 months she has experienced nausea.  She has had pyrosis.  She has tried to decrease her oral intake because she does not want to vomit.  She does not associate her symptoms with a specific food.  She denies a precipitating factor.  The ultrasound shows a contracted gallbladder but the CT scan was normal.  Her mother recently had a cholecystectomy.  An uncle and grandfather had Pineda's disease, esophageal stomach and liver cancer.  She denies dysphagia aspiration.  She has 4 children with her last delivery year ago.  Her children are living and well.  She appears to have iron deficiency with a decreased MCV. She has menorrhagia and has not followed up with her gynecologist but is going to do so.  She denies hematemesis hematochezia jaundice or bleeding.  She has complained of nonspecific right upper quadrant epigastric discomfort which she is difficult to describe but associated with nausea.      Allergies:  Review of patient's allergies indicates:   Allergen Reactions    Sulfa (sulfonamide antibiotics) Hives       Medications:    Current Outpatient Medications:     ibuprofen (ADVIL,MOTRIN) 200 MG tablet, Take 200 mg by mouth every 6 (six) hours as needed for Pain., Disp: , Rfl:     ondansetron (ZOFRAN-ODT) 4 MG TbDL, Take 1 tablet (4 mg total) by mouth every 6 (six) hours as needed (nausea or vomiting). (Patient not taking: Reported on 2022), Disp: 30 tablet, Rfl: 1    Past Medical History:   Diagnosis Date    Anxiety     Bipolar 1 disorder, depressed     Depression     PTSD (post-traumatic stress disorder)        Past Surgical History:   Procedure Laterality Date     SECTION      TONSILLECTOMY, ADENOIDECTOMY      TUBAL LIGATION      WISDOM TOOTH EXTRACTION           Review of Systems "   Constitutional: Negative for appetite change, fever and unexpected weight change.   HENT: Negative for trouble swallowing.         No jaundice.   Respiratory: Negative for cough, shortness of breath and wheezing.         She is a daily cigarette smoker.  She has been offered the smoking cessation program in the past.  She denies alcohol consumption.  She denies dysphagia aspiration hemoptysis chronic cough chronic sputum production or dyspnea on exertion.   Cardiovascular: Negative for chest pain.        She denies exertional chest pain or rhythm disturbance.   Gastrointestinal: Positive for abdominal pain and nausea. Negative for abdominal distention, anal bleeding, blood in stool, constipation, diarrhea and rectal pain.          CT Abdomen Pelvis  Without Contrast    Status: Final result    MyChart Results Release    [employer] Status: Active  Results Release      PACS Images for [facility] [place] Viewer     Show images for CT Abdomen Pelvis Without Contrast    CT Abdomen Pelvis  Without Contrast  Order: [id number]  Status: Final result      Visible to patient: Yes (seen)      Next appt: 06/23/2022 at 08:45 AM in General Surgery (Eric Huston MD)      0 Result Notes      Details      Reading Physician Reading Date Result Priority  Deangelo Torres MD  [phone] 5/16/2022 STAT    Narrative & Impression  EXAMINATION:  CT ABDOMEN PELVIS WITHOUT CONTRAST     CLINICAL HISTORY:  Abdominal pain, acute, nonlocalized;     TECHNIQUE:  Low dose axial images, sagittal and coronal reformations were obtained from the lung bases to the pubic symphysis.  Oral contrast was not administered.     COMPARISON:  CT 06/28/2017.     FINDINGS:  The liver and spleen are normal in size and attenuation.  The gallbladder, pancreas and adrenal glands are unremarkable.     Kidneys are normal in size and attenuation.  No renal calculi.  No changes of hydronephrosis.  No perinephric inflammatory change.     Air and stool throughout the  loops of colon.  No mesenteric inflammatory change.  No CT evidence for bowel obstruction.  Appendix is normal in caliber.     The bladder is decompressed.  Uterus and ovaries are normal in size and attenuation.  No free fluid in cul-de-sac.     No significant mesenteric or retroperitoneal lymphadenopathy.  There is a small fat containing umbilical hernia.     Impression:     1. No renal calculi.  2. Small fat containing umbilical hernia.        Electronically signed by: Deangelo Torres  Date:                                            05/16/2022  Time:                                           13:56            Exam Ended: 05/16/22 13:35 Last Resulted: 05/16/22 13:56      Order Details       View Encounter       Lab and Collection Details       Routing       Result History            Result Care Coordination        Patient Communication       Add Comments   Seen Back to Top          Other Results from 5/16/2022       Contains abnormal data Comprehensive Metabolic Panel (CMP)  Order: [id number]  Status: Final result      Visible to patient: Yes (seen)      Next appt: 06/23/2022 at 08:45 AM in General Surgery (Eric Huston MD)      0 Result Notes      Component Ref Range & Units 4 wk ago 2 mo ago 3 mo ago 1 yr ago  Sodium 136 - 145 mmol/L 140  140  137  135 Low    Potassium 3.5 - 5.1 mmol/L 3.7  4.1  3.2 Low   3.5   Chloride 95 - 110 mmol/L 108  107  104  107   CO2 23 - 29 mmol/L 25  23  24  19 Low    Glucose 70 - 110 mg/dL 85  86  106  96   BUN 6 - 20 mg/dL 9  12  10  8   Creatinine 0.5 - 1.4 mg/dL 0.9  0.9  0.9  0.6   Calcium 8.7 - 10.5 mg/dL 9.1  9.3  9.1  9.0   Total Protein 6.0 - 8.4 g/dL 7.1  7.5  7.4  6.7   Albumin 3.5 - 5.2 g/dL 3.8  3.8  4.2  3.5   Total Bilirubin 0.1 - 1.0 mg/dL 0.2  0.2 CM  0.4 CM  0.1 CM   Comment: For infants and newborns, interpretation of results should be based   on gestational age, weight and in agreement with clinical   observations.     Premature Infant recommended reference  ranges:   Up to 24 hours.............<8.0 mg/dL   Up to 48 hours............<12.0 mg/dL   3-5 days..................<15.0 mg/dL   6-29 days.................<15.0 mg/dL   Alkaline Phosphatase 55 - 135 U/L 77  73  75  59   AST 10 - 40 U/L 16  12  14  12   ALT 10 - 44 U/L 18  12  16  12   Anion Gap 8 - 16 mmol/L 7 Low   10  9  9   eGFR if African American >60 mL/min/1.73 m^2 >60.0  >60.0  >60.0  >60.0   eGFR if non African American >60 mL/min/1.73 m^2 >60.0  >60.0 CM  >60.0 CM  >60.0 CM   Comment: Calculation used to obtain the estimated glomerular filtration   rate (eGFR) is the CKD-EPI equation.   Resulting Agency  [facility]            Specimen Collected: 05/16/22 13:32 Last Resulted: 05/16/22 14:21       Lab Flowsheet       Order Details       View Encounter       Lab and Collection Details       Routing       Result History        CM=Additional comments        Result Care Coordination        Patient Communication       Add Comments   Seen Back to Top             Contains abnormal data Complete Blood Count (CBC)  Order: [id number]  Status: Final result      Visible to patient: Yes (seen)      Next appt: 06/23/2022 at 08:45 AM in General Surgery (Eric Huston MD)      0 Result Notes      Component Ref Range & Units 4 wk ago   (5/16/22) 2 mo ago   (4/1/22) 3 mo ago   (3/15/22) 1 yr ago   (12/23/20) 1 yr ago   (12/22/20) 1 yr ago   (6/25/20)  WBC 3.90 - 12.70 K/uL 6.46  5.57  7.01  8.66  8.31  7.10   RBC 4.00 - 5.40 M/uL 4.52  4.87  4.69  3.30 Low   4.07  4.20   Hemoglobin 12.0 - 16.0 g/dL 11.9 Low   12.8  12.4  9.4 Low   11.3 Low   12.5   Hematocrit 37.0 - 48.5 % 36.0 Low   40.1  37.5  29.2 Low   35.3 Low   35.8 Low    MCV 82 - 98 fL 80 Low   82  80 Low   89  87  85   MCH 27.0 - 31.0 pg 26.3 Low   26.3 Low   26.4 Low   28.5  27.8  29.8   MCHC 32.0 - 36.0 g/dL 33.1  31.9 Low   33.1  32.2  32.0  34.9   RDW 11.5 - 14.5 % 14.9 High   14.5  14.9 High   13.3  13.4  12.7   Platelets 150 -  450 K/uL 240  278  245  223 R  255 R  195 R   MPV 9.2 - 12.9 fL 10.8  10.1  10.6  11.0  11.0  10.3   Immature Granulocytes 0.0 - 0.5 % 0.3  0.2  0.3  0.3  0.2  0.3   Gran # (ANC) 1.8 - 7.7 K/uL 3.1  2.6  4.9  6.0  5.2  4.1   Immature Grans (Abs) 0.00 - 0.04 K/uL 0.02  0.01 CM  0.02 CM  0.03 CM  0.02 CM  0.02 CM   Comment: Mild elevation in immature granulocytes is non specific and   can be seen in a variety of conditions including stress response,   acute inflammation, trauma and pregnancy. Correlation with other   laboratory and clinical findings is essential.   Lymph # 1.0 - 4.8 K/uL 3.0  2.5  1.5  2.1  2.6  2.5   Mono # 0.3 - 1.0 K/uL 0.3  0.3  0.4  0.5  0.5  0.4   Eos # 0.0 - 0.5 K/uL 0.1  0.2  0.1  0.1  0.1  0.1   Baso # 0.00 - 0.20 K/uL 0.03  0.03  0.05  0.01  0.03  0.02   nRBC 0 /100 WBC 0  0  0  0  0  0   Gran % 38.0 - 73.0 % 47.7  46.3  69.1  69.0  62.2  57.3   Lymph % 18.0 - 48.0 % 46.6  44.9  22.0  23.8  30.7  35.8   Mono % 4.0 - 15.0 % 4.0  5.2  6.3  6.2  5.8  5.5   Eosinophil % 0.0 - 8.0 % 0.9  2.9  1.6  0.6  0.7  0.8   Basophil % 0.0 - 1.9 % 0.5  0.5  0.7  0.1  0.4  0.3   Differential Method  Automated  Automated  Automated  Automated  Automated  Automated   Resulting Agency  [facility]            Specimen Collected: 05/16/22 13:32 Last Resulted: 05/16/22 13:45       Lab Flowsheet       Order Details       View Encounter       Lab and Collection Details       Routing       Result History        CM=Additional comments  R=Reference range differs from displayed range        Result Care Coordination        Patient Communication       Add Comments   Seen Back to Top             Contains abnormal data Urinalysis, Reflex to Urine Culture Urine, Clean Catch  Order: [id number]  Status: Final result      Visible to patient: Yes (seen)      Next appt: 06/23/2022 at 08:45 AM in General Surgery (Eric Huston MD)      Specimen Information: Urine, Clean Catch      0 Result  Notes      Component Ref Range & Units 4 wk ago   (5/16/22) 6 mo ago   (11/30/21) 1 yr ago   (12/22/20) 2 yr ago   (4/17/20) 2 yr ago   (11/13/19)  Specimen UA  Urine, Unspecified  Urine, Catheterized  Urine, Clean Catch  Urine, Clean Catch  Urine, Clean Catch   Color, UA Yellow, Straw, [place] Yellow  Yellow  Yellow  Yellow  Yellow   Appearance, UA Clear Clear  Clear  Hazy Abnormal   Clear  Clear   pH, UA 5.0 - 8.0 6.0  6.0  7.0  6.0  5.0   Specific Gravity, UA 1.005 - 1.030 1.025  >=1.030 Abnormal   1.025  1.015  1.025   Protein, UA Negative Negative  Negative CM  Trace Abnormal  CM  Negative CM  Trace Abnormal  CM   Comment: Recommend a 24 hour urine protein or a urine   protein/creatinine ratio if globulin induced proteinuria is   clinically suspected.   Glucose, UA Negative Negative  Negative  Negative  Negative  Negative   Ketones, UA Negative Negative  Negative  Negative  Negative  Negative   Bilirubin (UA) Negative Negative  Negative  Negative  Negative  Negative   Occult Blood UA Negative Trace Abnormal   Trace Abnormal   Negative  Negative  3+ Abnormal    Nitrite, UA Negative Negative  Negative  Negative  Negative  Negative   Urobilinogen, UA Negative EU/dL Negative  Negative  Negative  Negative  Negative   Leukocytes, UA Negative Negative  Negative  Trace Abnormal   Trace Abnormal   Negative   Resulting Agency  [facility]            Narrative  Performed by: [facility]    Preferred Collection Type->Urine, Clean Catch   Specimen Source->Urine    Specimen Collected: 05/16/22 12:49 Last Resulted: 05/16/22 13:11       Lab Flowsheet       Order Details       View Encounter       Lab and Collection Details       Routing       Result History        CM=Additional comments        Result Care Coordination        Patient Communication       Add Comments   Seen Back to Top            Pregnancy, urine rapid  Order: [id number]  Status: Final result      Visible to patient: Yes (seen)       Next appt: 06/23/2022 at 08:45 AM in General Surgery (Eric Huston MD)      0 Result Notes      Component 4 wk ago   Preg Test, Ur Negative   Resulting Agency [facility]            Narrative  Performed by: [facility]    Specimen Source->Urine    Specimen Collected: 05/16/22 12:49 Last Resulted: 05/16/22 13:11       Lab Flowsheet       Order Details       View Encounter       Lab and Collection Details       Routing       Result History            Result Care Coordination        Patient Communication       Add Comments   Seen Back to Top            CT Abdomen Pelvis  Without Contrast: Patient Communication     Add Comments   Seen      External Result Report    External Result Report    Narrative & Impression      EXAMINATION:  CT ABDOMEN PELVIS WITHOUT CONTRAST     CLINICAL HISTORY:  Abdominal pain, acute, nonlocalized;     TECHNIQUE:  Low dose axial images, sagittal and coronal reformations were obtained from the lung bases to the pubic symphysis.  Oral contrast was not administered.     COMPARISON:  CT 06/28/2017.     FINDINGS:  The liver and spleen are normal in size and attenuation.  The gallbladder, pancreas and adrenal glands are unremarkable.     Kidneys are normal in size and attenuation.  No renal calculi.  No changes of hydronephrosis.  No perinephric inflammatory change.     Air and stool throughout the loops of colon.  No mesenteric inflammatory change.  No CT evidence for bowel obstruction.  Appendix is normal in caliber.     The bladder is decompressed.  Uterus and ovaries are normal in size and attenuation.  No free fluid in cul-de-sac.     No significant mesenteric or retroperitoneal lymphadenopathy.  There is a small fat containing umbilical hernia.     Impression:     1. No renal calculi.  2. Small fat containing umbilical hernia.        Electronically signed by: Deangelo Torres  Date:                                            05/16/2022  Time:                                            13:56       Encounter      View Encounter           Signed by    Signed Time Phone Pager  Deangelo Torres MD 5/16/2022 13:56 [phone]     Exam Details    Performed Procedure Technologist Supporting Staff Performing Physician  CT Abdomen Pelvis  Without Contrast Albert Carranza, RT         Appointment Date/Status Modality Department   5/16/2022     Completed [facility] CT1 [facility] CT SCAN        Begin Exam End Exam Begin Exam Questionnaires End Exam Questionnaires  5/16/2022  1:35 PM 5/16/2022  1:35 PM [facility] PREGNANCY TECH NAVIGATOR IMAGING END ALL         Reason for Exam  Priority: STAT  Abdominal pain, acute, nonlocalized    Order Report     Order Details      US Abdomen Complete    Status: Final result        MyChart Results Release    MyChart Status: Active  Results Release        US Abdomen Complete: Result Notes     Wale James MD   5/31/2022  8:25 AM CDT       Discussed via portal on 5/31/22. Referred to surgery           PACS Images for [facility] [place] Viewer     Show images for US Abdomen Complete                All Reviewers List    Wale James MD on 5/31/2022 08:25      US Abdomen Complete  Order: [id number]  Status: Final result    Visible to patient: Yes (seen)    Next appt: 06/23/2022 at 08:45 AM in General Surgery (Eric Huston MD)    Dx: RUQ abdominal pain    1 Result Note    Details      Reading Physician Reading Date Result Priority  Minor Millan Jr., MD  [phone] 5/26/2022 Routine    Narrative & Impression  EXAMINATION:  US ABDOMEN COMPLETE     CLINICAL HISTORY:  Right upper quadrant pain     TECHNIQUE:  Complete abdominal ultrasound (including pancreas, aorta, liver, gallbladder, common bile duct, IVC, kidneys, and spleen) was performed.     COMPARISON:  None     FINDINGS:  Pancreas: The visualized portions of pancreas appear normal.     Aorta: No aneurysm.     Liver: 15.5 cm, normal in size. Homogeneous parenchymal echotexture. No focal lesions.     Gallbladder:  Significantly contracted.  Shadowing stone is not identified but the imaging is suboptimal.  Negative sonographic Xie's sign.     Biliary system: 2.3 mm common bile duct.  No intrahepatic ductal dilatation.     Inferior vena cava: Normal in appearance.     Right kidney: 11.7 cm. No hydronephrosis.     Left kidney: 11.9 cm. Nine     Spleen: 9.7 cm.  Normal in size with homogeneous echotexture.     Miscellaneous: No ascites.     Impression:     Suboptimal imaging of the gallbladder due to significant contraction.  Otherwise negative abdomen ultrasound        Electronically signed by: Minor Millan MD  Date:                                            05/26/2022  Time:                                           11:13          Exam Ended: 05/26/22 09:48 Last Resulted: 05/26/22 11:13  Ryan as an Unsuccessful Attempt     Order Details    View Encounter    Lab and Collection Details    Routing    Result History          Result Care Coordination      Result Notes       Wale James MD   5/31/2022  8:25 AM CDT Back to Top      Discussed via portal on 5/31/22. Referred to surgery    Patient Communication      Add Comments  Seen Back to Top                US Abdomen Complete: Patient Communication    Add Comments  Seen      External Result Report    External Result Report      Narrative & Impression      EXAMINATION:  US ABDOMEN COMPLETE     CLINICAL HISTORY:  Right upper quadrant pain     TECHNIQUE:  Complete abdominal ultrasound (including pancreas, aorta, liver, gallbladder, common bile duct, IVC, kidneys, and spleen) was performed.     COMPARISON:  None     FINDINGS:  Pancreas: The visualized portions of pancreas appear normal.     Aorta: No aneurysm.     Liver: 15.5 cm, normal in size. Homogeneous parenchymal echotexture. No focal lesions.     Gallbladder: Significantly contracted.  Shadowing stone is not identified but the imaging is suboptimal.  Negative sonographic Xie's sign.     Biliary system: 2.3 mm  common bile duct.  No intrahepatic ductal dilatation.     Inferior vena cava: Normal in appearance.     Right kidney: 11.7 cm. No hydronephrosis.     Left kidney: 11.9 cm. Nine     Spleen: 9.7 cm.  Normal in size with homogeneous echotexture.     Miscellaneous: No ascites.     Impression:     Suboptimal imaging of the gallbladder due to significant contraction.  Otherwise negative abdomen ultrasound        Electronically signed by: Minor Millan MD  Date:                                            05/26/2022  Time:                                           11:13     Encounter      View Encounter               Signed by    Signed Time Phone Pager  Minor Millan Jr., MD 5/26/2022 11:13 [phone]     Reviewed By    Wale James MD on 5/31/2022 08:25          Exam Details    Performed Procedure Technologist Supporting Staff Performing Physician  US Abdomen Complete Maria Elena Rebollar RDMS         Appointment Date/Status Modality Department   5/26/2022     Completed [facility] US 2 [facility] ULTRASOUND        Begin Exam End Exam Begin Exam Questionnaires End Exam Questionnaires  5/26/2022  9:11 AM 5/26/2022  9:48 AM RIS PREGNANCY TECH NAVIGATOR IMAGING END ALL           Reason for Exam  Priority: Routine  Dx: RUQ abdominal pain (R10.11 (ICD-10-CM))        Order Report    Order Details             Musculoskeletal: Negative for back pain and neck pain.   Skin: Negative for pallor and rash.   Neurological: Negative for dizziness, seizures, syncope, speech difficulty, weakness and numbness.   Hematological: Negative for adenopathy.   Psychiatric/Behavioral: Negative for confusion.       Objective:      Physical Exam  Vitals reviewed.   Constitutional:       Appearance: She is well-developed.      Comments: Well-nourished well-hydrated overweight afebrile nonicteric white female.  She is sitting comfortably in the chair in breathing normally.  She appears to be oriented x3 and can relate her history and answer questions  appropriately.  She is normocephalic.  Pupils are normal.   HENT:      Head: Normocephalic.   Eyes:      Pupils: Pupils are equal, round, and reactive to light.   Neck:      Thyroid: No thyromegaly.      Trachea: No tracheal deviation.   Cardiovascular:      Rate and Rhythm: Normal rate and regular rhythm.      Heart sounds: Normal heart sounds.   Pulmonary:      Effort: Pulmonary effort is normal.      Breath sounds: Normal breath sounds.   Abdominal:      General: Bowel sounds are normal. There is no distension.      Palpations: Abdomen is soft. There is no mass.      Tenderness: There is abdominal tenderness. There is no right CVA tenderness, left CVA tenderness, guarding or rebound.      Hernia: No hernia is present.      Comments: The abdomen is soft is mildly obese with mild tenderness to palpation in the epigastrium right upper quadrant.  Organomegaly or masses are not detected.  Bowel sounds are normal.   Musculoskeletal:         General: Normal range of motion.      Cervical back: Normal range of motion and neck supple.      Comments: She can ambulate normally.  She can go from the sitting to the standing position without difficulty.  She can get on the exam table without difficulty or assistance.   Lymphadenopathy:      Cervical: No cervical adenopathy.   Skin:     General: Skin is warm and dry.   Neurological:      Mental Status: She is alert and oriented to person, place, and time.      Cranial Nerves: No cranial nerve deficit.   Psychiatric:         Behavior: Behavior normal.           Plan:       Iron deficiency anemia, unspecified iron deficiency anemia type  -     Iron and TIBC; Future; Expected date: 06/13/2022    Nausea  -     Ambulatory referral/consult to Gastroenterology    Upper abdominal pain, unspecified  -     NM Hepatobiliary Scan W Pharm Intervention; Future; Expected date: 06/13/2022    Gastroesophageal reflux disease, unspecified whether esophagitis present    Obese abdomen    She will  continue her reflux regimen current medications vitamins and minerals.  Evaluation of her anemia is in progress.  A biliary scan and is scheduled.  She will be scheduled for upper endoscopy.  She has good health knowledge and she understands the procedure.  Specifically she realizes there is an extremely small incidence of bleeding perforation or aspiration.  She follows up with her gynecologist for evaluation of the menorrhagia.

## 2022-06-13 NOTE — PATIENT INSTRUCTIONS
She will continue her nutritious diet.  Upper endoscopy, biliary scan and evaluation by her gynecologist for menorrhagia is in progress.  She has iron deficiency.

## 2022-06-13 NOTE — TELEPHONE ENCOUNTER
Call placed to pt to inform of recent results per Dr. Manning advisement.  Pt verbalized understanding and has no further questions at this time.  Instructed pt to return call with any further questions or concerns.

## 2022-06-13 NOTE — TELEPHONE ENCOUNTER
----- Message from Suha Dickens MA sent at 6/13/2022  3:50 PM CDT -----  Type:  Patient Returning Call    Who Called:  pt  Who Left Message for Patient:  Alyson  Does the patient know what this is regarding?:  yes  Best Call Back Number:  [phone] (home)     Additional Information:  please advise--thank you

## 2022-06-13 NOTE — TELEPHONE ENCOUNTER
----- Message from Andrea Manning MD sent at 6/13/2022  2:11 PM CDT -----  She has iron deficiency.  Start on the ICAR-C daily

## 2022-06-13 NOTE — PROGRESS NOTES
"Subjective:       Patient ID: Gustavo Perry is a 27 y.o. female.    Chief Complaint: Abdominal Pain (Sharp pains "by gallbladder") and Other (Nausea before & after eating)    For at least 2 months she has experienced nausea.  She has had pyrosis.  She has tried to decrease her oral intake because she does not want to vomit.  She does not associate her symptoms with a specific food.  She denies a precipitating factor.  The ultrasound shows a contracted gallbladder but the CT scan was normal.  Her mother recently had a cholecystectomy.  An uncle and grandfather had Pineda's disease, esophageal stomach and liver cancer.  She denies dysphagia aspiration.  She has 4 children with her last delivery year ago.  Her children are living and well.  She appears to have iron deficiency with a decreased MCV. She has menorrhagia and has not followed up with her gynecologist but is going to do so.  She denies hematemesis hematochezia jaundice or bleeding.  She has complained of nonspecific right upper quadrant epigastric discomfort which she is difficult to describe but associated with nausea.      Allergies:  Review of patient's allergies indicates:   Allergen Reactions    Sulfa (sulfonamide antibiotics) Hives       Medications:    Current Outpatient Medications:     ibuprofen (ADVIL,MOTRIN) 200 MG tablet, Take 200 mg by mouth every 6 (six) hours as needed for Pain., Disp: , Rfl:     ondansetron (ZOFRAN-ODT) 4 MG TbDL, Take 1 tablet (4 mg total) by mouth every 6 (six) hours as needed (nausea or vomiting). (Patient not taking: Reported on 2022), Disp: 30 tablet, Rfl: 1    Past Medical History:   Diagnosis Date    Anxiety     Bipolar 1 disorder, depressed     Depression     PTSD (post-traumatic stress disorder)        Past Surgical History:   Procedure Laterality Date     SECTION      TONSILLECTOMY, ADENOIDECTOMY      TUBAL LIGATION      WISDOM TOOTH EXTRACTION           Review of Systems "   Constitutional: Negative for appetite change, fever and unexpected weight change.   HENT: Negative for trouble swallowing.         No jaundice.   Respiratory: Negative for cough, shortness of breath and wheezing.         She is a daily cigarette smoker.  She has been offered the smoking cessation program in the past.  She denies alcohol consumption.  She denies dysphagia aspiration hemoptysis chronic cough chronic sputum production or dyspnea on exertion.   Cardiovascular: Negative for chest pain.        She denies exertional chest pain or rhythm disturbance.   Gastrointestinal: Positive for abdominal pain and nausea. Negative for abdominal distention, anal bleeding, blood in stool, constipation, diarrhea and rectal pain.          CT Abdomen Pelvis  Without Contrast    Status: Final result    MyChart Results Release    Aunt Berthat Status: Active  Results Release      PACS Images for [facility] [place] Viewer     Show images for CT Abdomen Pelvis Without Contrast    CT Abdomen Pelvis  Without Contrast  Order: [id number]  Status: Final result      Visible to patient: Yes (seen)      Next appt: 06/23/2022 at 08:45 AM in General Surgery (Eric Huston MD)      0 Result Notes      Details      Reading Physician Reading Date Result Priority  Deangelo Torres MD  [phone] 5/16/2022 STAT    Narrative & Impression  EXAMINATION:  CT ABDOMEN PELVIS WITHOUT CONTRAST     CLINICAL HISTORY:  Abdominal pain, acute, nonlocalized;     TECHNIQUE:  Low dose axial images, sagittal and coronal reformations were obtained from the lung bases to the pubic symphysis.  Oral contrast was not administered.     COMPARISON:  CT 06/28/2017.     FINDINGS:  The liver and spleen are normal in size and attenuation.  The gallbladder, pancreas and adrenal glands are unremarkable.     Kidneys are normal in size and attenuation.  No renal calculi.  No changes of hydronephrosis.  No perinephric inflammatory change.     Air and stool throughout the  loops of colon.  No mesenteric inflammatory change.  No CT evidence for bowel obstruction.  Appendix is normal in caliber.     The bladder is decompressed.  Uterus and ovaries are normal in size and attenuation.  No free fluid in cul-de-sac.     No significant mesenteric or retroperitoneal lymphadenopathy.  There is a small fat containing umbilical hernia.     Impression:     1. No renal calculi.  2. Small fat containing umbilical hernia.        Electronically signed by: Deangelo Torres  Date:                                            05/16/2022  Time:                                           13:56            Exam Ended: 05/16/22 13:35 Last Resulted: 05/16/22 13:56      Order Details       View Encounter       Lab and Collection Details       Routing       Result History            Result Care Coordination        Patient Communication       Add Comments   Seen Back to Top          Other Results from 5/16/2022       Contains abnormal data Comprehensive Metabolic Panel (CMP)  Order: [id number]  Status: Final result      Visible to patient: Yes (seen)      Next appt: 06/23/2022 at 08:45 AM in General Surgery (Eric Huston MD)      0 Result Notes      Component Ref Range & Units 4 wk ago 2 mo ago 3 mo ago 1 yr ago  Sodium 136 - 145 mmol/L 140  140  137  135 Low    Potassium 3.5 - 5.1 mmol/L 3.7  4.1  3.2 Low   3.5   Chloride 95 - 110 mmol/L 108  107  104  107   CO2 23 - 29 mmol/L 25  23  24  19 Low    Glucose 70 - 110 mg/dL 85  86  106  96   BUN 6 - 20 mg/dL 9  12  10  8   Creatinine 0.5 - 1.4 mg/dL 0.9  0.9  0.9  0.6   Calcium 8.7 - 10.5 mg/dL 9.1  9.3  9.1  9.0   Total Protein 6.0 - 8.4 g/dL 7.1  7.5  7.4  6.7   Albumin 3.5 - 5.2 g/dL 3.8  3.8  4.2  3.5   Total Bilirubin 0.1 - 1.0 mg/dL 0.2  0.2 CM  0.4 CM  0.1 CM   Comment: For infants and newborns, interpretation of results should be based   on gestational age, weight and in agreement with clinical   observations.     Premature Infant recommended reference  ranges:   Up to 24 hours.............<8.0 mg/dL   Up to 48 hours............<12.0 mg/dL   3-5 days..................<15.0 mg/dL   6-29 days.................<15.0 mg/dL   Alkaline Phosphatase 55 - 135 U/L 77  73  75  59   AST 10 - 40 U/L 16  12  14  12   ALT 10 - 44 U/L 18  12  16  12   Anion Gap 8 - 16 mmol/L 7 Low   10  9  9   eGFR if African American >60 mL/min/1.73 m^2 >60.0  >60.0  >60.0  >60.0   eGFR if non African American >60 mL/min/1.73 m^2 >60.0  >60.0 CM  >60.0 CM  >60.0 CM   Comment: Calculation used to obtain the estimated glomerular filtration   rate (eGFR) is the CKD-EPI equation.   Resulting Agency  [facility]            Specimen Collected: 05/16/22 13:32 Last Resulted: 05/16/22 14:21       Lab Flowsheet       Order Details       View Encounter       Lab and Collection Details       Routing       Result History        CM=Additional comments        Result Care Coordination        Patient Communication       Add Comments   Seen Back to Top             Contains abnormal data Complete Blood Count (CBC)  Order: [id number]  Status: Final result      Visible to patient: Yes (seen)      Next appt: 06/23/2022 at 08:45 AM in General Surgery (Eric Huston MD)      0 Result Notes      Component Ref Range & Units 4 wk ago   (5/16/22) 2 mo ago   (4/1/22) 3 mo ago   (3/15/22) 1 yr ago   (12/23/20) 1 yr ago   (12/22/20) 1 yr ago   (6/25/20)  WBC 3.90 - 12.70 K/uL 6.46  5.57  7.01  8.66  8.31  7.10   RBC 4.00 - 5.40 M/uL 4.52  4.87  4.69  3.30 Low   4.07  4.20   Hemoglobin 12.0 - 16.0 g/dL 11.9 Low   12.8  12.4  9.4 Low   11.3 Low   12.5   Hematocrit 37.0 - 48.5 % 36.0 Low   40.1  37.5  29.2 Low   35.3 Low   35.8 Low    MCV 82 - 98 fL 80 Low   82  80 Low   89  87  85   MCH 27.0 - 31.0 pg 26.3 Low   26.3 Low   26.4 Low   28.5  27.8  29.8   MCHC 32.0 - 36.0 g/dL 33.1  31.9 Low   33.1  32.2  32.0  34.9   RDW 11.5 - 14.5 % 14.9 High   14.5  14.9 High   13.3  13.4  12.7   Platelets 150 -  450 K/uL 240  278  245  223 R  255 R  195 R   MPV 9.2 - 12.9 fL 10.8  10.1  10.6  11.0  11.0  10.3   Immature Granulocytes 0.0 - 0.5 % 0.3  0.2  0.3  0.3  0.2  0.3   Gran # (ANC) 1.8 - 7.7 K/uL 3.1  2.6  4.9  6.0  5.2  4.1   Immature Grans (Abs) 0.00 - 0.04 K/uL 0.02  0.01 CM  0.02 CM  0.03 CM  0.02 CM  0.02 CM   Comment: Mild elevation in immature granulocytes is non specific and   can be seen in a variety of conditions including stress response,   acute inflammation, trauma and pregnancy. Correlation with other   laboratory and clinical findings is essential.   Lymph # 1.0 - 4.8 K/uL 3.0  2.5  1.5  2.1  2.6  2.5   Mono # 0.3 - 1.0 K/uL 0.3  0.3  0.4  0.5  0.5  0.4   Eos # 0.0 - 0.5 K/uL 0.1  0.2  0.1  0.1  0.1  0.1   Baso # 0.00 - 0.20 K/uL 0.03  0.03  0.05  0.01  0.03  0.02   nRBC 0 /100 WBC 0  0  0  0  0  0   Gran % 38.0 - 73.0 % 47.7  46.3  69.1  69.0  62.2  57.3   Lymph % 18.0 - 48.0 % 46.6  44.9  22.0  23.8  30.7  35.8   Mono % 4.0 - 15.0 % 4.0  5.2  6.3  6.2  5.8  5.5   Eosinophil % 0.0 - 8.0 % 0.9  2.9  1.6  0.6  0.7  0.8   Basophil % 0.0 - 1.9 % 0.5  0.5  0.7  0.1  0.4  0.3   Differential Method  Automated  Automated  Automated  Automated  Automated  Automated   Resulting Agency  [facility]            Specimen Collected: 05/16/22 13:32 Last Resulted: 05/16/22 13:45       Lab Flowsheet       Order Details       View Encounter       Lab and Collection Details       Routing       Result History        CM=Additional comments  R=Reference range differs from displayed range        Result Care Coordination        Patient Communication       Add Comments   Seen Back to Top             Contains abnormal data Urinalysis, Reflex to Urine Culture Urine, Clean Catch  Order: [id number]  Status: Final result      Visible to patient: Yes (seen)      Next appt: 06/23/2022 at 08:45 AM in General Surgery (Eric Huston MD)      Specimen Information: Urine, Clean Catch      0 Result  Notes      Component Ref Range & Units 4 wk ago   (5/16/22) 6 mo ago   (11/30/21) 1 yr ago   (12/22/20) 2 yr ago   (4/17/20) 2 yr ago   (11/13/19)  Specimen UA  Urine, Unspecified  Urine, Catheterized  Urine, Clean Catch  Urine, Clean Catch  Urine, Clean Catch   Color, UA Yellow, Straw, Tatiana Yellow  Yellow  Yellow  Yellow  Yellow   Appearance, UA Clear Clear  Clear  Hazy Abnormal   Clear  Clear   pH, UA 5.0 - 8.0 6.0  6.0  7.0  6.0  5.0   Specific Gravity, UA 1.005 - 1.030 1.025  >=1.030 Abnormal   1.025  1.015  1.025   Protein, UA Negative Negative  Negative CM  Trace Abnormal  CM  Negative CM  Trace Abnormal  CM   Comment: Recommend a 24 hour urine protein or a urine   protein/creatinine ratio if globulin induced proteinuria is   clinically suspected.   Glucose, UA Negative Negative  Negative  Negative  Negative  Negative   Ketones, UA Negative Negative  Negative  Negative  Negative  Negative   Bilirubin (UA) Negative Negative  Negative  Negative  Negative  Negative   Occult Blood UA Negative Trace Abnormal   Trace Abnormal   Negative  Negative  3+ Abnormal    Nitrite, UA Negative Negative  Negative  Negative  Negative  Negative   Urobilinogen, UA Negative EU/dL Negative  Negative  Negative  Negative  Negative   Leukocytes, UA Negative Negative  Negative  Trace Abnormal   Trace Abnormal   Negative   Resulting Agency  [facility]            Narrative  Performed by: [facility]    Preferred Collection Type->Urine, Clean Catch   Specimen Source->Urine    Specimen Collected: 05/16/22 12:49 Last Resulted: 05/16/22 13:11       Lab Flowsheet       Order Details       View Encounter       Lab and Collection Details       Routing       Result History        CM=Additional comments        Result Care Coordination        Patient Communication       Add Comments   Seen Back to Top            Pregnancy, urine rapid  Order: [id number]  Status: Final result      Visible to patient: Yes (seen)       Next appt: 06/23/2022 at 08:45 AM in General Surgery (Eric Huston MD)      0 Result Notes      Component 4 wk ago   Preg Test, Ur Negative   Resulting Agency [facility]            Narrative  Performed by: [facility]    Specimen Source->Urine    Specimen Collected: 05/16/22 12:49 Last Resulted: 05/16/22 13:11       Lab Flowsheet       Order Details       View Encounter       Lab and Collection Details       Routing       Result History            Result Care Coordination        Patient Communication       Add Comments   Seen Back to Top            CT Abdomen Pelvis  Without Contrast: Patient Communication     Add Comments   Seen      External Result Report    External Result Report    Narrative & Impression      EXAMINATION:  CT ABDOMEN PELVIS WITHOUT CONTRAST     CLINICAL HISTORY:  Abdominal pain, acute, nonlocalized;     TECHNIQUE:  Low dose axial images, sagittal and coronal reformations were obtained from the lung bases to the pubic symphysis.  Oral contrast was not administered.     COMPARISON:  CT 06/28/2017.     FINDINGS:  The liver and spleen are normal in size and attenuation.  The gallbladder, pancreas and adrenal glands are unremarkable.     Kidneys are normal in size and attenuation.  No renal calculi.  No changes of hydronephrosis.  No perinephric inflammatory change.     Air and stool throughout the loops of colon.  No mesenteric inflammatory change.  No CT evidence for bowel obstruction.  Appendix is normal in caliber.     The bladder is decompressed.  Uterus and ovaries are normal in size and attenuation.  No free fluid in cul-de-sac.     No significant mesenteric or retroperitoneal lymphadenopathy.  There is a small fat containing umbilical hernia.     Impression:     1. No renal calculi.  2. Small fat containing umbilical hernia.        Electronically signed by: Deangelo Torres  Date:                                            05/16/2022  Time:                                            13:56       Encounter      View Encounter           Signed by    Signed Time Phone Pager  Deangelo Torres MD 5/16/2022 13:56 [phone]     Exam Details    Performed Procedure Technologist Supporting Staff Performing Physician  CT Abdomen Pelvis  Without Contrast Albert Carranza, RT         Appointment Date/Status Modality Department   5/16/2022     Completed [facility] CT1 [facility] CT SCAN        Begin Exam End Exam Begin Exam Questionnaires End Exam Questionnaires  5/16/2022  1:35 PM 5/16/2022  1:35 PM RIS PREGNANCY TECH NAVIGATOR IMAGING END ALL         Reason for Exam  Priority: STAT  Abdominal pain, acute, nonlocalized    Order Report     Order Details      US Abdomen Complete    Status: Final result        MyChart Results Release    MyChart Status: Active  Results Release        US Abdomen Complete: Result Notes     Wale James MD   5/31/2022  8:25 AM CDT       Discussed via portal on 5/31/22. Referred to surgery           PACS Images for [facility] [place] Viewer     Show images for US Abdomen Complete                All Reviewers List    Wale James MD on 5/31/2022 08:25      US Abdomen Complete  Order: [id number]  Status: Final result    Visible to patient: Yes (seen)    Next appt: 06/23/2022 at 08:45 AM in General Surgery (Eric Huston MD)    Dx: RUQ abdominal pain    1 Result Note    Details      Reading Physician Reading Date Result Priority  Minor Millan Jr., MD  [phone] 5/26/2022 Routine    Narrative & Impression  EXAMINATION:  US ABDOMEN COMPLETE     CLINICAL HISTORY:  Right upper quadrant pain     TECHNIQUE:  Complete abdominal ultrasound (including pancreas, aorta, liver, gallbladder, common bile duct, IVC, kidneys, and spleen) was performed.     COMPARISON:  None     FINDINGS:  Pancreas: The visualized portions of pancreas appear normal.     Aorta: No aneurysm.     Liver: 15.5 cm, normal in size. Homogeneous parenchymal echotexture. No focal lesions.     Gallbladder:  Significantly contracted.  Shadowing stone is not identified but the imaging is suboptimal.  Negative sonographic Xie's sign.     Biliary system: 2.3 mm common bile duct.  No intrahepatic ductal dilatation.     Inferior vena cava: Normal in appearance.     Right kidney: 11.7 cm. No hydronephrosis.     Left kidney: 11.9 cm. Nine     Spleen: 9.7 cm.  Normal in size with homogeneous echotexture.     Miscellaneous: No ascites.     Impression:     Suboptimal imaging of the gallbladder due to significant contraction.  Otherwise negative abdomen ultrasound        Electronically signed by: Minor Millan MD  Date:                                            05/26/2022  Time:                                           11:13          Exam Ended: 05/26/22 09:48 Last Resulted: 05/26/22 11:13  Ryan as an Unsuccessful Attempt     Order Details    View Encounter    Lab and Collection Details    Routing    Result History          Result Care Coordination      Result Notes       Wale James MD   5/31/2022  8:25 AM CDT Back to Top      Discussed via portal on 5/31/22. Referred to surgery    Patient Communication      Add Comments  Seen Back to Top                US Abdomen Complete: Patient Communication    Add Comments  Seen      External Result Report    External Result Report      Narrative & Impression      EXAMINATION:  US ABDOMEN COMPLETE     CLINICAL HISTORY:  Right upper quadrant pain     TECHNIQUE:  Complete abdominal ultrasound (including pancreas, aorta, liver, gallbladder, common bile duct, IVC, kidneys, and spleen) was performed.     COMPARISON:  None     FINDINGS:  Pancreas: The visualized portions of pancreas appear normal.     Aorta: No aneurysm.     Liver: 15.5 cm, normal in size. Homogeneous parenchymal echotexture. No focal lesions.     Gallbladder: Significantly contracted.  Shadowing stone is not identified but the imaging is suboptimal.  Negative sonographic Xie's sign.     Biliary system: 2.3 mm  common bile duct.  No intrahepatic ductal dilatation.     Inferior vena cava: Normal in appearance.     Right kidney: 11.7 cm. No hydronephrosis.     Left kidney: 11.9 cm. Nine     Spleen: 9.7 cm.  Normal in size with homogeneous echotexture.     Miscellaneous: No ascites.     Impression:     Suboptimal imaging of the gallbladder due to significant contraction.  Otherwise negative abdomen ultrasound        Electronically signed by: Minor Millan MD  Date:                                            05/26/2022  Time:                                           11:13     Encounter      View Encounter               Signed by    Signed Time Phone Pager  Minor Millan Jr., MD 5/26/2022 11:13 [phone]     Reviewed By    Wale James MD on 5/31/2022 08:25          Exam Details    Performed Procedure Technologist Supporting Staff Performing Physician  US Abdomen Complete Maria Elena Rebollar RDMS         Appointment Date/Status Modality Department   5/26/2022     Completed [facility] US 2 [facility] ULTRASOUND        Begin Exam End Exam Begin Exam Questionnaires End Exam Questionnaires  5/26/2022  9:11 AM 5/26/2022  9:48 AM RIS PREGNANCY TECH NAVIGATOR IMAGING END ALL           Reason for Exam  Priority: Routine  Dx: RUQ abdominal pain (R10.11 (ICD-10-CM))        Order Report    Order Details             Musculoskeletal: Negative for back pain and neck pain.   Skin: Negative for pallor and rash.   Neurological: Negative for dizziness, seizures, syncope, speech difficulty, weakness and numbness.   Hematological: Negative for adenopathy.   Psychiatric/Behavioral: Negative for confusion.       Objective:      Physical Exam  Vitals reviewed.   Constitutional:       Appearance: She is well-developed.      Comments: Well-nourished well-hydrated overweight afebrile nonicteric white female.  She is sitting comfortably in the chair in breathing normally.  She appears to be oriented x3 and can relate her history and answer questions  appropriately.  She is normocephalic.  Pupils are normal.   HENT:      Head: Normocephalic.   Eyes:      Pupils: Pupils are equal, round, and reactive to light.   Neck:      Thyroid: No thyromegaly.      Trachea: No tracheal deviation.   Cardiovascular:      Rate and Rhythm: Normal rate and regular rhythm.      Heart sounds: Normal heart sounds.   Pulmonary:      Effort: Pulmonary effort is normal.      Breath sounds: Normal breath sounds.   Abdominal:      General: Bowel sounds are normal. There is no distension.      Palpations: Abdomen is soft. There is no mass.      Tenderness: There is abdominal tenderness. There is no right CVA tenderness, left CVA tenderness, guarding or rebound.      Hernia: No hernia is present.      Comments: The abdomen is soft is mildly obese with mild tenderness to palpation in the epigastrium right upper quadrant.  Organomegaly or masses are not detected.  Bowel sounds are normal.   Musculoskeletal:         General: Normal range of motion.      Cervical back: Normal range of motion and neck supple.      Comments: She can ambulate normally.  She can go from the sitting to the standing position without difficulty.  She can get on the exam table without difficulty or assistance.   Lymphadenopathy:      Cervical: No cervical adenopathy.   Skin:     General: Skin is warm and dry.   Neurological:      Mental Status: She is alert and oriented to person, place, and time.      Cranial Nerves: No cranial nerve deficit.   Psychiatric:         Behavior: Behavior normal.           Plan:       Iron deficiency anemia, unspecified iron deficiency anemia type  -     Iron and TIBC; Future; Expected date: 06/13/2022    Nausea  -     Ambulatory referral/consult to Gastroenterology    Upper abdominal pain, unspecified  -     NM Hepatobiliary Scan W Pharm Intervention; Future; Expected date: 06/13/2022    Gastroesophageal reflux disease, unspecified whether esophagitis present    Obese abdomen    She will  continue her reflux regimen current medications vitamins and minerals.  Evaluation of her anemia is in progress.  A biliary scan and is scheduled.  She will be scheduled for upper endoscopy.  She has good health knowledge and she understands the procedure.  Specifically she realizes there is an extremely small incidence of bleeding perforation or aspiration.  She follows up with her gynecologist for evaluation of the menorrhagia.

## 2022-06-14 ENCOUNTER — TELEPHONE (OUTPATIENT)
Dept: GASTROENTEROLOGY | Facility: CLINIC | Age: 27
End: 2022-06-14
Payer: COMMERCIAL

## 2022-06-14 RX ORDER — IRON,CARBONYL/ASCORBIC ACID 100-250 MG
1 TABLET ORAL DAILY
Qty: 30 TABLET | Refills: 11 | Status: SHIPPED | OUTPATIENT
Start: 2022-06-14 | End: 2023-06-09

## 2022-06-14 NOTE — TELEPHONE ENCOUNTER
----- Message from Paris Crook sent at 6/14/2022  4:40 PM CDT -----  Regarding: RE: peer to peer  I submitted an auth to her insurance to get her procedure approved and it was denied. They are requesting to MD to call in for a peer to peer.   ----- Message -----  From: Alyson Gomez  Sent: 6/14/2022   4:32 PM CDT  To: Paris Crook  Subject: FW: peer to peer                                 I am sorry, what authorization was denied.  ----- Message -----  From: Paris Crook  Sent: 6/14/2022  10:45 AM CDT  To: Nigel Mendez Staff  Subject: peer to peer                                     Good Morning,     This patient's insurance has denied her auth and a peer to peer is needed for approval. Peer to peer can be done by calling [phone]. I have also sent the insurance clinicals.     Thank You,   Paris Crook   Pre-Service Rep  Mendel Navarro  Phone:[phone]  Fax:[phone]

## 2022-06-20 ENCOUNTER — TELEPHONE (OUTPATIENT)
Dept: GASTROENTEROLOGY | Facility: CLINIC | Age: 27
End: 2022-06-20
Payer: COMMERCIAL

## 2022-06-20 ENCOUNTER — PATIENT MESSAGE (OUTPATIENT)
Dept: SURGERY | Facility: HOSPITAL | Age: 27
End: 2022-06-20
Payer: COMMERCIAL

## 2022-06-20 DIAGNOSIS — K21.9 GASTROESOPHAGEAL REFLUX DISEASE, UNSPECIFIED WHETHER ESOPHAGITIS PRESENT: Primary | ICD-10-CM

## 2022-06-20 NOTE — TELEPHONE ENCOUNTER
----- Message from Andrea Manning MD sent at 6/20/2022  7:14 AM CDT -----  Regarding: FW: EGD  Schedule barium swallow and upper GI x-rays  ----- Message -----  From: Alyson Gomez  Sent: 6/17/2022   1:33 PM CDT  To: Andrea Manning MD  Subject: EGD                                              Pt's EGD was denied by insurance.  Please advise.

## 2022-06-20 NOTE — TELEPHONE ENCOUNTER
Call placed to pt and discussed Dr Manning advisement. UGI with esophagram scheduled for 6/23.  Patient verbalized understanding and has no further questions or concerns at this time. Instructed patient to return call with any further questions or concerns.

## 2022-06-23 ENCOUNTER — TELEPHONE (OUTPATIENT)
Dept: GASTROENTEROLOGY | Facility: CLINIC | Age: 27
End: 2022-06-23
Payer: COMMERCIAL

## 2022-06-23 ENCOUNTER — OFFICE VISIT (OUTPATIENT)
Dept: SURGERY | Facility: CLINIC | Age: 27
End: 2022-06-23
Payer: COMMERCIAL

## 2022-06-23 ENCOUNTER — PATIENT MESSAGE (OUTPATIENT)
Dept: GASTROENTEROLOGY | Facility: CLINIC | Age: 27
End: 2022-06-23
Payer: COMMERCIAL

## 2022-06-23 ENCOUNTER — HOSPITAL ENCOUNTER (OUTPATIENT)
Dept: RADIOLOGY | Facility: HOSPITAL | Age: 27
Discharge: HOME | End: 2022-06-23
Attending: INTERNAL MEDICINE
Payer: COMMERCIAL

## 2022-06-23 ENCOUNTER — PATIENT MESSAGE (OUTPATIENT)
Dept: FAMILY MEDICINE | Facility: CLINIC | Age: 27
End: 2022-06-23
Payer: COMMERCIAL

## 2022-06-23 VITALS
DIASTOLIC BLOOD PRESSURE: 82 MMHG | HEIGHT: 67 IN | OXYGEN SATURATION: 98 % | WEIGHT: 205 LBS | BODY MASS INDEX: 32.18 KG/M2 | HEART RATE: 82 BPM | SYSTOLIC BLOOD PRESSURE: 123 MMHG

## 2022-06-23 DIAGNOSIS — K90.49 FATTY FOOD INTOLERANCE: ICD-10-CM

## 2022-06-23 DIAGNOSIS — K21.9 GASTROESOPHAGEAL REFLUX DISEASE, UNSPECIFIED WHETHER ESOPHAGITIS PRESENT: ICD-10-CM

## 2022-06-23 DIAGNOSIS — R10.2 PELVIC PAIN: Primary | ICD-10-CM

## 2022-06-23 DIAGNOSIS — K25.9 GASTRIC ULCER, UNSPECIFIED CHRONICITY, UNSPECIFIED WHETHER GASTRIC ULCER HEMORRHAGE OR PERFORATION PRESENT: Primary | ICD-10-CM

## 2022-06-23 DIAGNOSIS — K25.9 GASTRIC ULCER: ICD-10-CM

## 2022-06-23 DIAGNOSIS — R10.11 RUQ ABDOMINAL PAIN: Primary | ICD-10-CM

## 2022-06-23 PROCEDURE — 3079F PR MOST RECENT DIASTOLIC BLOOD PRESSURE 80-89 MM HG: ICD-10-PCS | Mod: S$GLB,,, | Performed by: SURGERY

## 2022-06-23 PROCEDURE — 99204 OFFICE O/P NEW MOD 45 MIN: CPT | Mod: S$GLB,,, | Performed by: SURGERY

## 2022-06-23 PROCEDURE — 3079F DIAST BP 80-89 MM HG: CPT | Mod: S$GLB,,, | Performed by: SURGERY

## 2022-06-23 PROCEDURE — 99999 PR PBB SHADOW E&M-EST. PATIENT-LVL IV: CPT | Mod: PBBFAC,,, | Performed by: SURGERY

## 2022-06-23 PROCEDURE — 3008F PR BODY MASS INDEX (BMI) DOCUMENTED: ICD-10-PCS | Mod: S$GLB,,, | Performed by: SURGERY

## 2022-06-23 PROCEDURE — 3008F BODY MASS INDEX DOCD: CPT | Mod: S$GLB,,, | Performed by: SURGERY

## 2022-06-23 PROCEDURE — 99999 PR PBB SHADOW E&M-EST. PATIENT-LVL IV: ICD-10-PCS | Mod: PBBFAC,,, | Performed by: SURGERY

## 2022-06-23 PROCEDURE — 1159F MED LIST DOCD IN RCRD: CPT | Mod: S$GLB,,, | Performed by: SURGERY

## 2022-06-23 PROCEDURE — 1159F PR MEDICATION LIST DOCUMENTED IN MEDICAL RECORD: ICD-10-PCS | Mod: S$GLB,,, | Performed by: SURGERY

## 2022-06-23 PROCEDURE — 3074F PR MOST RECENT SYSTOLIC BLOOD PRESSURE < 130 MM HG: ICD-10-PCS | Mod: S$GLB,,, | Performed by: SURGERY

## 2022-06-23 PROCEDURE — 3044F HG A1C LEVEL LT 7.0%: CPT | Mod: S$GLB,,, | Performed by: SURGERY

## 2022-06-23 PROCEDURE — 74240 FL UPPER GI TO INCLUDE ESOPHAGRAM: ICD-10-PCS | Mod: 26,,, | Performed by: RADIOLOGY

## 2022-06-23 PROCEDURE — 3074F SYST BP LT 130 MM HG: CPT | Mod: S$GLB,,, | Performed by: SURGERY

## 2022-06-23 PROCEDURE — 74240 X-RAY XM UPR GI TRC 1CNTRST: CPT | Mod: 26,,, | Performed by: RADIOLOGY

## 2022-06-23 PROCEDURE — 74240 X-RAY XM UPR GI TRC 1CNTRST: CPT | Mod: TC,FY

## 2022-06-23 PROCEDURE — A9698 NON-RAD CONTRAST MATERIALNOC: HCPCS | Performed by: INTERNAL MEDICINE

## 2022-06-23 PROCEDURE — 25500020 PHARM REV CODE 255: Performed by: INTERNAL MEDICINE

## 2022-06-23 PROCEDURE — 99204 PR OFFICE/OUTPT VISIT, NEW, LEVL IV, 45-59 MIN: ICD-10-PCS | Mod: S$GLB,,, | Performed by: SURGERY

## 2022-06-23 PROCEDURE — 3044F PR MOST RECENT HEMOGLOBIN A1C LEVEL <7.0%: ICD-10-PCS | Mod: S$GLB,,, | Performed by: SURGERY

## 2022-06-23 RX ORDER — SODIUM CHLORIDE 0.9 % (FLUSH) 0.9 %
10 SYRINGE (ML) INJECTION
Status: CANCELLED | OUTPATIENT
Start: 2022-06-23

## 2022-06-23 RX ADMIN — BARIUM SULFATE 140 ML: 0.6 SUSPENSION ORAL at 12:06

## 2022-06-23 RX ADMIN — BARIUM SULFATE 135 ML: 980 POWDER, FOR SUSPENSION ORAL at 12:06

## 2022-06-23 NOTE — TELEPHONE ENCOUNTER
----- Message from Andrea Manning MD sent at 6/23/2022  1:28 PM CDT -----  X-rays revealed probable ulcer.  Schedule EGD.

## 2022-06-23 NOTE — PROGRESS NOTES
[facility] H&P Note    Subjective:       Patient ID: Gustavo Perry is a 27 y.o. female.    Chief Complaint:  Right upper quadrant pain.  HPI:  Gustavo Perry is a 27 y.o. female history of anxiety bipolar depression PTSD presents today as a new patient referral from Dr. James for evaluation of right upper quadrant pain.  Patient states her symptoms have been going on for the last couple of months.  Associated with greasy fried fatty food ingestion.  Bloating present.  Right upper quadrant pain after ingestion of these foods.  Gets better in the next couple of hours after ingestion.  No fevers or chills.  She has seen gastroenterology.  She is scheduled for upper GI.  She is concerned that it could possibly be her gallbladder given her family has had gallbladder troubles, her mother had her gallbladder out early in the year.  She presents today surgery Clinic for evaluation of gallbladder.  Ultrasound done negative for stones.    Past Medical History:   Diagnosis Date    Anxiety     Bipolar 1 disorder, depressed     Depression     PTSD (post-traumatic stress disorder)      Past Surgical History:   Procedure Laterality Date     SECTION      TONSILLECTOMY, ADENOIDECTOMY      TUBAL LIGATION      WISDOM TOOTH EXTRACTION       Family History   Problem Relation Age of Onset    Hypertension Mother     No Known Problems Father      Social History     Socioeconomic History    Marital status:    Tobacco Use    Smoking status: Current Every Day Smoker     Packs/day: 0.50     Years: 9.00     Pack years: 4.50     Types: Cigarettes    Smokeless tobacco: Never Used   Substance and Sexual Activity    Alcohol use: Not Currently     Comment: socially    Drug use: Never    Sexual activity: Yes     Partners: Male     Birth control/protection: None, See Surgical Hx     Comment: Tubal Ligation -        Current Outpatient Medications   Medication Sig Dispense Refill     "iron-vitamin C 100-250 mg, ICAR-C, (ICAR-C) 100-250 mg Tab Take 1 tablet by mouth once daily at 6am. 30 tablet 11    ondansetron (ZOFRAN-ODT) 4 MG TbDL Take 1 tablet (4 mg total) by mouth every 6 (six) hours as needed (nausea or vomiting). 30 tablet 1    ibuprofen (ADVIL,MOTRIN) 200 MG tablet Take 200 mg by mouth every 6 (six) hours as needed for Pain.       No current facility-administered medications for this visit.     Review of patient's allergies indicates:   Allergen Reactions    Sulfa (sulfonamide antibiotics) Hives       Review of Systems   Constitutional: Negative for activity change, appetite change, chills and fever.   HENT: Negative for congestion, dental problem and ear discharge.    Eyes: Negative for discharge and itching.   Respiratory: Negative for apnea, choking and chest tightness.    Cardiovascular: Negative for chest pain and leg swelling.   Gastrointestinal: Positive for abdominal pain. Negative for abdominal distention, anal bleeding, constipation, diarrhea and nausea.   Endocrine: Negative for cold intolerance and heat intolerance.   Genitourinary: Negative for difficulty urinating and dyspareunia.   Musculoskeletal: Negative for arthralgias and back pain.   Skin: Negative for color change and pallor.   Neurological: Negative for dizziness and facial asymmetry.   Hematological: Negative for adenopathy. Does not bruise/bleed easily.   Psychiatric/Behavioral: Negative for agitation and behavioral problems.       Objective:      Vitals:    06/23/22 0846   BP: 123/82   Pulse: 82   SpO2: 98%   Weight: 93 kg (205 lb)   Height: 5' 7" (1.702 m)     Physical Exam  Constitutional:       General: She is not in acute distress.     Appearance: She is well-developed.   HENT:      Head: Normocephalic and atraumatic.   Eyes:      Pupils: Pupils are equal, round, and reactive to light.   Neck:      Thyroid: No thyromegaly.   Cardiovascular:      Rate and Rhythm: Normal rate and regular rhythm.   Pulmonary: "      Effort: Pulmonary effort is normal.      Breath sounds: Normal breath sounds.   Abdominal:      General: Bowel sounds are normal. There is no distension.      Palpations: Abdomen is soft.      Tenderness: There is abdominal tenderness in the right upper quadrant and right lower quadrant.   Musculoskeletal:         General: No deformity. Normal range of motion.      Cervical back: Normal range of motion and neck supple.   Skin:     General: Skin is warm.      Capillary Refill: Capillary refill takes less than 2 seconds.      Findings: No erythema.   Neurological:      Mental Status: She is alert and oriented to person, place, and time.      Cranial Nerves: No cranial nerve deficit.   Psychiatric:         Behavior: Behavior normal.         Lab Review:   CBC:   Lab Results   Component Value Date    WBC 6.46 05/16/2022    RBC 4.52 05/16/2022    HGB 11.9 (L) 05/16/2022    HCT 36.0 (L) 05/16/2022     05/16/2022     BMP:   Lab Results   Component Value Date    GLU 85 05/16/2022     05/16/2022    K 3.7 05/16/2022     05/16/2022    CO2 25 05/16/2022    BUN 9 05/16/2022    CREATININE 0.9 05/16/2022    CALCIUM 9.1 05/16/2022     Lab Results   Component Value Date    ALT 18 05/16/2022    AST 16 05/16/2022    ALKPHOS 77 05/16/2022    BILITOT 0.2 05/16/2022     Diagnostics Review: US: Reviewed negative for gallbladder stones.     Assessment:       1. RUQ abdominal pain    2. Fatty food intolerance        Plan:   RUQ abdominal pain  -     Ambulatory referral/consult to General Surgery  -     NM Hepatobiliary Scan W Pharm Intervention; Future; Expected date: 06/23/2022    Fatty food intolerance  -     NM Hepatobiliary Scan W Pharm Intervention; Future; Expected date: 06/23/2022        Medical Decision Making/Counseling:  Right upper quadrant pain.  Ultrasound negative for stones gallbladder.  Recommendation be HIDA scan.  Patient was instructed to be mindful of symptoms during HIDA scan.  If reproduction of  symptoms associated with HIDA scan, highly suggestive of gallbladder etiology the patient's symptoms.  Patient voiced understanding common agreement.    Right lower quadrant pain/tenderness.  Recommended for patient to be evaluated by OBGYN for further evaluation.  She has establish OBGYN.  This could be related to ovarian cyst etcetera.  She states that this lower quadrant pain is unrelated to the upper quadrant pain and is associated with her menstrual cycles.    Patient instructed that best way to communicate with my office staff is for patient to get on the [facility] epic patient portal to expedite communication and communication issues that may occur.  Patient was given instructions on how to get on the portal.  I encouraged patient to obtain portal access as well.  Ultimately it is up to the patient to obtain access.  Patient voiced understanding.

## 2022-06-23 NOTE — TELEPHONE ENCOUNTER
Call placed to pt to inform of recent results per Dr. Manning advisement.  EGD scheduled for 7/5.  Preop instructions reviewed.  Pt verbalized understanding and has no further questions at this time.  Instructed pt to return call with any further questions or concerns.

## 2022-06-24 ENCOUNTER — HOSPITAL ENCOUNTER (OUTPATIENT)
Dept: RADIOLOGY | Facility: HOSPITAL | Age: 27
Discharge: HOME | End: 2022-06-24
Attending: SURGERY
Payer: COMMERCIAL

## 2022-06-24 ENCOUNTER — TELEPHONE (OUTPATIENT)
Dept: FAMILY MEDICINE | Facility: CLINIC | Age: 27
End: 2022-06-24
Payer: COMMERCIAL

## 2022-06-24 ENCOUNTER — PATIENT MESSAGE (OUTPATIENT)
Dept: SURGERY | Facility: HOSPITAL | Age: 27
End: 2022-06-24
Payer: COMMERCIAL

## 2022-06-24 ENCOUNTER — PATIENT MESSAGE (OUTPATIENT)
Dept: SURGERY | Facility: CLINIC | Age: 27
End: 2022-06-24
Payer: COMMERCIAL

## 2022-06-24 DIAGNOSIS — K90.49 FATTY FOOD INTOLERANCE: ICD-10-CM

## 2022-06-24 DIAGNOSIS — R10.11 RUQ ABDOMINAL PAIN: ICD-10-CM

## 2022-06-24 PROCEDURE — 78227 NM HEPATOBILIARY(HIDA) WITH PHARM AND EF: ICD-10-PCS | Mod: 26,,, | Performed by: RADIOLOGY

## 2022-06-24 PROCEDURE — 78227 HEPATOBIL SYST IMAGE W/DRUG: CPT | Mod: 26,,, | Performed by: RADIOLOGY

## 2022-06-24 PROCEDURE — A9537 TC99M MEBROFENIN: HCPCS

## 2022-06-24 NOTE — TELEPHONE ENCOUNTER
----- Message from Paris Crook sent at 6/24/2022  9:04 AM CDT -----  Regarding: referral  Good Morning,     This patient insurance requires auth for her procedure. I noticed that she was rescheduled, will the MD call the insurance to conduct a peer to peer so that the patient is not denied again.      Thank You,   Paris Crook   Pre-Service Rep  Mendel Navarro  Phone:[phone]  Fax:[phone]  Email: [email or web address]

## 2022-06-27 ENCOUNTER — TELEPHONE (OUTPATIENT)
Dept: FAMILY MEDICINE | Facility: CLINIC | Age: 27
End: 2022-06-27
Payer: COMMERCIAL

## 2022-06-28 ENCOUNTER — PATIENT MESSAGE (OUTPATIENT)
Dept: SURGERY | Facility: CLINIC | Age: 27
End: 2022-06-28
Payer: COMMERCIAL

## 2022-06-28 NOTE — TELEPHONE ENCOUNTER
Writer spoke to pt, pt said she would be able to come in on Thursday. Pt was scheduled on 06/30/22 at 10:30am to discuss HIDA results with Dr. Eric Huston. Pt verbalized understanding

## 2022-06-30 ENCOUNTER — OFFICE VISIT (OUTPATIENT)
Dept: SURGERY | Facility: CLINIC | Age: 27
End: 2022-06-30
Payer: COMMERCIAL

## 2022-06-30 VITALS
SYSTOLIC BLOOD PRESSURE: 126 MMHG | HEART RATE: 75 BPM | BODY MASS INDEX: 32.02 KG/M2 | WEIGHT: 204 LBS | OXYGEN SATURATION: 99 % | DIASTOLIC BLOOD PRESSURE: 84 MMHG | HEIGHT: 67 IN

## 2022-06-30 DIAGNOSIS — K82.8 BILIARY DYSKINESIA: Primary | ICD-10-CM

## 2022-06-30 PROCEDURE — 99999 PR PBB SHADOW E&M-EST. PATIENT-LVL IV: ICD-10-PCS | Mod: PBBFAC,,, | Performed by: SURGERY

## 2022-06-30 PROCEDURE — 99999 PR PBB SHADOW E&M-EST. PATIENT-LVL IV: CPT | Mod: PBBFAC,,, | Performed by: SURGERY

## 2022-06-30 PROCEDURE — 3044F PR MOST RECENT HEMOGLOBIN A1C LEVEL <7.0%: ICD-10-PCS | Mod: S$GLB,,, | Performed by: SURGERY

## 2022-06-30 PROCEDURE — 3008F BODY MASS INDEX DOCD: CPT | Mod: S$GLB,,, | Performed by: SURGERY

## 2022-06-30 PROCEDURE — 99215 OFFICE O/P EST HI 40 MIN: CPT | Mod: S$GLB,,, | Performed by: SURGERY

## 2022-06-30 PROCEDURE — 3079F PR MOST RECENT DIASTOLIC BLOOD PRESSURE 80-89 MM HG: ICD-10-PCS | Mod: S$GLB,,, | Performed by: SURGERY

## 2022-06-30 PROCEDURE — 3074F SYST BP LT 130 MM HG: CPT | Mod: S$GLB,,, | Performed by: SURGERY

## 2022-06-30 PROCEDURE — 3008F PR BODY MASS INDEX (BMI) DOCUMENTED: ICD-10-PCS | Mod: S$GLB,,, | Performed by: SURGERY

## 2022-06-30 PROCEDURE — 3074F PR MOST RECENT SYSTOLIC BLOOD PRESSURE < 130 MM HG: ICD-10-PCS | Mod: S$GLB,,, | Performed by: SURGERY

## 2022-06-30 PROCEDURE — 3044F HG A1C LEVEL LT 7.0%: CPT | Mod: S$GLB,,, | Performed by: SURGERY

## 2022-06-30 PROCEDURE — 3079F DIAST BP 80-89 MM HG: CPT | Mod: S$GLB,,, | Performed by: SURGERY

## 2022-06-30 PROCEDURE — 99215 PR OFFICE/OUTPT VISIT, EST, LEVL V, 40-54 MIN: ICD-10-PCS | Mod: S$GLB,,, | Performed by: SURGERY

## 2022-06-30 RX ORDER — SODIUM CHLORIDE 9 MG/ML
INJECTION, SOLUTION INTRAVENOUS CONTINUOUS
Status: CANCELLED | OUTPATIENT
Start: 2022-06-30

## 2022-06-30 NOTE — H&P
[facility] H&P Note    Subjective:       Patient ID: Gustavo Perry is a 27 y.o. female.    Chief Complaint:  I want my gallbladder  HPI:  Gustavo Perry is a 27 y.o. female out.  History of anxiety depression presents today as an established patient for evaluation of right upper quadrant pain.  Patient since last visit has undergone HIDA scan.  HIDA scan shows evidence of low ejection fraction of gallbladder consistent with biliary dyskinesia.  Patient with reproduction of symptoms associated with CCK administration portion examination.  Patient stated that she had severe right upper quadrant pain after the test after she got home.  Nausea no vomiting.  Given the above, patient presents today for follow-up evaluation desired to proceed with gallbladder surgery.    Past Medical History:   Diagnosis Date    Anxiety     Bipolar 1 disorder, depressed     Depression     PTSD (post-traumatic stress disorder)      Past Surgical History:   Procedure Laterality Date     SECTION      TONSILLECTOMY, ADENOIDECTOMY      TUBAL LIGATION      WISDOM TOOTH EXTRACTION       Family History   Problem Relation Age of Onset    Hypertension Mother     No Known Problems Father      Social History     Socioeconomic History    Marital status:    Tobacco Use    Smoking status: Current Every Day Smoker     Packs/day: 0.50     Years: 9.00     Pack years: 4.50     Types: Cigarettes    Smokeless tobacco: Never Used   Substance and Sexual Activity    Alcohol use: Not Currently     Comment: socially    Drug use: Never    Sexual activity: Yes     Partners: Male     Birth control/protection: None, See Surgical Hx     Comment: Tubal Ligation -        Current Outpatient Medications   Medication Sig Dispense Refill    iron-vitamin C 100-250 mg, ICAR-C, (ICAR-C) 100-250 mg Tab Take 1 tablet by mouth once daily at 6am. 30 tablet 11    ondansetron (ZOFRAN-ODT) 4 MG TbDL Take 1 tablet (4 mg total) by  "mouth every 6 (six) hours as needed (nausea or vomiting). 30 tablet 1    ibuprofen (ADVIL,MOTRIN) 200 MG tablet Take 200 mg by mouth every 6 (six) hours as needed for Pain.       Current Facility-Administered Medications   Medication Dose Route Frequency Provider Last Rate Last Admin    ceFOXItin (MEFOXIN) 2 g in dextrose 5 % 50 mL IVPB  2 g Intravenous On Call Procedure Eric Huston MD         Review of patient's allergies indicates:   Allergen Reactions    Sulfa (sulfonamide antibiotics) Hives       Review of Systems   Constitutional: Negative for activity change, appetite change, chills and fever.   HENT: Negative for congestion, dental problem and ear discharge.    Eyes: Negative for discharge and itching.   Respiratory: Negative for apnea, choking and chest tightness.    Cardiovascular: Negative for chest pain and leg swelling.   Gastrointestinal: Positive for abdominal pain. Negative for abdominal distention, anal bleeding, constipation, diarrhea and nausea.   Endocrine: Negative for cold intolerance and heat intolerance.   Genitourinary: Negative for difficulty urinating and dyspareunia.   Musculoskeletal: Negative for arthralgias and back pain.   Skin: Negative for color change and pallor.   Neurological: Negative for dizziness and facial asymmetry.   Hematological: Negative for adenopathy. Does not bruise/bleed easily.   Psychiatric/Behavioral: Negative for agitation and behavioral problems.       Objective:      Vitals:    06/30/22 1018   BP: 126/84   Pulse: 75   SpO2: 99%   Weight: 92.5 kg (204 lb)   Height: 5' 7" (1.702 m)     Physical Exam  Constitutional:       General: She is not in acute distress.     Appearance: She is well-developed.   HENT:      Head: Normocephalic and atraumatic.   Eyes:      Pupils: Pupils are equal, round, and reactive to light.   Neck:      Thyroid: No thyromegaly.   Cardiovascular:      Rate and Rhythm: Normal rate and regular rhythm.   Pulmonary:      Effort: " Pulmonary effort is normal.      Breath sounds: Normal breath sounds.   Abdominal:      General: Bowel sounds are normal. There is no distension.      Palpations: Abdomen is soft.      Tenderness: There is abdominal tenderness in the right upper quadrant.   Musculoskeletal:         General: No deformity. Normal range of motion.      Cervical back: Normal range of motion and neck supple.   Skin:     General: Skin is warm.      Capillary Refill: Capillary refill takes less than 2 seconds.      Findings: No erythema.   Neurological:      Mental Status: She is alert and oriented to person, place, and time.      Cranial Nerves: No cranial nerve deficit.   Psychiatric:         Behavior: Behavior normal.         Lab Review:   CBC:   Lab Results   Component Value Date    WBC 6.46 05/16/2022    RBC 4.52 05/16/2022    HGB 11.9 (L) 05/16/2022    HCT 36.0 (L) 05/16/2022     05/16/2022     BMP:   Lab Results   Component Value Date    GLU 85 05/16/2022     05/16/2022    K 3.7 05/16/2022     05/16/2022    CO2 25 05/16/2022    BUN 9 05/16/2022    CREATININE 0.9 05/16/2022    CALCIUM 9.1 05/16/2022     Diagnostics Review: HIDA scan reviewed.  Evidence of low ejection fraction of gallbladder.  Reproduction of symptoms associated with CCK administration portion     Assessment:       1. Biliary dyskinesia        Plan:   Biliary dyskinesia  -     Case Request Operating Room: CHOLECYSTECTOMY-LAPAROSCOPIC  -     Full code; Standing  -     Place in Outpatient; Standing  -     Vital signs; Standing  -     Insert peripheral IV; Standing  -     Verify beta-blocker dose taken within 24 hours if patient is prescribed beta-blocker; Standing  -     Height and weight; Standing  -     Intake and output; Standing  -     Verify discontinuation of antithrombotics; Standing  -     POCT glucose; Standing  -     Verify blood consent; Standing  -     Verify consent; Standing  -     Clip and Prep Abdomen Zyphoid to Pubis; Standing  -      Chlorhexidine (CHG) 2% Wipes; Standing  -     Hibiclens shower; Standing  -     Hibiclens shower; Standing  -     Notify Physician; Standing  -     Diet NPO; Standing  -     Place MARIA A hose; Standing  -     Place sequential compression device; Standing    Other orders  -     0.9%  NaCl infusion  -     IP VTE LOW RISK PATIENT; Standing  -     ceFOXItin (MEFOXIN) 2 g in dextrose 5 % 50 mL IVPB        Medical Decision Making/Counseling:  Patient with evidence of biliary colic with biliary dyskinesia.  Patient offered low-fat diet versus cholecystectomy.  Risk benefits were discussed in detail the patient clinic today.  After risk benefits discussion, patient voiced understanding risk benefits and desires proceed with surgical cholecystectomy.  All questions were answered patient satisfaction.    Risks and benefits of cholecystectomy were discussed with the patient. Benefits the patient could receive would be the resolution of gallbladder attacks causing pain, cramps, nausea vomiting etc.  Benefits also include eliminating the risk of the patient presenting through the ER with acute cholecystitis and needing an emergent operation.  Risks of cholecystectomy were also included in our discussion.  Risks include injury to the common bile duct (liver drain tube) occurring in 1 in 250 cholecystectomy is performed across the [place].  I discussed with the patient that if this were to occur she could need further surgeries to include likely a hepaticojejunostomy.  I discussed there is a possibility of transfer for surgical therapy for this, if it were to occur.  I also discussed the risk of conversion to an open procedure (cold fashion surgery, how gallbladders were taken out previously) to occur in 5% of cholecystectomies.  I discussed that in 100% cholecystectomies I started with the small incision (laparoscopic technique).  Reason for conversion to an open procedure is related to bleeding, significant scarring or  inflammation, inability to obtain a critical view which could lead to injury of surrounding structures to include the stomach, duodenum, IVC, common bile duct, etc.  I discussed there is also a possibility of postoperatively needing reoperation.  Additionally, I have discussed with the patient the possibilities of biliary leak after laparoscopic cholecystectomy.  Literature would suggest a 2% leak rate.  This could necessitate a postoperative ERCP or even postoperative procedure including diagnostic laparoscopy for drainage or percutaneous drainage.  There is also the intrinsic risks of any surgery to include bleeding and infection.  Patient understands all the above risks and benefits and wishes to proceed in the near future with laparoscopic versus open cholecystectomy.  Counseling time 60 minutes in clinic.  I drew a picture for the patient of the foregut liver biliary anatomy and tree for further understanding while in clinic.    Patient instructed that best way to communicate with my office staff is for patient to get on the [facility] epic patient portal to expedite communication and communication issues that may occur.  Patient was given instructions on how to get on the portal.  I encouraged patient to obtain portal access as well.  Ultimately it is up to the patient to obtain access.  Patient voiced understanding.

## 2022-07-05 ENCOUNTER — ANESTHESIA EVENT (OUTPATIENT)
Dept: SURGERY | Facility: HOSPITAL | Age: 27
End: 2022-07-05
Payer: COMMERCIAL

## 2022-07-05 ENCOUNTER — ANESTHESIA (OUTPATIENT)
Dept: SURGERY | Facility: HOSPITAL | Age: 27
End: 2022-07-05
Payer: COMMERCIAL

## 2022-07-05 ENCOUNTER — PATIENT MESSAGE (OUTPATIENT)
Dept: FAMILY MEDICINE | Facility: CLINIC | Age: 27
End: 2022-07-05
Payer: COMMERCIAL

## 2022-07-05 ENCOUNTER — HOSPITAL ENCOUNTER (OUTPATIENT)
Facility: HOSPITAL | Age: 27
Discharge: HOME | End: 2022-07-05
Attending: INTERNAL MEDICINE | Admitting: INTERNAL MEDICINE
Payer: COMMERCIAL

## 2022-07-05 VITALS
OXYGEN SATURATION: 100 % | SYSTOLIC BLOOD PRESSURE: 119 MMHG | TEMPERATURE: 98 F | HEIGHT: 67 IN | HEART RATE: 61 BPM | RESPIRATION RATE: 17 BRPM | DIASTOLIC BLOOD PRESSURE: 84 MMHG | BODY MASS INDEX: 32.18 KG/M2 | WEIGHT: 205 LBS

## 2022-07-05 DIAGNOSIS — K25.9 GASTRIC ULCER: ICD-10-CM

## 2022-07-05 DIAGNOSIS — K21.00 GASTROESOPHAGEAL REFLUX DISEASE WITH ESOPHAGITIS, UNSPECIFIED WHETHER HEMORRHAGE: Primary | ICD-10-CM

## 2022-07-05 DIAGNOSIS — K25.9 GASTRIC ULCER, UNSPECIFIED CHRONICITY, UNSPECIFIED WHETHER GASTRIC ULCER HEMORRHAGE OR PERFORATION PRESENT: ICD-10-CM

## 2022-07-05 LAB — SARS-COV-2 RDRP RESP QL NAA+PROBE: NEGATIVE

## 2022-07-05 PROCEDURE — D9220A PRA ANESTHESIA: Mod: ANES,,, | Performed by: ANESTHESIOLOGY

## 2022-07-05 PROCEDURE — 43239 EGD BIOPSY SINGLE/MULTIPLE: CPT | Performed by: INTERNAL MEDICINE

## 2022-07-05 PROCEDURE — 27201423 OPTIME MED/SURG SUP & DEVICES STERILE SUPPLY: Performed by: INTERNAL MEDICINE

## 2022-07-05 PROCEDURE — 43239 EGD BIOPSY SINGLE/MULTIPLE: CPT | Mod: ,,, | Performed by: INTERNAL MEDICINE

## 2022-07-05 PROCEDURE — U0002 COVID-19 LAB TEST NON-CDC: HCPCS | Performed by: INTERNAL MEDICINE

## 2022-07-05 PROCEDURE — 25000003 PHARM REV CODE 250: Performed by: NURSE ANESTHETIST, CERTIFIED REGISTERED

## 2022-07-05 PROCEDURE — D9220A PRA ANESTHESIA: ICD-10-PCS | Mod: CRNA,,, | Performed by: NURSE ANESTHETIST, CERTIFIED REGISTERED

## 2022-07-05 PROCEDURE — 43239 PR EGD, FLEX, W/BIOPSY, SGL/MULTI: ICD-10-PCS | Mod: ,,, | Performed by: INTERNAL MEDICINE

## 2022-07-05 PROCEDURE — 88305 TISSUE EXAM BY PATHOLOGIST: ICD-10-PCS | Mod: 26,,, | Performed by: PATHOLOGY

## 2022-07-05 PROCEDURE — 37000008 HC ANESTHESIA 1ST 15 MINUTES: Performed by: INTERNAL MEDICINE

## 2022-07-05 PROCEDURE — 88342 IMHCHEM/IMCYTCHM 1ST ANTB: CPT | Performed by: PATHOLOGY

## 2022-07-05 PROCEDURE — 88305 TISSUE EXAM BY PATHOLOGIST: CPT | Mod: 59 | Performed by: PATHOLOGY

## 2022-07-05 PROCEDURE — 63600175 PHARM REV CODE 636 W HCPCS: Performed by: ANESTHESIOLOGY

## 2022-07-05 PROCEDURE — D9220A PRA ANESTHESIA: ICD-10-PCS | Mod: ANES,,, | Performed by: ANESTHESIOLOGY

## 2022-07-05 PROCEDURE — 88342 CHG IMMUNOCYTOCHEMISTRY: ICD-10-PCS | Mod: 26,,, | Performed by: PATHOLOGY

## 2022-07-05 PROCEDURE — D9220A PRA ANESTHESIA: Mod: CRNA,,, | Performed by: NURSE ANESTHETIST, CERTIFIED REGISTERED

## 2022-07-05 PROCEDURE — 88305 TISSUE EXAM BY PATHOLOGIST: CPT | Mod: 26,,, | Performed by: PATHOLOGY

## 2022-07-05 PROCEDURE — 63600175 PHARM REV CODE 636 W HCPCS: Performed by: NURSE ANESTHETIST, CERTIFIED REGISTERED

## 2022-07-05 PROCEDURE — 37000009 HC ANESTHESIA EA ADD 15 MINS: Performed by: INTERNAL MEDICINE

## 2022-07-05 PROCEDURE — 88342 IMHCHEM/IMCYTCHM 1ST ANTB: CPT | Mod: 26,,, | Performed by: PATHOLOGY

## 2022-07-05 RX ORDER — SODIUM CHLORIDE, SODIUM LACTATE, POTASSIUM CHLORIDE, CALCIUM CHLORIDE 600; 310; 30; 20 MG/100ML; MG/100ML; MG/100ML; MG/100ML
125 INJECTION, SOLUTION INTRAVENOUS CONTINUOUS
Status: DISCONTINUED | OUTPATIENT
Start: 2022-07-05 | End: 2022-07-05 | Stop reason: HOSPADM

## 2022-07-05 RX ORDER — LIDOCAINE HYDROCHLORIDE 10 MG/ML
1 INJECTION, SOLUTION EPIDURAL; INFILTRATION; INTRACAUDAL; PERINEURAL ONCE
Status: DISCONTINUED | OUTPATIENT
Start: 2022-07-05 | End: 2022-07-05 | Stop reason: HOSPADM

## 2022-07-05 RX ORDER — ONDANSETRON 2 MG/ML
4 INJECTION INTRAMUSCULAR; INTRAVENOUS DAILY PRN
Status: DISCONTINUED | OUTPATIENT
Start: 2022-07-05 | End: 2022-07-05 | Stop reason: HOSPADM

## 2022-07-05 RX ORDER — LIDOCAINE HYDROCHLORIDE 20 MG/ML
INJECTION, SOLUTION EPIDURAL; INFILTRATION; INTRACAUDAL; PERINEURAL
Status: DISCONTINUED | OUTPATIENT
Start: 2022-07-05 | End: 2022-07-05

## 2022-07-05 RX ORDER — SODIUM CHLORIDE, SODIUM LACTATE, POTASSIUM CHLORIDE, CALCIUM CHLORIDE 600; 310; 30; 20 MG/100ML; MG/100ML; MG/100ML; MG/100ML
INJECTION, SOLUTION INTRAVENOUS CONTINUOUS
Status: DISCONTINUED | OUTPATIENT
Start: 2022-07-05 | End: 2022-07-05 | Stop reason: HOSPADM

## 2022-07-05 RX ORDER — PROPOFOL 10 MG/ML
VIAL (ML) INTRAVENOUS
Status: DISCONTINUED | OUTPATIENT
Start: 2022-07-05 | End: 2022-07-05

## 2022-07-05 RX ADMIN — SODIUM CHLORIDE, POTASSIUM CHLORIDE, SODIUM LACTATE AND CALCIUM CHLORIDE: 600; 310; 30; 20 INJECTION, SOLUTION INTRAVENOUS at 10:07

## 2022-07-05 RX ADMIN — PROPOFOL 50 MG: 10 INJECTION, EMULSION INTRAVENOUS at 10:07

## 2022-07-05 RX ADMIN — PROPOFOL 100 MG: 10 INJECTION, EMULSION INTRAVENOUS at 10:07

## 2022-07-05 RX ADMIN — PROPOFOL 50 MG: 10 INJECTION, EMULSION INTRAVENOUS at 11:07

## 2022-07-05 RX ADMIN — LIDOCAINE HYDROCHLORIDE 80 MG: 20 INJECTION, SOLUTION EPIDURAL; INFILTRATION; INTRACAUDAL; PERINEURAL at 10:07

## 2022-07-05 NOTE — PLAN OF CARE
Has met unit/department guidelines for discharge from each phase of the post procedure continuum. Leaving floor per w/c with RN and . AAO x3. Resp even and unlabored room air. No distress noted. Tolerating Po fluids without c/o nausea/vomiting. All personal belongings returned to pt.

## 2022-07-05 NOTE — [employer] PATIENT INSTRUCTIONS
Discharge Summary/Instructions after an Endoscopic Procedure  Patient Name: Gustavo Perry  Patient MRN: [id number]  Patient YOB: 1995 Tuesday, July 5, 2022  Andrea Manning MD  RESTRICTIONS:  During your procedure today, you received medications for sedation.  These   medications may affect your judgment, balance and coordination.  Therefore,   for 24 hours, you have the following restrictions:   - DO NOT drive a car, operate machinery, make legal/financial decisions,   sign important papers or drink alcohol.    ACTIVITY:  Today: no heavy lifting, straining or running due to procedural   sedation/anesthesia.  The following day: return to full activity including work.  DIET:  Eat and drink normally unless instructed otherwise.     TREATMENT FOR COMMON SIDE EFFECTS:  - Mild abdominal pain, nausea, belching, bloating or excessive gas:  rest,   eat lightly and use a heating pad.  - Sore Throat: treat with throat lozenges and/or gargle with warm salt   water.  - Because air was used during the procedure, expelling large amounts of air   from your rectum or belching is normal.  - If a bowel prep was taken, you may not have a bowel movement for 1-3 days.    This is normal.  SYMPTOMS TO WATCH FOR AND REPORT TO YOUR PHYSICIAN:  1. Abdominal pain or bloating, other than gas cramps.  2. Chest pain.  3. Back pain.  4. Signs of infection such as: chills or fever occurring within 24 hours   after the procedure.  5. Rectal bleeding, which would show as bright red, maroon, or black stools.   (A tablespoon of blood from the rectum is not serious, especially if   hemorrhoids are present.)  6. Vomiting.  7. Weakness or dizziness.  GO DIRECTLY TO THE NEAREST EMERGENCY ROOM IF YOU HAVE ANY OF THE FOLLOWING:      Difficulty breathing              Chills and/or fever over 101 F   Persistent vomiting and/or vomiting blood   Severe abdominal pain   Severe chest pain   Black, tarry stools   Bleeding- more than one tablespoon   Any  other symptom or condition that you feel may need urgent attention  Your doctor recommends these additional instructions:  If any biopsies were taken, your doctors clinic will contact you in 1 to 2   weeks with any results.  - Discharge patient to home (ambulatory).   - Resume previous diet.  For questions, problems or results please call your physician - Andrea Manning MD at Work:  [phone].  [facility] EMERGENCY ROOM PHONE NUMBER: [phone]  IF A COMPLICATION OR EMERGENCY SITUATION ARISES AND YOU ARE UNABLE TO REACH   YOUR PHYSICIAN - GO DIRECTLY TO THE EMERGENCY ROOM.  MD Andrea Coleman MD  7/5/2022 11:15:57 AM  This report has been verified and signed electronically.  Dear patient,  As a result of recent federal legislation (The Federal Cures Act), you may   receive lab or pathology results from your procedure in your [facility]   account before your physician is able to contact you. Your physician or   their representative will relay the results to you with their   recommendations at their soonest availability.  Thank you,  [employer]

## 2022-07-05 NOTE — ANESTHESIA POSTPROCEDURE EVALUATION
Anesthesia Post Evaluation    Patient: Gustavo Perry    Procedure(s) Performed: Procedure(s) (LRB):  EGD (ESOPHAGOGASTRODUODENOSCOPY) (N/A)    Final Anesthesia Type: general      Patient location during evaluation: PACU  Patient participation: Yes- Able to Participate  Level of consciousness: awake and awake and alert  Post-procedure vital signs: reviewed and stable  Pain management: adequate  Airway patency: patent    PONV status at discharge: No PONV  Anesthetic complications: no      Cardiovascular status: blood pressure returned to baseline  Respiratory status: unassisted and spontaneous ventilation  Hydration status: euvolemic  Follow-up not needed.          Vitals Value Taken Time   /84 07/05/22 1138   Temp 36.6 °C (97.8 °F) 07/05/22 1108   Pulse 55 07/05/22 1139   Resp 19 07/05/22 1139   SpO2 100 % 07/05/22 1139   Vitals shown include unvalidated device data.      Event Time   Out of Recovery 11:39:00         Pain/Marcos Score: Marcos Score: 10 (7/5/2022 11:38 AM)  Modified Marcos Score: 19 (7/5/2022 11:52 AM)

## 2022-07-05 NOTE — ANESTHESIA PREPROCEDURE EVALUATION
07/05/2022  Gustavo Perry is a 27 y.o., female.      Pre-op Assessment    I have reviewed the Patient Summary Reports.     I have reviewed the Nursing Notes.    I have reviewed the Medications.     Review of Systems  Anesthesia Hx:  No problems with previous Anesthesia  Neg history of prior surgery. Denies Family Hx of Anesthesia complications.   Denies Personal Hx of Anesthesia complications.   Social:  Smoker    Hematology/Oncology:  Hematology Normal   Oncology Normal     EENT/Dental:EENT/Dental Normal   Cardiovascular:  Cardiovascular Normal     Pulmonary:  Pulmonary Normal    Renal/:  Renal/ Normal     Hepatic/GI:   GERD, poorly controlled    Musculoskeletal:  Musculoskeletal Normal    Neurological:  Neurology Normal    Endocrine:  Endocrine Normal    Dermatological:  Skin Normal    Psych:   Psychiatric History Bipolar 1         Physical Exam  General: Alert    Airway:  Mallampati: II   Mouth Opening: Normal  TM Distance: Normal  Neck ROM: Normal ROM    Dental:  Intact    Chest/Lungs:  Clear to auscultation, Normal Respiratory Rate    Heart:  Rate: Normal    Abdomen:  Normal        Anesthesia Plan  Type of Anesthesia, risks & benefits discussed:    Anesthesia Type: Gen ETT  Post Op Pain Control Plan: multimodal analgesia  Airway Plan: Direct, Post-Induction  Informed Consent: Informed consent signed with the Patient and all parties understand the risks and agree with anesthesia plan.  All questions answered. Patient consented to blood products? No  ASA Score: 2  Day of Surgery Review of History & Physical: H&P Update referred to the surgeon/provider.    Ready For Surgery From Anesthesia Perspective.     .

## 2022-07-05 NOTE — DISCHARGE INSTRUCTIONS

## 2022-07-05 NOTE — BRIEF OP NOTE
Procedure.  Esophageal gastroduodenoscopy.  Indications dyspepsia pyrosis nausea right upper quadrant pain with an abnormal upper GI x-ray showing possibly duodenitis or duodenal ulcer.  She has good health knowledge and she understands the procedures upper endoscopy.  Specifically she realizes there is an extremely small incidence of bleeding perforation or aspiration.  Anesthesia.  Monitored anesthesia coverage.  Procedure.  With the patient the procedure room left lateral supine position.  The Olympus video upper endoscope was passed without difficulty.  The mildly hyperemic gastroesophageal junction was at 40-41 cm the SQ junction at 40-41 cm.  The scope was passed in the stomach and retroflexed.  The cardia body and antrum was mildly hyperemic.  There was minimal retained secretions.  The friable pre-pyloric mucosa was biopsied.  Biopsies were limited because of the easy bleeding.  There was minimal deformity of the pylorus.  There was mild hyperemia  Of the duodenal bulb and biopsies were obtained.  A definite ulcer was not identified.  Patient tolerated the procedure well.  Impression 1. Esophagitis LA grade A 2. Mild gastritis 3. Mild duodenitis.

## 2022-07-05 NOTE — DISCHARGE SUMMARY
[facility] Surgery  Discharge Note  Short Stay    Procedure(s) (LRB):  EGD (ESOPHAGOGASTRODUODENOSCOPY) (N/A)    OUTCOME: Patient tolerated treatment/procedure well without complication and is now ready for discharge.    DISPOSITION: [facility] [place]    FINAL DIAGNOSIS:  <principal problem not specified>    FOLLOWUP: In clinic    DISCHARGE INSTRUCTIONS:  No discharge procedures on file.      upper endoscopy revealed gastroesophageal reflux.  There was mild duodenitis and biopsies were obtained.  I have encouraged weight reduction and she will make a food diary and avoid the offending foods particularly the fried and the fatty foods.  She is scheduled for cholecystectomy and will follow-up with the surgeon and in office.  She continues her current medications vitamins and minerals.

## 2022-07-05 NOTE — H&P
"    Office Visit    6/13/2022  [facility]     Andrea Manning MD      Gastroenterology  Iron deficiency anemia, unspecified iron deficiency anemia type +4 more      Dx  Abdominal Pain   Other ; Referred by Wale James MD      Reason for Visit       Progress Notes  Andrea Manning MD (Physician)   Gastroenterology     Subjective:       Patient ID: Gustavo Perry is a 27 y.o. female.     Chief Complaint: Abdominal Pain (Sharp pains "by gallbladder") and Other (Nausea before & after eating)     For at least 2 months she has experienced nausea.  She has had pyrosis.  She has tried to decrease her oral intake because she does not want to vomit.  She does not associate her symptoms with a specific food.  She denies a precipitating factor.  The ultrasound shows a contracted gallbladder but the CT scan was normal.  Her mother recently had a cholecystectomy.  An uncle and grandfather had Pineda's disease, esophageal stomach and liver cancer.  She denies dysphagia aspiration.  She has 4 children with her last delivery year ago.  Her children are living and well.  She appears to have iron deficiency with a decreased MCV. She has menorrhagia and has not followed up with her gynecologist but is going to do so.  She denies hematemesis hematochezia jaundice or bleeding.  She has complained of nonspecific right upper quadrant epigastric discomfort which she is difficult to describe but associated with nausea.        Allergies:       Review of patient's allergies indicates:   Allergen Reactions    Sulfa (sulfonamide antibiotics) Hives         Medications:     Current Outpatient Medications:     ibuprofen (ADVIL,MOTRIN) 200 MG tablet, Take 200 mg by mouth every 6 (six) hours as needed for Pain., Disp: , Rfl:     ondansetron (ZOFRAN-ODT) 4 MG TbDL, Take 1 tablet (4 mg total) by mouth every 6 (six) hours as needed (nausea or vomiting). (Patient not taking: Reported on 6/13/2022), Disp: 30 tablet, " Rfl: 1          Past Medical History:   Diagnosis Date    Anxiety      Bipolar 1 disorder, depressed      Depression      PTSD (post-traumatic stress disorder)                 Past Surgical History:   Procedure Laterality Date     SECTION        TONSILLECTOMY, ADENOIDECTOMY        TUBAL LIGATION        WISDOM TOOTH EXTRACTION                Review of Systems   Constitutional: Negative for appetite change, fever and unexpected weight change.   HENT: Negative for trouble swallowing.         No jaundice.   Respiratory: Negative for cough, shortness of breath and wheezing.         She is a daily cigarette smoker.  She has been offered the smoking cessation program in the past.  She denies alcohol consumption.  She denies dysphagia aspiration hemoptysis chronic cough chronic sputum production or dyspnea on exertion.   Cardiovascular: Negative for chest pain.        She denies exertional chest pain or rhythm disturbance.   Gastrointestinal: Positive for abdominal pain and nausea. Negative for abdominal distention, anal bleeding, blood in stool, constipation, diarrhea and rectal pain.          CT Abdomen Pelvis  Without Contrast     Status: Final result     MyChart Results Release     [employer] Status: Active         Results Release        PACS Images for Vi[facility] [place] Viewer      Show images for CT Abdomen Pelvis Without Contrast     CT Abdomen Pelvis  Without Contrast  Order: [id number]  Status: Final result       Visible to patient: Yes (seen)       Next appt: 2022 at 08:45 AM in General Surgery (Eric Huston MD)       0 Result Notes        Details        Reading Physician    Reading Date Result Priority  Deangelo Torres MD  [phone]  2022        STAT     Narrative & Impression  EXAMINATION:  CT ABDOMEN PELVIS WITHOUT CONTRAST     CLINICAL HISTORY:  Abdominal pain, acute, nonlocalized;     TECHNIQUE:  Low dose axial images, sagittal and coronal reformations were obtained from  the lung bases to the pubic symphysis.  Oral contrast was not administered.     COMPARISON:  CT 06/28/2017.     FINDINGS:  The liver and spleen are normal in size and attenuation.  The gallbladder, pancreas and adrenal glands are unremarkable.     Kidneys are normal in size and attenuation.  No renal calculi.  No changes of hydronephrosis.  No perinephric inflammatory change.     Air and stool throughout the loops of colon.  No mesenteric inflammatory change.  No CT evidence for bowel obstruction.  Appendix is normal in caliber.     The bladder is decompressed.  Uterus and ovaries are normal in size and attenuation.  No free fluid in cul-de-sac.     No significant mesenteric or retroperitoneal lymphadenopathy.  There is a small fat containing umbilical hernia.     Impression:     1. No renal calculi.  2. Small fat containing umbilical hernia.        Electronically signed by: Deangelo Torres  Date:                                            05/16/2022  Time:                                           13:56                 Exam Ended: 05/16/22 13:35           Last Resulted: 05/16/22 13:56      Order Details        View Encounter        Lab and Collection Details        Routing        Result History                Result Care Coordination           Patient Communication         Add Comments         Seen   Back to Top              Other Results from 5/16/2022         Contains abnormal data Comprehensive Metabolic Panel (CMP)  Order: [id number]  Status: Final result       Visible to patient: Yes (seen)       Next appt: 06/23/2022 at 08:45 AM in General Surgery (Eric Huston MD)       0 Result Notes        Component    Ref Range & Units    4 wk ago         2 mo ago        3 mo ago        1 yr ago  Sodium           136 - 145 mmol/L       140      140      137      135 Low    Potassium      3.5 - 5.1 mmol/L         3.7       4.1       3.2 Low           3.5   Chloride         95 - 110 mmol/L         108      107       104      107   CO2     23 - 29 mmol/L           25        23        24        19 Low    Glucose          70 - 110 mg/dL           85        86        106      96   BUN    6 - 20 mg/dL   9          12        10        8   Creatinine      0.5 - 1.4 mg/dL           0.9       0.9       0.9       0.6   Calcium          8.7 - 10.5 mg/dL         9.1       9.3       9.1       9.0   Total Protein  6.0 - 8.4 g/dL  7.1       7.5       7.4       6.7   Albumin          3.5 - 5.2 g/dL  3.8       3.8       4.2       3.5   Total Bilirubin            0.1 - 1.0 mg/dL           0.2       0.2 CM            0.4 CM            0.1 CM   Comment: For infants and newborns, interpretation of results should be based   on gestational age, weight and in agreement with clinical   observations.      Premature Infant recommended reference ranges:   Up to 24 hours.............<8.0 mg/dL   Up to 48 hours............<12.0 mg/dL   3-5 days..................<15.0 mg/dL   6-29 days.................<15.0 mg/dL   Alkaline Phosphatase          55 - 135 U/L    77        73        75        59   AST     10 - 40 U/L      16        12        14        12   ALT     10 - 44 U/L      18        12        16        12   Anion Gap      8 - 16 mmol/L 7 Low              10        9          9   eGFR if African American    >60 mL/min/1.73 m^2           >60.0   >60.0   >60.0   >60.0   eGFR if non African American        >60 mL/min/1.73 m^2           >60.0   >60.0 CM        >60.0 CM      >60.0 CM   Comment: Calculation used to obtain the estimated glomerular filtration   rate (eGFR) is the CKD-EPI equation.   Resulting Agency                 [facility]           [facility]           [facility]  [facility]                 Specimen Collected: 05/16/22 13:32           Last Resulted: 05/16/22 14:21       Lab Flowsheet        Order Details        View Encounter        Lab and Collection Details        Routing        Result History          CM=Additional comments          Result Care  Coordination           Patient Communication         Add Comments         Seen   Back to Top                  Contains abnormal data Complete Blood Count (CBC)  Order: [id number]  Status: Final result       Visible to patient: Yes (seen)       Next appt: 06/23/2022 at 08:45 AM in General Surgery (Eric Huston MD)       0 Result Notes        Component    Ref Range & Units    4 wk ago   (5/16/22)          2 mo ago   (4/1/22)            3 mo ago   (3/15/22)          1 yr ago   (12/23/20)        1 yr ago   (12/22/20)        1 yr ago   (6/25/20)  WBC    3.90 - 12.70 K/uL        6.46     5.57     7.01     8.66     8.31     7.10   RBC    4.00 - 5.40 M/uL         4.52     4.87     4.69     3.30 Low         4.07     4.20   Hemoglobin   12.0 - 16.0 g/dL          11.9 Low         12.8     12.4     9.4 Low           11.3 Low            12.5   Hematocrit     37.0 - 48.5 %   36.0 Low         40.1     37.5     29.2 Low         35.3 Low         35.8 Low    MCV    82 - 98 fL        80 Low            82        80 Low            89        87        85   MCH    27.0 - 31.0 pg 26.3 Low         26.3 Low         26.4 Low         28.5     27.8     29.8   MCHC 32.0 - 36.0 g/dL          33.1     31.9 Low         33.1     32.2     32.0     34.9   RDW    11.5 - 14.5 %   14.9 High        14.5     14.9 High        13.3     13.4     12.7   Platelets         150 - 450 K/uL            240      278      245      223 R   255 R   195 R   MPV    9.2 - 12.9 fL    10.8     10.1     10.6     11.0     11.0     10.3   Immature Granulocytes       0.0 - 0.5 %       0.3       0.2       0.3       0.3       0.2       0.3   Gran # (ANC) 1.8 - 7.7 K/uL  3.1       2.6       4.9       6.0       5.2       4.1   Immature Grans (Abs)          0.00 - 0.04 K/uL          0.02     0.01 CM          0.02 CM          0.03 CM      0.02 CM          0.02 CM   Comment: Mild elevation in immature granulocytes is non specific and   can be seen in a variety of conditions  including stress response,   acute inflammation, trauma and pregnancy. Correlation with other   laboratory and clinical findings is essential.   Lymph #         1.0 - 4.8 K/uL  3.0       2.5       1.5       2.1       2.6       2.5   Mono #           0.3 - 1.0 K/uL  0.3       0.3       0.4       0.5       0.5       0.4   Eos #   0.0 - 0.5 K/uL  0.1       0.2       0.1       0.1       0.1       0.1   Baso #            0.00 - 0.20 K/uL          0.03     0.03     0.05     0.01     0.03     0.02   nRBC  0 /100 WBC    0          0          0          0          0          0   Gran %           38.0 - 73.0 %   47.7     46.3     69.1     69.0     62.2     57.3   Lymph %        18.0 - 48.0 %   46.6     44.9     22.0     23.8     30.7     35.8   Mono %          4.0 - 15.0 %     4.0       5.2       6.3       6.2       5.8       5.5   Eosinophil % 0.0 - 8.0 %       0.9       2.9       1.6       0.6       0.7       0.8   Basophil %     0.0 - 1.9 %       0.5       0.5       0.7       0.1       0.4       0.3   Differential Method               Automated     Automated     Automated     Automated     Automated            Automated   Resulting Agency                 [facility]           [facility]           [facility]  [facility]  [facility]            [facility]                 Specimen Collected: 05/16/22 13:32           Last Resulted: 05/16/22 13:45       Lab Flowsheet        Order Details        View Encounter        Lab and Collection Details        Routing        Result History          CM=Additional comments  R=Reference range differs from displayed range          Result Care Coordination           Patient Communication         Add Comments         Seen   Back to Top                  Contains abnormal data Urinalysis, Reflex to Urine Culture Urine, Clean Catch  Order: [id number]  Status: Final result       Visible to patient: Yes (seen)       Next appt: 06/23/2022 at 08:45 AM in General Surgery (Eric Huston MD)       Specimen  Information:         Urine, Clean Catch                0 Result Notes        Component    Ref Range & Units    4 wk ago   (5/16/22)          6 mo ago   (11/30/21)        1 yr ago   (12/22/20)        2 yr ago   (4/17/20)          2 yr ago   (11/13/19)  Specimen UA             Urine, Unspecified    Urine, Catheterized   Urine, Clean Catch    Urine, Clean Catch  Urine, Clean Catch   Color, UA       Yellow, Straw, Tatiana           Yellow             Yellow             Yellow             Yellow            Yellow   Appearance, UA        Clear   Clear   Clear   Hazy Abnormal          Clear   Clear   pH, UA            5.0 - 8.0           6.0       6.0       7.0       6.0       5.0   Specific Gravity, UA 1.005 - 1.030   1.025   >=1.030 Abnormal     1.025   1.015   1.025   Protein, UA    Negative         Negative         Negative CM  Trace Abnormal  CM             Negative CM      Trace Abnormal  CM   Comment: Recommend a 24 hour urine protein or a urine   protein/creatinine ratio if globulin induced proteinuria is   clinically suspected.   Glucose, UA   Negative         Negative         Negative         Negative         Negative         Negative   Ketones, UA   Negative         Negative         Negative         Negative         Negative         Negative   Bilirubin (UA) Negative         Negative         Negative         Negative         Negative         Negative   Occult Blood UA       Negative         Trace Abnormal        Trace Abnormal        Negative            Negative         3+ Abnormal    Nitrite, UA      Negative         Negative         Negative         Negative         Negative         Negative   Urobilinogen, UA      Negative EU/dL         Negative         Negative         Negative         Negative            Negative   Leukocytes, UA         Negative         Negative         Negative         Trace Abnormal        Trace Abnormal      Negative   Resulting Agency                 [facility]           [facility]            [facility]  [facility]            [facility]                 Narrative  Performed by: [facility]     Preferred Collection Type->Urine, Clean Catch   Specimen Source->Urine     Specimen Collected: 05/16/22 12:49           Last Resulted: 05/16/22 13:11       Lab Flowsheet        Order Details        View Encounter        Lab and Collection Details        Routing        Result History          CM=Additional comments          Result Care Coordination           Patient Communication         Add Comments         Seen   Back to Top                 Pregnancy, urine rapid  Order: [id number]  Status: Final result       Visible to patient: Yes (seen)       Next appt: 06/23/2022 at 08:45 AM in General Surgery (Eric Huston MD)       0 Result Notes        Component    4 wk ago   Preg Test, Ur  Negative   Resulting Agency     [facility]                 Narrative  Performed by: [facility]     Specimen Source->Urine     Specimen Collected: 05/16/22 12:49           Last Resulted: 05/16/22 13:11       Lab Flowsheet        Order Details        View Encounter        Lab and Collection Details        Routing        Result History                Result Care Coordination           Patient Communication         Add Comments         Seen   Back to Top                 CT Abdomen Pelvis  Without Contrast: Patient Communication      Add Comments         Seen        External Result Report     External Result Report     Narrative & Impression        EXAMINATION:  CT ABDOMEN PELVIS WITHOUT CONTRAST     CLINICAL HISTORY:  Abdominal pain, acute, nonlocalized;     TECHNIQUE:  Low dose axial images, sagittal and coronal reformations were obtained from the lung bases to the pubic symphysis.  Oral contrast was not administered.     COMPARISON:  CT 06/28/2017.     FINDINGS:  The liver and spleen are normal in size and attenuation.  The gallbladder, pancreas and adrenal glands are unremarkable.     Kidneys are normal in size and attenuation.  No  renal calculi.  No changes of hydronephrosis.  No perinephric inflammatory change.     Air and stool throughout the loops of colon.  No mesenteric inflammatory change.  No CT evidence for bowel obstruction.  Appendix is normal in caliber.     The bladder is decompressed.  Uterus and ovaries are normal in size and attenuation.  No free fluid in cul-de-sac.     No significant mesenteric or retroperitoneal lymphadenopathy.  There is a small fat containing umbilical hernia.     Impression:     1. No renal calculi.  2. Small fat containing umbilical hernia.        Electronically signed by: Deangelo Torres  Date:                                            05/16/2022  Time:                                           13:56                   Encounter        View Encounter              Signed by     Signed            Time    Phone Pager  Deangelo Torres MD         5/16/2022 13:56          [phone]       Exam Details     Performed Procedure           Technologist  Supporting Staff        Performing Physician  CT Abdomen Pelvis  Without Contrast      Albert Carranza, RT                                Appointment Date/Status     Modality         Department      5/16/2022     Completed        [facility] CT1      [facility] CT SCAN                Begin Exam   End Exam       Begin Exam Questionnaires            End Exam Questionnaires  5/16/2022  1:35 PM    5/16/2022  1:35 PM    [facility] PREGNANCY TECH NAVIGATOR        IMAGING END ALL           Reason for Exam  Priority: STAT  Abdominal pain, acute, nonlocalized     Order Report      Order Details        US Abdomen Complete     Status: Final result           MyChart Results Release     [email or web address] Status: Active         Results Release           US Abdomen Complete: Result Notes                 Wale James MD   5/31/2022  8:25 AM CDT               Discussed via portal on 5/31/22. Referred to surgery              PACS Images for Vi[facility]AL [place] Viewer      Show images for US Abdomen  Complete                       All Reviewers List     Wale James MD on 5/31/2022 08:25        US Abdomen Complete  Order: [id number]  Status: Final result    Visible to patient: Yes (seen)    Next appt: 06/23/2022 at 08:45 AM in General Surgery (Eric Huston MD)    Dx: RUQ abdominal pain    1 Result Note     Details        Reading Physician    Reading Date Result Priority  Minor Millan Jr., MD  [phone]  5/26/2022        Routine     Narrative & Impression  EXAMINATION:  US ABDOMEN COMPLETE     CLINICAL HISTORY:  Right upper quadrant pain     TECHNIQUE:  Complete abdominal ultrasound (including pancreas, aorta, liver, gallbladder, common bile duct, IVC, kidneys, and spleen) was performed.     COMPARISON:  None     FINDINGS:  Pancreas: The visualized portions of pancreas appear normal.     Aorta: No aneurysm.     Liver: 15.5 cm, normal in size. Homogeneous parenchymal echotexture. No focal lesions.     Gallbladder: Significantly contracted.  Shadowing stone is not identified but the imaging is suboptimal.  Negative sonographic Xie's sign.     Biliary system: 2.3 mm common bile duct.  No intrahepatic ductal dilatation.     Inferior vena cava: Normal in appearance.     Right kidney: 11.7 cm. No hydronephrosis.     Left kidney: 11.9 cm. Nine     Spleen: 9.7 cm.  Normal in size with homogeneous echotexture.     Miscellaneous: No ascites.     Impression:     Suboptimal imaging of the gallbladder due to significant contraction.  Otherwise negative abdomen ultrasound        Electronically signed by: Minor Millan MD  Date:                                            05/26/2022  Time:                                           11:13              Exam Ended: 05/26/22 09:48           Last Resulted: 05/26/22 11:13  Ryan as an Unsuccessful Attempt     Order Details    View Encounter    Lab and Collection Details    Routing    Result History             Result Care Coordination        Result  Notes                    Wale James MD   5/31/2022  8:25 AM CDT       Back to Top        Discussed via portal on 5/31/22. Referred to surgery     Patient Communication        Add Comments         Seen    Back to Top                       US Abdomen Complete: Patient Communication     Add Comments         Seen        External Result Report     External Result Report        Narrative & Impression        EXAMINATION:  US ABDOMEN COMPLETE     CLINICAL HISTORY:  Right upper quadrant pain     TECHNIQUE:  Complete abdominal ultrasound (including pancreas, aorta, liver, gallbladder, common bile duct, IVC, kidneys, and spleen) was performed.     COMPARISON:  None     FINDINGS:  Pancreas: The visualized portions of pancreas appear normal.     Aorta: No aneurysm.     Liver: 15.5 cm, normal in size. Homogeneous parenchymal echotexture. No focal lesions.     Gallbladder: Significantly contracted.  Shadowing stone is not identified but the imaging is suboptimal.  Negative sonographic Xie's sign.     Biliary system: 2.3 mm common bile duct.  No intrahepatic ductal dilatation.     Inferior vena cava: Normal in appearance.     Right kidney: 11.7 cm. No hydronephrosis.     Left kidney: 11.9 cm. Nine     Spleen: 9.7 cm.  Normal in size with homogeneous echotexture.     Miscellaneous: No ascites.     Impression:     Suboptimal imaging of the gallbladder due to significant contraction.  Otherwise negative abdomen ultrasound        Electronically signed by: Minor Millan MD  Date:                                            05/26/2022  Time:                                           11:13                Encounter        View Encounter                    Signed by     Signed            Time    Phone Pager  Minor Millan Jr., MD 5/26/2022 11:13          [phone]       Reviewed By     Wale James MD on 5/31/2022 08:25              Exam Details     Performed Procedure           Technologist  Supporting Staff         Performing Physician  US Abdomen Complete        Maria Elena Rebollar RDMS                       Appointment Date/Status     Modality         Department      5/26/2022     Completed        [facility] US 2    [facility] ULTRASOUND                   Begin Exam   End Exam       Begin Exam Questionnaires            End Exam Questionnaires  5/26/2022  9:11 AM    5/26/2022  9:48 AM    RIS PREGNANCY TECH NAVIGATOR        IMAGING END ALL              Reason for Exam  Priority: Routine  Dx: RUQ abdominal pain (R10.11 (ICD-10-CM))           Order Report     Order Details                 Musculoskeletal: Negative for back pain and neck pain.   Skin: Negative for pallor and rash.   Neurological: Negative for dizziness, seizures, syncope, speech difficulty, weakness and numbness.   Hematological: Negative for adenopathy.   Psychiatric/Behavioral: Negative for confusion.       Objective:   Physical Exam  Vitals reviewed.   Constitutional:       Appearance: She is well-developed.      Comments: Well-nourished well-hydrated overweight afebrile nonicteric white female.  She is sitting comfortably in the chair in breathing normally.  She appears to be oriented x3 and can relate her history and answer questions appropriately.  She is normocephalic.  Pupils are normal.   HENT:      Head: Normocephalic.   Eyes:      Pupils: Pupils are equal, round, and reactive to light.   Neck:      Thyroid: No thyromegaly.      Trachea: No tracheal deviation.   Cardiovascular:      Rate and Rhythm: Normal rate and regular rhythm.      Heart sounds: Normal heart sounds.   Pulmonary:      Effort: Pulmonary effort is normal.      Breath sounds: Normal breath sounds.   Abdominal:      General: Bowel sounds are normal. There is no distension.      Palpations: Abdomen is soft. There is no mass.      Tenderness: There is abdominal tenderness. There is no right CVA tenderness, left CVA tenderness, guarding or rebound.      Hernia: No hernia is present.      Comments: The  abdomen is soft is mildly obese with mild tenderness to palpation in the epigastrium right upper quadrant.  Organomegaly or masses are not detected.  Bowel sounds are normal.   Musculoskeletal:         General: Normal range of motion.      Cervical back: Normal range of motion and neck supple.      Comments: She can ambulate normally.  She can go from the sitting to the standing position without difficulty.  She can get on the exam table without difficulty or assistance.   Lymphadenopathy:      Cervical: No cervical adenopathy.   Skin:     General: Skin is warm and dry.   Neurological:      Mental Status: She is alert and oriented to person, place, and time.      Cranial Nerves: No cranial nerve deficit.   Psychiatric:         Behavior: Behavior normal.             Plan:       Iron deficiency anemia, unspecified iron deficiency anemia type  -     Iron and TIBC; Future; Expected date: 06/13/2022     Nausea  -     Ambulatory referral/consult to Gastroenterology     Upper abdominal pain, unspecified  -     NM Hepatobiliary Scan W Pharm Intervention; Future; Expected date: 06/13/2022     Gastroesophageal reflux disease, unspecified whether esophagitis present     Obese abdomen     She will continue her reflux regimen current medications vitamins and minerals.  Evaluation of her anemia is in progress.  A biliary scan and is scheduled.  She will be scheduled for upper endoscopy.  She has good health knowledge and she understands the procedure.  Specifically she realizes there is an extremely small incidence of bleeding perforation or aspiration.  She follows up with her gynecologist for evaluation of the menorrhagia.                   Other Notes     All notes        Instructions         Follow up in about 1 month (around 7/13/2022).    She will continue her nutritious diet.  Upper endoscopy, biliary scan and evaluation by her gynecologist for menorrhagia is in progress.  She has iron deficiency.               After Visit  "Summary (Automatic SnapShot taken 6/13/2022)      Additional Documentation    Vitals:  /80 (BP Location: Left arm, Patient Position: Sitting, BP Method: Medium (Automatic))     Pulse 83     Ht 5' 7" (1.702 m)     Wt 90.9 kg (200 lb 8 oz)     LMP 06/07/2022 (Exact Date)     SpO2 98%     BMI 31.40 kg/m²     BSA 2.07 m²     Pain Sc 0-No pain     Flowsheets:  Anthropometrics        SmartForms:   COURTESY CALL DOCUMENTATION       [facility] AMB - FALL RISK        Encounter Info:  Billing Info,     History,     Allergies,     Detailed Report          Communications         AMB Visit Summary: Provider Version sent to Wale James MD      Not recorded      All Charges for This Encounter    Code Description Service Date Service Provider Modifiers Qty   19905 [place] OFFICE/OUTPT VISIT, RAMANCHASITYL IV, 45-59 MIN 6/13/2022 Andrea Manning MD S$GLB 1   [id number] OH PBB SHADOW E&M-EST. PATIENT-LVL IV 6/13/2022 Andrea Manning MD PBBFAC 1   3079F OH MOST RECENT DIASTOLIC BLOOD PRESSURE 80-89 MM HG 6/13/2022 Andrea Manning MD S$GLB 1   3074F OH MOST RECENT SYSTOLIC BLOOD PRESSURE < 130 MM HG 6/13/2022 Andrea Manning MD S$GLB 1   3044F OH MOST RECENT HEMOGLOBIN A1C LEVEL <7.0% 6/13/2022 Andrea Manning MD S$GLB 1   3008F OH BODY MASS INDEX (BMI) DOCUMENTED 6/13/2022 Andrea Manning MD S$GLB 1   1159F OH MEDICATION LIST DOCUMENTED IN MEDICAL RECORD 6/13/2022 Andrea Manning MD S$GLB 1   1160F OH REVIEW ALL MEDS BY PRESCRIBER/CLIN PHARMACIST DOCUMENTED 6/13/2022 Andrea Manning MD S$GLB 1     Level of Service    Level of Service   OH OFFICE/OUTPT VISIT, RAMAN LEVL IV, 45-59 MIN [19744]     BestPractice Advisories    Click to view BestPractice Advisory history     AVS Reports    Date/Time Report Action User   6/13/2022 11:51 AM After Visit Summary Automatically Generated Andrea Manning MD     Encounter-Level Documents on 06/13/2022:    After Visit Summary - Document on 6/13/2022 11:51 AM by Andrea Manning MD: After Visit Summary      Orders " Placed         Iron and TIBC         NM Hepatobiliary Scan W Pharm Intervention        Other Orders Performed     Ambulatory referral/consult to Gastroenterology Closed      Medication Changes         None       Medication List     Visit Diagnoses         Iron deficiency anemia, unspecified iron deficiency anemia type         Nausea         Upper abdominal pain, unspecified         Gastroesophageal reflux disease, unspecified whether esophagitis present         Obese abdomen       Problem List        she is here for upper endoscopy.  She has had pyrosis and dyspepsia nausea.  She is scheduled for cholecystectomy.  She has good health knowledge and she understands the procedures of upper endoscopy.  Specifically she realizes there is an extremely small incidence of bleeding perforation or aspiration.  History and physical is unchanged.  Physical examination.  Well-nourished well-hydrated white female.  She is normocephalic.  Pupils are normal.  Lungs reveal symmetrical respirations at rales rhonchi.  Heart reveals regular rhythm.  The abdomen is soft with mild tenderness in the epigastrium.  Organomegaly masses are not detected.  Bowel sounds are normal.  Neurologic reveals that she is oriented x3.

## 2022-07-05 NOTE — TRANSFER OF CARE
"Anesthesia Transfer of Care Note    Patient: Gustavo Perry    Procedure(s) Performed: Procedure(s) (LRB):  EGD (ESOPHAGOGASTRODUODENOSCOPY) (N/A)    Patient location: PACU    Anesthesia Type: general    Transport from OR: Transported from OR on room air with adequate spontaneous ventilation    Post pain: adequate analgesia    Post assessment: no apparent anesthetic complications and tolerated procedure well    Post vital signs: stable    Level of consciousness: awake, alert and oriented    Nausea/Vomiting: no nausea/vomiting    Complications: none    Transfer of care protocol was followed      Last vitals:   Visit Vitals  /82   Pulse 63   Temp 36.6 °C (97.8 °F) (Oral)   Resp 12   Ht 5' 7" (1.702 m)   Wt 93 kg (205 lb)   LMP 06/07/2022 (Exact Date)   SpO2 100%   Breastfeeding No   BMI 32.11 kg/m²     "

## 2022-07-06 ENCOUNTER — OFFICE VISIT (OUTPATIENT)
Dept: FAMILY MEDICINE | Facility: CLINIC | Age: 27
End: 2022-07-06
Payer: COMMERCIAL

## 2022-07-06 VITALS
OXYGEN SATURATION: 99 % | HEART RATE: 69 BPM | SYSTOLIC BLOOD PRESSURE: 116 MMHG | RESPIRATION RATE: 16 BRPM | WEIGHT: 205.38 LBS | TEMPERATURE: 98 F | BODY MASS INDEX: 32.24 KG/M2 | DIASTOLIC BLOOD PRESSURE: 72 MMHG | HEIGHT: 67 IN

## 2022-07-06 DIAGNOSIS — H61.23 BILATERAL HEARING LOSS DUE TO CERUMEN IMPACTION: Primary | ICD-10-CM

## 2022-07-06 PROCEDURE — 1159F MED LIST DOCD IN RCRD: CPT | Mod: S$GLB,,, | Performed by: FAMILY MEDICINE

## 2022-07-06 PROCEDURE — 3044F PR MOST RECENT HEMOGLOBIN A1C LEVEL <7.0%: ICD-10-PCS | Mod: S$GLB,,, | Performed by: FAMILY MEDICINE

## 2022-07-06 PROCEDURE — 99999 PR PBB SHADOW E&M-EST. PATIENT-LVL III: CPT | Mod: PBBFAC,,, | Performed by: FAMILY MEDICINE

## 2022-07-06 PROCEDURE — 1159F PR MEDICATION LIST DOCUMENTED IN MEDICAL RECORD: ICD-10-PCS | Mod: S$GLB,,, | Performed by: FAMILY MEDICINE

## 2022-07-06 PROCEDURE — 3008F BODY MASS INDEX DOCD: CPT | Mod: S$GLB,,, | Performed by: FAMILY MEDICINE

## 2022-07-06 PROCEDURE — 99999 PR PBB SHADOW E&M-EST. PATIENT-LVL III: ICD-10-PCS | Mod: PBBFAC,,, | Performed by: FAMILY MEDICINE

## 2022-07-06 PROCEDURE — 1160F PR REVIEW ALL MEDS BY PRESCRIBER/CLIN PHARMACIST DOCUMENTED: ICD-10-PCS | Mod: S$GLB,,, | Performed by: FAMILY MEDICINE

## 2022-07-06 PROCEDURE — 3078F DIAST BP <80 MM HG: CPT | Mod: S$GLB,,, | Performed by: FAMILY MEDICINE

## 2022-07-06 PROCEDURE — 99214 OFFICE O/P EST MOD 30 MIN: CPT | Mod: S$GLB,,, | Performed by: FAMILY MEDICINE

## 2022-07-06 PROCEDURE — 3008F PR BODY MASS INDEX (BMI) DOCUMENTED: ICD-10-PCS | Mod: S$GLB,,, | Performed by: FAMILY MEDICINE

## 2022-07-06 PROCEDURE — 1160F RVW MEDS BY RX/DR IN RCRD: CPT | Mod: S$GLB,,, | Performed by: FAMILY MEDICINE

## 2022-07-06 PROCEDURE — 99214 PR OFFICE/OUTPT VISIT, EST, LEVL IV, 30-39 MIN: ICD-10-PCS | Mod: S$GLB,,, | Performed by: FAMILY MEDICINE

## 2022-07-06 PROCEDURE — 3078F PR MOST RECENT DIASTOLIC BLOOD PRESSURE < 80 MM HG: ICD-10-PCS | Mod: S$GLB,,, | Performed by: FAMILY MEDICINE

## 2022-07-06 PROCEDURE — 3074F SYST BP LT 130 MM HG: CPT | Mod: S$GLB,,, | Performed by: FAMILY MEDICINE

## 2022-07-06 PROCEDURE — 3044F HG A1C LEVEL LT 7.0%: CPT | Mod: S$GLB,,, | Performed by: FAMILY MEDICINE

## 2022-07-06 PROCEDURE — 3074F PR MOST RECENT SYSTOLIC BLOOD PRESSURE < 130 MM HG: ICD-10-PCS | Mod: S$GLB,,, | Performed by: FAMILY MEDICINE

## 2022-07-06 NOTE — PROGRESS NOTES
"[facility] - Clinic Note    Subjective      Ms. Perry is a 27 y.o. female who presents to clinic for Otalgia (bilateral)    Patient reports bilateral hearing loss for the last couple of days.  She went swimming on .  No pain in the ears.    [facility] Gustavo has a past medical history of Anxiety, Bipolar 1 disorder, depressed, Depression, and PTSD (post-traumatic stress disorder).   PSXH Gustavo has a past surgical history that includes TONSILLECTOMY, ADENOIDECTOMY; [place] tooth extraction; Tubal ligation; and  section.    Gustavo's family history includes Hypertension in her mother; No Known Problems in her father.    Gustavo reports that she has been smoking cigarettes. She has a 4.50 pack-year smoking history. She has never used smokeless tobacco. She reports previous alcohol use. She reports that she does not use drugs.   ALG Gustavo is allergic to sulfa (sulfonamide antibiotics).   [facility] Gustavo has a current medication list which includes the following prescription(s): iron-vitamin c 100-250 mg (icar-c) and ondansetron, and the following Facility-Administered Medications: ceFOXItin (MEFOXIN) 2 g in dextrose 5 % 50 mL IVPB.     Review of Systems   Constitutional: Negative for chills and fever.   HENT: Positive for hearing loss. Negative for ear pain.    Respiratory: Negative for shortness of breath.    Cardiovascular: Negative for chest pain.     Objective     /72 (BP Location: Right arm, Patient Position: Sitting, BP Method: Large (Manual))   Pulse 69   Temp 98.3 °F (36.8 °C) (Temporal)   Resp 16   Ht 5' 7" (1.702 m)   Wt 93.2 kg (205 lb 6.4 oz)   LMP 2022 (Exact Date)   SpO2 99%   BMI 32.17 kg/m²     Physical Exam  Vitals and nursing note reviewed.   Constitutional:       General: She is not in acute distress.     Appearance: Normal appearance. She is well-developed. She is not diaphoretic.   HENT:      Head: Normocephalic and atraumatic.      Right Ear: External ear normal. There is " impacted cerumen.      Left Ear: External ear normal. There is impacted cerumen.   Eyes:      General:         Right eye: No discharge.         Left eye: No discharge.   Cardiovascular:      Rate and Rhythm: Normal rate and regular rhythm.      Heart sounds: Normal heart sounds.   Pulmonary:      Effort: Pulmonary effort is normal.      Breath sounds: Normal breath sounds. No wheezing or rales.   Skin:     General: Skin is warm and dry.   Neurological:      Mental Status: She is alert and oriented to person, place, and time. Mental status is at baseline.   Psychiatric:         Mood and Affect: Mood normal.         Behavior: Behavior normal.         Thought Content: Thought content normal.         Judgment: Judgment normal.        Assessment/Plan     1. Bilateral hearing loss due to cerumen impaction       New patient and new problems to me.  Bilateral cerumen impaction.  Bilateral ears flushed by CHIQUI Ramirez.  Improvement in both sides.    Future Appointments   Date Time Provider Department Center   7/18/2022  9:00 AM Andrea Manning MD [facility] GASTR[facility]   7/27/2022 10:30 AM [facility] NM1 [facility]   8/11/2022 10:45 AM Eric Huston MD [facility]   4/4/2023 10:00 AM [facility]         Navin Norman MD  Family Medicine  [facility]

## 2022-07-07 ENCOUNTER — PATIENT MESSAGE (OUTPATIENT)
Dept: GASTROENTEROLOGY | Facility: CLINIC | Age: 27
End: 2022-07-07
Payer: COMMERCIAL

## 2022-07-11 ENCOUNTER — PATIENT MESSAGE (OUTPATIENT)
Dept: FAMILY MEDICINE | Facility: CLINIC | Age: 27
End: 2022-07-11
Payer: COMMERCIAL

## 2022-07-11 ENCOUNTER — PATIENT MESSAGE (OUTPATIENT)
Dept: ADMINISTRATIVE | Facility: HOSPITAL | Age: 27
End: 2022-07-11
Payer: COMMERCIAL

## 2022-07-11 ENCOUNTER — PATIENT OUTREACH (OUTPATIENT)
Dept: ADMINISTRATIVE | Facility: HOSPITAL | Age: 27
End: 2022-07-11
Payer: COMMERCIAL

## 2022-07-11 LAB
FINAL PATHOLOGIC DIAGNOSIS: NORMAL
GROSS: NORMAL
Lab: NORMAL
SUPPLEMENTAL DIAGNOSIS: NORMAL

## 2022-07-13 ENCOUNTER — OFFICE VISIT (OUTPATIENT)
Dept: INTERNAL MEDICINE | Facility: CLINIC | Age: 27
End: 2022-07-13
Payer: COMMERCIAL

## 2022-07-13 VITALS
HEART RATE: 112 BPM | BODY MASS INDEX: 32.18 KG/M2 | WEIGHT: 205 LBS | DIASTOLIC BLOOD PRESSURE: 82 MMHG | HEIGHT: 67 IN | TEMPERATURE: 98 F | OXYGEN SATURATION: 97 % | SYSTOLIC BLOOD PRESSURE: 131 MMHG

## 2022-07-13 DIAGNOSIS — S29.012A STRAIN OF THORACIC BACK REGION: Primary | ICD-10-CM

## 2022-07-13 PROCEDURE — 1159F PR MEDICATION LIST DOCUMENTED IN MEDICAL RECORD: ICD-10-PCS | Mod: S$GLB,,, | Performed by: PHYSICIAN ASSISTANT

## 2022-07-13 PROCEDURE — 3044F HG A1C LEVEL LT 7.0%: CPT | Mod: S$GLB,,, | Performed by: PHYSICIAN ASSISTANT

## 2022-07-13 PROCEDURE — 99204 OFFICE O/P NEW MOD 45 MIN: CPT | Mod: S$GLB,,, | Performed by: PHYSICIAN ASSISTANT

## 2022-07-13 PROCEDURE — 3075F PR MOST RECENT SYSTOLIC BLOOD PRESS GE 130-139MM HG: ICD-10-PCS | Mod: S$GLB,,, | Performed by: PHYSICIAN ASSISTANT

## 2022-07-13 PROCEDURE — 99204 PR OFFICE/OUTPT VISIT, NEW, LEVL IV, 45-59 MIN: ICD-10-PCS | Mod: S$GLB,,, | Performed by: PHYSICIAN ASSISTANT

## 2022-07-13 PROCEDURE — 3075F SYST BP GE 130 - 139MM HG: CPT | Mod: S$GLB,,, | Performed by: PHYSICIAN ASSISTANT

## 2022-07-13 PROCEDURE — 3044F PR MOST RECENT HEMOGLOBIN A1C LEVEL <7.0%: ICD-10-PCS | Mod: S$GLB,,, | Performed by: PHYSICIAN ASSISTANT

## 2022-07-13 PROCEDURE — 3079F PR MOST RECENT DIASTOLIC BLOOD PRESSURE 80-89 MM HG: ICD-10-PCS | Mod: S$GLB,,, | Performed by: PHYSICIAN ASSISTANT

## 2022-07-13 PROCEDURE — 1160F RVW MEDS BY RX/DR IN RCRD: CPT | Mod: S$GLB,,, | Performed by: PHYSICIAN ASSISTANT

## 2022-07-13 PROCEDURE — 3008F BODY MASS INDEX DOCD: CPT | Mod: S$GLB,,, | Performed by: PHYSICIAN ASSISTANT

## 2022-07-13 PROCEDURE — 3008F PR BODY MASS INDEX (BMI) DOCUMENTED: ICD-10-PCS | Mod: S$GLB,,, | Performed by: PHYSICIAN ASSISTANT

## 2022-07-13 PROCEDURE — 1159F MED LIST DOCD IN RCRD: CPT | Mod: S$GLB,,, | Performed by: PHYSICIAN ASSISTANT

## 2022-07-13 PROCEDURE — 1160F PR REVIEW ALL MEDS BY PRESCRIBER/CLIN PHARMACIST DOCUMENTED: ICD-10-PCS | Mod: S$GLB,,, | Performed by: PHYSICIAN ASSISTANT

## 2022-07-13 PROCEDURE — 3079F DIAST BP 80-89 MM HG: CPT | Mod: S$GLB,,, | Performed by: PHYSICIAN ASSISTANT

## 2022-07-13 RX ORDER — METHOCARBAMOL 750 MG/1
750 TABLET, FILM COATED ORAL 3 TIMES DAILY
Qty: 21 TABLET | Refills: 0 | Status: SHIPPED | OUTPATIENT
Start: 2022-07-13 | End: 2022-07-20

## 2022-07-13 RX ORDER — NAPROXEN 500 MG/1
500 TABLET ORAL 2 TIMES DAILY
Qty: 30 TABLET | Refills: 0 | Status: SHIPPED | OUTPATIENT
Start: 2022-07-13 | End: 2022-07-22

## 2022-07-13 RX ORDER — PREDNISONE 20 MG/1
20 TABLET ORAL DAILY
Qty: 3 TABLET | Refills: 0 | Status: SHIPPED | OUTPATIENT
Start: 2022-07-13 | End: 2022-07-16

## 2022-07-13 NOTE — PROGRESS NOTES
"Subjective:       Patient ID: Gustavo Perry is a 27 y.o. female.    Vitals:  height is 5' 7" (1.702 m) and weight is 93 kg (205 lb). Her oral temperature is 98.3 °F (36.8 °C). Her blood pressure is 131/82 and her pulse is 112 (abnormal). Her oxygen saturation is 97%.     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  Pain is sharp stabbing located between her shoulder blades and radiates down into her lower back.  Believes she has lower back injury from 2 years ago when she fell with her child when she was pregnant.  Has daily chronic low back pain but the upper back pain between the shoulder blades is new.  It does not keep her from performing her duties at work or a daily activities but she has constant pain that has not resolved over the last week.  Has not tried any medications or heat for discomfort.  Had no impact, crash, direct injury to affected area.  Denies chest pain, shortness of breath, difficulty or increased pain with breathing or cough.      Constitution: Negative for chills, sweating, fatigue and fever.   HENT: Negative.    Neck: neck negative.   Cardiovascular: Negative.  Negative for chest pain.   Eyes: Negative.    Respiratory: Negative.  Negative for chest tightness and shortness of breath.    Gastrointestinal: Negative.    Endocrine: negative.   Genitourinary: Negative.    Musculoskeletal: Positive for pain (thoracic pain), abnormal ROM of joint (FROM with discomfort, limited beyond 90 degrees of fwd flexion), back pain and muscle ache. Negative for trauma, joint pain and joint swelling.   Skin: Negative.  Negative for color change and hives.   Allergic/Immunologic: Negative.  Negative for seasonal allergies, hives and itching.   Neurological: Negative.  Negative for dizziness, light-headedness, disorientation, altered mental status, loss of consciousness, numbness and " tingling.   Hematologic/Lymphatic: Negative.    Psychiatric/Behavioral: Negative.  Negative for altered mental status, disorientation, confusion, agitation and nervous/anxious. The patient is not nervous/anxious.        Objective:      Physical Exam   Constitutional: She is oriented to person, place, and time. She appears well-developed. She is cooperative.  Non-toxic appearance. She does not appear ill. No distress.   HENT:   Head: Normocephalic and atraumatic.   Nose: Nose normal.   Mouth/Throat: Oropharynx is clear and moist and mucous membranes are normal.   Eyes: Conjunctivae and lids are normal.   Neck: Trachea normal and phonation normal. Neck supple.   Cardiovascular: Normal rate, regular rhythm, normal heart sounds and normal pulses.   Pulmonary/Chest: Effort normal and breath sounds normal. No stridor. She has no decreased breath sounds. She has no wheezes. She has no rhonchi. She has no rales.   Abdominal: Normal appearance and bowel sounds are normal. She exhibits no abdominal bruit, no pulsatile midline mass and no mass. Soft.   Musculoskeletal:         General: No deformity.      Thoracic back: She exhibits tenderness and spasm. She exhibits normal range of motion, no bony tenderness, no swelling, no edema and no deformity.      Lumbar back: She exhibits normal range of motion, no tenderness, no bony tenderness, no swelling, no edema, no deformity, no laceration and no spasm.        Back:    Neurological: She is alert and oriented to person, place, and time. She has normal strength and normal reflexes. No sensory deficit.   Skin: Skin is warm, dry, intact and not diaphoretic.   Psychiatric: Her speech is normal and behavior is normal. Judgment and thought content normal.   Nursing note and vitals reviewed.        Assessment:       1. Strain of thoracic back region          Plan:         Strain of thoracic back region  -     naproxen (NAPROSYN) 500 MG tablet; Take 1 tablet (500 mg total) by mouth 2  (two) times daily. for 15 days  Dispense: 30 tablet; Refill: 0  -     methocarbamoL (ROBAXIN) 750 MG Tab; Take 1 tablet (750 mg total) by mouth 3 (three) times daily. for 7 days  Dispense: 21 tablet; Refill: 0  -     predniSONE (DELTASONE) 20 MG tablet; Take 1 tablet (20 mg total) by mouth once daily. for 3 days  Dispense: 3 tablet; Refill: 0         There are no Patient Instructions on file for this visit.      BEBA Foote

## 2022-07-18 ENCOUNTER — PATIENT OUTREACH (OUTPATIENT)
Dept: ADMINISTRATIVE | Facility: HOSPITAL | Age: 27
End: 2022-07-18
Payer: COMMERCIAL

## 2022-07-18 ENCOUNTER — OFFICE VISIT (OUTPATIENT)
Dept: GASTROENTEROLOGY | Facility: CLINIC | Age: 27
End: 2022-07-18
Payer: COMMERCIAL

## 2022-07-18 VITALS
HEART RATE: 73 BPM | SYSTOLIC BLOOD PRESSURE: 122 MMHG | DIASTOLIC BLOOD PRESSURE: 86 MMHG | BODY MASS INDEX: 32.11 KG/M2 | RESPIRATION RATE: 16 BRPM | HEIGHT: 67 IN

## 2022-07-18 DIAGNOSIS — R10.10 UPPER ABDOMINAL PAIN, UNSPECIFIED: ICD-10-CM

## 2022-07-18 DIAGNOSIS — K81.1 CHRONIC CHOLECYSTITIS: ICD-10-CM

## 2022-07-18 DIAGNOSIS — R11.0 NAUSEA: ICD-10-CM

## 2022-07-18 DIAGNOSIS — K21.9 GASTROESOPHAGEAL REFLUX DISEASE, UNSPECIFIED WHETHER ESOPHAGITIS PRESENT: ICD-10-CM

## 2022-07-18 DIAGNOSIS — K25.9 GASTRIC ULCER, UNSPECIFIED CHRONICITY, UNSPECIFIED WHETHER GASTRIC ULCER HEMORRHAGE OR PERFORATION PRESENT: Primary | ICD-10-CM

## 2022-07-18 DIAGNOSIS — E65 OBESE ABDOMEN: ICD-10-CM

## 2022-07-18 PROCEDURE — 1159F PR MEDICATION LIST DOCUMENTED IN MEDICAL RECORD: ICD-10-PCS | Mod: S$GLB,,, | Performed by: INTERNAL MEDICINE

## 2022-07-18 PROCEDURE — 3044F PR MOST RECENT HEMOGLOBIN A1C LEVEL <7.0%: ICD-10-PCS | Mod: S$GLB,,, | Performed by: INTERNAL MEDICINE

## 2022-07-18 PROCEDURE — 3044F HG A1C LEVEL LT 7.0%: CPT | Mod: S$GLB,,, | Performed by: INTERNAL MEDICINE

## 2022-07-18 PROCEDURE — 3074F PR MOST RECENT SYSTOLIC BLOOD PRESSURE < 130 MM HG: ICD-10-PCS | Mod: S$GLB,,, | Performed by: INTERNAL MEDICINE

## 2022-07-18 PROCEDURE — 99999 PR PBB SHADOW E&M-EST. PATIENT-LVL III: ICD-10-PCS | Mod: PBBFAC,,, | Performed by: INTERNAL MEDICINE

## 2022-07-18 PROCEDURE — 99214 PR OFFICE/OUTPT VISIT, EST, LEVL IV, 30-39 MIN: ICD-10-PCS | Mod: S$GLB,,, | Performed by: INTERNAL MEDICINE

## 2022-07-18 PROCEDURE — 3008F PR BODY MASS INDEX (BMI) DOCUMENTED: ICD-10-PCS | Mod: S$GLB,,, | Performed by: INTERNAL MEDICINE

## 2022-07-18 PROCEDURE — 1160F PR REVIEW ALL MEDS BY PRESCRIBER/CLIN PHARMACIST DOCUMENTED: ICD-10-PCS | Mod: S$GLB,,, | Performed by: INTERNAL MEDICINE

## 2022-07-18 PROCEDURE — 3079F PR MOST RECENT DIASTOLIC BLOOD PRESSURE 80-89 MM HG: ICD-10-PCS | Mod: S$GLB,,, | Performed by: INTERNAL MEDICINE

## 2022-07-18 PROCEDURE — 1159F MED LIST DOCD IN RCRD: CPT | Mod: S$GLB,,, | Performed by: INTERNAL MEDICINE

## 2022-07-18 PROCEDURE — 3079F DIAST BP 80-89 MM HG: CPT | Mod: S$GLB,,, | Performed by: INTERNAL MEDICINE

## 2022-07-18 PROCEDURE — 3008F BODY MASS INDEX DOCD: CPT | Mod: S$GLB,,, | Performed by: INTERNAL MEDICINE

## 2022-07-18 PROCEDURE — 99214 OFFICE O/P EST MOD 30 MIN: CPT | Mod: S$GLB,,, | Performed by: INTERNAL MEDICINE

## 2022-07-18 PROCEDURE — 99999 PR PBB SHADOW E&M-EST. PATIENT-LVL III: CPT | Mod: PBBFAC,,, | Performed by: INTERNAL MEDICINE

## 2022-07-18 PROCEDURE — 3074F SYST BP LT 130 MM HG: CPT | Mod: S$GLB,,, | Performed by: INTERNAL MEDICINE

## 2022-07-18 PROCEDURE — 1160F RVW MEDS BY RX/DR IN RCRD: CPT | Mod: S$GLB,,, | Performed by: INTERNAL MEDICINE

## 2022-07-18 RX ORDER — PANTOPRAZOLE SODIUM 40 MG/1
40 TABLET, DELAYED RELEASE ORAL DAILY
Qty: 30 TABLET | Refills: 11 | Status: SHIPPED | OUTPATIENT
Start: 2022-07-18 | End: 2022-10-10 | Stop reason: SDUPTHER

## 2022-07-18 NOTE — PATIENT INSTRUCTIONS
She will continue her reflux regimen.  She was found to have gastroesophageal reflux and the biopsies were negative for H pylori.  She started on the Protonix.  She follows up with her gynecologist and surgeon.  She will make a food diary and avoid the offending foods particularly the fried in the fatty foods.

## 2022-07-18 NOTE — PROGRESS NOTES
Subjective:       Patient ID: Gustavo Perry is a 27 y.o. female.    Chief Complaint: Follow-up        Results  Upper GI endoscopy (Order [id number])        Result Information      Status: Final result (Collected: 7/5/2022 10:40) Provider Status: Reviewed     Reviewed By      Andrea Manning MD on 7/6/2022 07:07  Wale James MD on 7/5/2022 12:36  Andrea Manning MD on 7/5/2022 11:32      PACS Images     Show images for Upper GI endoscopy    Upper GI endoscopy  Order: [id number]  Status: Final result    Visible to patient: Yes (seen)    Next appt: 07/22/2022 at 11:30 AM in Family Medicine (Elizabet Mayes NP)    0 Result Notes          Narrative  Performed by: CATHY Recinos   Patient Name: Gustavo Perry   Procedure Date: 7/5/2022 10:40 AM   MRN: [id number]   Account Number: [id number]   YOB: 1995   Age: 27   Room: OR 1   Gender: Female   Attending MD: Andrea Manning MD   Procedure:             Upper GI endoscopy   Indications:           Epigastric abdominal pain, Abdominal pain in the                          right upper quadrant, Heartburn, Abnormal UGI                          series, Nausea   Providers:             Andrea Manning MD, Apple Bailey, RN, Dorothy Hidalgo, Technician   Complications:         No immediate complications.   Procedure:             After obtaining informed consent, the endoscope                          was passed under direct vision. Throughout the                          procedure, the patient's blood pressure, pulse,                          and oxygen saturations were monitored                          continuously. The Olympus scope GIF-H190 ([id number])                          was introduced through the mouth, and advanced to                          the second part of duodenum. The upper GI                          endoscopy was accomplished without difficulty. The                          patient tolerated the procedure well.    Findings:        LA Grade A (one or more mucosal breaks less than 5 mm, not extending        between tops of 2 mucosal folds) esophagitis with no bleeding was        found.        Diffuse moderately erythematous mucosa without bleeding was found in        the stomach. Biopsies were taken with a cold forceps for histology.        Estimated blood loss was minimal.        Diffuse moderately erythematous mucosa without active bleeding and        with no stigmata of bleeding was found in the duodenal bulb.        Biopsies were taken with a cold forceps for histology. Estimated        blood loss was minimal.   Impression:            - LA Grade A reflux esophagitis with no bleeding.                          - Erythematous mucosa in the stomach. Biopsied.                          - Erythematous duodenopathy. Biopsied.   Recommendation:        - Discharge patient to home (ambulatory).                          - Resume previous diet.   MD Andrea Coleman MD   7/5/2022 11:15:57 AM   This report has been verified and signed electronically.   Dear patient,   As a result of recent federal legislation (The Federal Cures Act), you may   receive lab or pathology results from your procedure in your [facility]   account before your physician is able to contact you. Your physician or   their representative will relay the results to you with their   recommendations at their soonest availability.   Thank you,   Number of Addenda: 0   Note Initiated On: 7/5/2022 10:40 AM   Estimated Blood Loss:  Estimated blood loss: none.        This report has been verified and signed electronically.    Specimen Collected: 07/05/22 10:40 Last Resulted: 07/05/22 16:23      Order Details     View Encounter     Lab and Collection Details     Routing     Result History          Linked Documents      View Image      Result Care Coordination        Patient Communication       Add Comments   Seen Back to Top                All Reviewers List    Andrea  JUANCARLOS Manning MD on 2022 07:07        Office Visit    2022  [facility] - General Surgery    Eric Huston MD      General Surgery Biliary dyskinesia      Dx Follow-up       Reason for Visit      H&P  Eric Huston MD (Physician)   General Surgery  Expand AllCollapse All      [facility] Surgery H&P Note       Subjective:      Patient ID: Gustavo Perry is a 27 y.o. female.     Chief Complaint:  I want my gallbladder  HPI:  Gustavo Perry is a 27 y.o. female out.  History of anxiety depression presents today as an established patient for evaluation of right upper quadrant pain.  Patient since last visit has undergone HIDA scan.  HIDA scan shows evidence of low ejection fraction of gallbladder consistent with biliary dyskinesia.  Patient with reproduction of symptoms associated with CCK administration portion examination.  Patient stated that she had severe right upper quadrant pain after the test after she got home.  Nausea no vomiting.  Given the above, patient presents today for follow-up evaluation desired to proceed with gallbladder surgery.       Past Medical History:  Diagnosis Date   Anxiety     Bipolar 1 disorder, depressed     Depression     PTSD (post-traumatic stress disorder)         Past Surgical History:  Procedure Laterality Date    SECTION       TONSILLECTOMY, ADENOIDECTOMY       TUBAL LIGATION       WISDOM TOOTH EXTRACTION           Family History  Problem Relation Age of Onset   Hypertension Mother     No Known Problems Father         Social History       Socioeconomic History   Marital status:   Tobacco Use   Smoking status: Current Every Day Smoker      Packs/day: 0.50      Years: 9.00      Pack years: 4.50      Types: Cigarettes   Smokeless tobacco: Never Used  Substance and Sexual Activity   Alcohol use: Not Currently      Comment: socially   Drug use: Never   Sexual activity: Yes      Partners: Male      Birth  "control/protection: None, See Surgical Hx      Comment: Tubal Ligation -           Current Medications  Current Outpatient Medications  Medication Sig Dispense Refill   iron-vitamin C 100-250 mg, ICAR-C, (ICAR-C) 100-250 mg Tab Take 1 tablet by mouth once daily at 6am. 30 tablet 11   ondansetron (ZOFRAN-ODT) 4 MG TbDL Take 1 tablet (4 mg total) by mouth every 6 (six) hours as needed (nausea or vomiting). 30 tablet 1   ibuprofen (ADVIL,MOTRIN) 200 MG tablet Take 200 mg by mouth every 6 (six) hours as needed for Pain.           Current Facility-Administered Medications  Medication Dose Route Frequency Provider Last Rate Last Admin   ceFOXItin (MEFOXIN) 2 g in dextrose 5 % 50 mL IVPB  2 g Intravenous On Call Procedure Eric Huston MD             Review of patient's allergies indicates:  Allergen Reactions   Sulfa (sulfonamide antibiotics) Hives        Review of Systems   Constitutional: Negative for activity change, appetite change, chills and fever.   HENT: Negative for congestion, dental problem and ear discharge.    Eyes: Negative for discharge and itching.   Respiratory: Negative for apnea, choking and chest tightness.    Cardiovascular: Negative for chest pain and leg swelling.   Gastrointestinal: Positive for abdominal pain. Negative for abdominal distention, anal bleeding, constipation, diarrhea and nausea.   Endocrine: Negative for cold intolerance and heat intolerance.   Genitourinary: Negative for difficulty urinating and dyspareunia.   Musculoskeletal: Negative for arthralgias and back pain.   Skin: Negative for color change and pallor.   Neurological: Negative for dizziness and facial asymmetry.   Hematological: Negative for adenopathy. Does not bruise/bleed easily.   Psychiatric/Behavioral: Negative for agitation and behavioral problems.         Objective:    Vitals  Vitals:    06/30/22 1018  BP: 126/84  Pulse: 75  SpO2: 99%  Weight: 92.5 kg (204 lb)  Height: 5' 7" (1.702 " m)       Physical Exam  Constitutional:       General: She is not in acute distress.     Appearance: She is well-developed.   HENT:      Head: Normocephalic and atraumatic.   Eyes:      Pupils: Pupils are equal, round, and reactive to light.   Neck:      Thyroid: No thyromegaly.   Cardiovascular:      Rate and Rhythm: Normal rate and regular rhythm.   Pulmonary:      Effort: Pulmonary effort is normal.      Breath sounds: Normal breath sounds.   Abdominal:      General: Bowel sounds are normal. There is no distension.      Palpations: Abdomen is soft.      Tenderness: There is abdominal tenderness in the right upper quadrant.   Musculoskeletal:         General: No deformity. Normal range of motion.      Cervical back: Normal range of motion and neck supple.   Skin:     General: Skin is warm.      Capillary Refill: Capillary refill takes less than 2 seconds.      Findings: No erythema.   Neurological:      Mental Status: She is alert and oriented to person, place, and time.      Cranial Nerves: No cranial nerve deficit.   Psychiatric:         Behavior: Behavior normal.            Lab Review:   CBC:     Lab Results  Component Value Date    WBC 6.46 05/16/2022    RBC 4.52 05/16/2022    HGB 11.9 (L) 05/16/2022    HCT 36.0 (L) 05/16/2022     05/16/2022     BMP:     Lab Results  Component Value Date    GLU 85 05/16/2022     05/16/2022    K 3.7 05/16/2022     05/16/2022    CO2 25 05/16/2022    BUN 9 05/16/2022    CREATININE 0.9 05/16/2022    CALCIUM 9.1 05/16/2022     Diagnostics Review: HIDA scan reviewed.  Evidence of low ejection fraction of gallbladder.  Reproduction of symptoms associated with CCK administration portion    Assessment:        1. Biliary dyskinesia         Plan:  Biliary dyskinesia  -     Case Request Operating Room: CHOLECYSTECTOMY-LAPAROSCOPIC  -     Full code; Standing  -     Place in Outpatient; Standing  -     Vital signs; Standing  -     Insert peripheral IV; Standing  -      Verify beta-blocker dose taken within 24 hours if patient is prescribed beta-blocker; Standing  -     Height and weight; Standing  -     Intake and output; Standing  -     Verify discontinuation of antithrombotics; Standing  -     POCT glucose; Standing  -     Verify blood consent; Standing  -     Verify consent; Standing  -     Clip and Prep Abdomen Zyphoid to Pubis; Standing  -     Chlorhexidine (CHG) 2% Wipes; Standing  -     Hibiclens shower; Standing  -     Hibiclens shower; Standing  -     Notify Physician; Standing  -     Diet NPO; Standing  -     Place MARIA A hose; Standing  -     Place sequential compression device; Standing     Other orders  -     0.9%  NaCl infusion  -     IP VTE LOW RISK PATIENT; Standing  -     ceFOXItin (MEFOXIN) 2 g in dextrose 5 % 50 mL IVPB           Medical Decision Making/Counseling:  Patient with evidence of biliary colic with biliary dyskinesia.  Patient offered low-fat diet versus cholecystectomy.  Risk benefits were discussed in detail the patient clinic today.  After risk benefits discussion, patient voiced understanding risk benefits and desires proceed with surgical cholecystectomy.  All questions were answered patient satisfaction.     Risks and benefits of cholecystectomy were discussed with the patient. Benefits the patient could receive would be the resolution of gallbladder attacks causing pain, cramps, nausea vomiting etc.  Benefits also include eliminating the risk of the patient presenting through the ER with acute cholecystitis and needing an emergent operation.  Risks of cholecystectomy were also included in our discussion.  Risks include injury to the common bile duct (liver drain tube) occurring in 1 in 250 cholecystectomy is performed across the [place].  I discussed with the patient that if this were to occur she could need further surgeries to include likely a hepaticojejunostomy.  I discussed there is a possibility of transfer for surgical therapy for  "this, if it were to occur.  I also discussed the risk of conversion to an open procedure (cold fashion surgery, how gallbladders were taken out previously) to occur in 5% of cholecystectomies.  I discussed that in 100% cholecystectomies I started with the small incision (laparoscopic technique).  Reason for conversion to an open procedure is related to bleeding, significant scarring or inflammation, inability to obtain a critical view which could lead to injury of surrounding structures to include the stomach, duodenum, IVC, common bile duct, etc.  I discussed there is also a possibility of postoperatively needing reoperation.  Additionally, I have discussed with the patient the possibilities of biliary leak after laparoscopic cholecystectomy.  Literature would suggest a 2% leak rate.  This could necessitate a postoperative ERCP or even postoperative procedure including diagnostic laparoscopy for drainage or percutaneous drainage.  There is also the intrinsic risks of any surgery to include bleeding and infection.  Patient understands all the above risks and benefits and wishes to proceed in the near future with laparoscopic versus open cholecystectomy.  Counseling time 60 minutes in clinic.  I drew a picture for the patient of the foregut liver biliary anatomy and tree for further understanding while in clinic.     Patient instructed that best way to communicate with my office staff is for patient to get on the [facility] epic patient portal to expedite communication and communication issues that may occur.  Patient was given instructions on how to get on the portal.  I encouraged patient to obtain portal access as well.  Ultimately it is up to the patient to obtain access.  Patient voiced understanding.             Other Notes  All notes      Nursing from Melisa Call LPN        Instructions    After Visit Summary (Printed 6/30/2022)        Additional Documentation    Vitals: /84    Pulse 75    Ht 5' 7" (1.702 " m)    Wt 92.5 kg (204 lb)    LMP 06/07/2022 (Exact Date)    SpO2 99%    BMI 31.95 kg/m²    BSA 2.09 m²    Pain Sc   3 (Loc: Abdomen)    Flowsheets: Anthropometrics      SmartForms:  [facility] AMB - FALL RISK      [facility] HM TOPICS ADVANCED      Encounter Info: Billing Info,    History,    Allergies,    Detailed Report      Not recorded      All Charges for This Encounter    Code Description Service Date Service Provider Modifiers Qty  85578 AR OFFICE/OUTPT VISIT, EST, LEVL V, 40-54 MIN 6/30/2022 Eric Huston MD S$GLB 1  3079F AR MOST RECENT DIASTOLIC BLOOD PRESSURE 80-89 MM HG 6/30/2022 Eric Huston MD S$GLB 1  3074F AR MOST RECENT SYSTOLIC BLOOD PRESSURE < 130 MM HG 6/30/2022 Eric Huston MD S$GLB 1  3044F AR MOST RECENT HEMOGLOBIN A1C LEVEL <7.0% 6/30/2022 Eric Huston MD S$GLB 1  3008F AR BODY MASS INDEX (BMI) DOCUMENTED 6/30/2022 Eric Huston MD S$GLB 1  345291678 AR PBB SHADOW E&M-EST. PATIENT-LVL IV 6/30/2022 Eric Huston MD PBBFAC 1    Level of Service    Level of Service  AR OFFICE/OUTPT VISIT, EST, LEVL V, 40-54 MIN (12972)    BestPractice Advisories    Click to view BestPractice Advisory history    AVS Reports    Date/Time Report Action User  6/30/2022 10:38 AM After Visit Summary Printed Melisa Call LPN    Encounter-Level Documents on 06/30/2022:    After Visit Summary - Document on 6/30/2022 10:38 AM by Melisa Call LPN: After Visit Summary      Orders Placed    Case Request Operating Room: CHOLECYSTECTOMY-LAPAROSCOPIC      Medication Changes        ceFOXItin (MEFOXIN) 2 g in dextrose 5 % 50 mL IVPB 2 g Intravenous On Call Procedure @ 100 mL/hr, Administer 30 minutes prior to incision      Medication List    Visit Diagnoses        Biliary dyskinesia      Problem List        Office Visit    6/13/2022  [facility] - Gastroenterology    Andrea Manning MD      Gastroenterology Iron deficiency anemia, unspecified iron deficiency anemia type +4  "more      Dx Abdominal Pain   Other ; Referred by Wale James MD      Reason for Visit      Progress Notes  Andrea Manning MD (Physician)   Gastroenterology       Subjective:      Patient ID: Gustavo Perry is a 27 y.o. female.     Chief Complaint: Abdominal Pain (Sharp pains "by gallbladder") and Other (Nausea before & after eating)     For at least 2 months she has experienced nausea.  She has had pyrosis.  She has tried to decrease her oral intake because she does not want to vomit.  She does not associate her symptoms with a specific food.  She denies a precipitating factor.  The ultrasound shows a contracted gallbladder but the CT scan was normal.  Her mother recently had a cholecystectomy.  An uncle and grandfather had Pineda's disease, esophageal stomach and liver cancer.  She denies dysphagia aspiration.  She has 4 children with her last delivery year ago.  Her children are living and well.  She appears to have iron deficiency with a decreased MCV. She has menorrhagia and has not followed up with her gynecologist but is going to do so.  She denies hematemesis hematochezia jaundice or bleeding.  She has complained of nonspecific right upper quadrant epigastric discomfort which she is difficult to describe but associated with nausea.        Allergies:    Review of patient's allergies indicates:  Allergen Reactions   Sulfa (sulfonamide antibiotics) Hives        Medications:     Current Outpatient Medications:     ibuprofen (ADVIL,MOTRIN) 200 MG tablet, Take 200 mg by mouth every 6 (six) hours as needed for Pain., Disp: , Rfl:     ondansetron (ZOFRAN-ODT) 4 MG TbDL, Take 1 tablet (4 mg total) by mouth every 6 (six) hours as needed (nausea or vomiting). (Patient not taking: Reported on 6/13/2022), Disp: 30 tablet, Rfl: 1       Past Medical History:  Diagnosis Date   Anxiety     Bipolar 1 disorder, depressed     Depression     PTSD (post-traumatic stress disorder)            Past Surgical " History:  Procedure Laterality Date    SECTION       TONSILLECTOMY, ADENOIDECTOMY       TUBAL LIGATION       WISDOM TOOTH EXTRACTION               Review of Systems   Constitutional: Negative for appetite change, fever and unexpected weight change.   HENT: Negative for trouble swallowing.         No jaundice.   Respiratory: Negative for cough, shortness of breath and wheezing.         She is a daily cigarette smoker.  She has been offered the smoking cessation program in the past.  She denies alcohol consumption.  She denies dysphagia aspiration hemoptysis chronic cough chronic sputum production or dyspnea on exertion.   Cardiovascular: Negative for chest pain.        She denies exertional chest pain or rhythm disturbance.   Gastrointestinal: Positive for abdominal pain and nausea. Negative for abdominal distention, anal bleeding, blood in stool, constipation, diarrhea and rectal pain.          CT Abdomen Pelvis  Without Contrast     Status: Final result     MyChart Results Release     [employer] Cl[employer] Status: Active         Results Release        PACS Images for [facility] [place] Viewer      Show images for CT Abdomen Pelvis Without Contrast     CT Abdomen Pelvis  Without Contrast  Order: [id number]  Status: Final result       Visible to patient: Yes (seen)       Next appt: 2022 at 08:45 AM in General Surgery (Eric Huston MD)       0 Result Notes        Details        Reading Physician    Reading Date Result Priority  Deangelo Torers MD  [phone]  2022        STAT     Narrative & Impression  EXAMINATION:  CT ABDOMEN PELVIS WITHOUT CONTRAST     CLINICAL HISTORY:  Abdominal pain, acute, nonlocalized;     TECHNIQUE:  Low dose axial images, sagittal and coronal reformations were obtained from the lung bases to the pubic symphysis.  Oral contrast was not administered.     COMPARISON:  CT 2017.     FINDINGS:  The liver and spleen are normal in size and attenuation.  The gallbladder,  pancreas and adrenal glands are unremarkable.     Kidneys are normal in size and attenuation.  No renal calculi.  No changes of hydronephrosis.  No perinephric inflammatory change.     Air and stool throughout the loops of colon.  No mesenteric inflammatory change.  No CT evidence for bowel obstruction.  Appendix is normal in caliber.     The bladder is decompressed.  Uterus and ovaries are normal in size and attenuation.  No free fluid in cul-de-sac.     No significant mesenteric or retroperitoneal lymphadenopathy.  There is a small fat containing umbilical hernia.     Impression:     1. No renal calculi.  2. Small fat containing umbilical hernia.        Electronically signed by: Deangelo Torres  Date:                                            05/16/2022  Time:                                           13:56                 Exam Ended: 05/16/22 13:35           Last Resulted: 05/16/22 13:56      Order Details        View Encounter        Lab and Collection Details        Routing        Result History                Result Care Coordination           Patient Communication         Add Comments         Seen   Back to Top              Other Results from 5/16/2022         Contains abnormal data Comprehensive Metabolic Panel (CMP)  Order: [id number]  Status: Final result       Visible to patient: Yes (seen)       Next appt: 06/23/2022 at 08:45 AM in General Surgery (Eric Huston MD)       0 Result Notes        Component    Ref Range & Units    4 wk ago         2 mo ago        3 mo ago        1 yr ago  Sodium           136 - 145 mmol/L       140      140      137      135 Low    Potassium      3.5 - 5.1 mmol/L         3.7       4.1       3.2 Low           3.5   Chloride         95 - 110 mmol/L         108      107      104      107   CO2     23 - 29 mmol/L           25        23        24        19 Low    Glucose          70 - 110 mg/dL           85        86        106      96   BUN    6 - 20 mg/dL   9          12         10        8   Creatinine      0.5 - 1.4 mg/dL           0.9       0.9       0.9       0.6   Calcium          8.7 - 10.5 mg/dL         9.1       9.3       9.1       9.0   Total Protein  6.0 - 8.4 g/dL  7.1       7.5       7.4       6.7   Albumin          3.5 - 5.2 g/dL  3.8       3.8       4.2       3.5   Total Bilirubin            0.1 - 1.0 mg/dL           0.2       0.2 CM            0.4 CM            0.1 CM   Comment: For infants and newborns, interpretation of results should be based   on gestational age, weight and in agreement with clinical   observations.      Premature Infant recommended reference ranges:   Up to 24 hours.............<8.0 mg/dL   Up to 48 hours............<12.0 mg/dL   3-5 days..................<15.0 mg/dL   6-29 days.................<15.0 mg/dL   Alkaline Phosphatase          55 - 135 U/L    77        73        75        59   AST     10 - 40 U/L      16        12        14        12   ALT     10 - 44 U/L      18        12        16        12   Anion Gap      8 - 16 mmol/L 7 Low              10        9          9   eGFR if African American    >60 mL/min/1.73 m^2           >60.0   >60.0   >60.0   >60.0   eGFR if non African American        >60 mL/min/1.73 m^2           >60.0   >60.0 CM        >60.0 CM      >60.0 CM   Comment: Calculation used to obtain the estimated glomerular filtration   rate (eGFR) is the CKD-EPI equation.   Resulting Agency                 [facility]           [facility]           [facility]  [facility]                 Specimen Collected: 05/16/22 13:32           Last Resulted: 05/16/22 14:21       Lab Flowsheet        Order Details        View Encounter        Lab and Collection Details        Routing        Result History          CM=Additional comments          Result Care Coordination           Patient Communication         Add Comments         Seen   Back to Top                  Contains abnormal data Complete Blood Count (CBC)  Order: [id number]  Status: Final result        Visible to patient: Yes (seen)       Next appt: 06/23/2022 at 08:45 AM in General Surgery (Eric Huston MD)       0 Result Notes        Component    Ref Range & Units    4 wk ago   (5/16/22)          2 mo ago   (4/1/22)            3 mo ago   (3/15/22)          1 yr ago   (12/23/20)        1 yr ago   (12/22/20)        1 yr ago   (6/25/20)  WBC    3.90 - 12.70 K/uL        6.46     5.57     7.01     8.66     8.31     7.10   RBC    4.00 - 5.40 M/uL         4.52     4.87     4.69     3.30 Low         4.07     4.20   Hemoglobin   12.0 - 16.0 g/dL          11.9 Low         12.8     12.4     9.4 Low           11.3 Low            12.5   Hematocrit     37.0 - 48.5 %   36.0 Low         40.1     37.5     29.2 Low         35.3 Low         35.8 Low    MCV    82 - 98 fL        80 Low            82        80 Low            89        87        85   MCH    27.0 - 31.0 pg 26.3 Low         26.3 Low         26.4 Low         28.5     27.8     29.8   MCHC 32.0 - 36.0 g/dL          33.1     31.9 Low         33.1     32.2     32.0     34.9   RDW    11.5 - 14.5 %   14.9 High        14.5     14.9 High        13.3     13.4     12.7   Platelets         150 - 450 K/uL            240      278      245      223 R   255 R   195 R   MPV    9.2 - 12.9 fL    10.8     10.1     10.6     11.0     11.0     10.3   Immature Granulocytes       0.0 - 0.5 %       0.3       0.2       0.3       0.3       0.2       0.3   Gran # (ANC) 1.8 - 7.7 K/uL  3.1       2.6       4.9       6.0       5.2       4.1   Immature Grans (Abs)          0.00 - 0.04 K/uL          0.02     0.01 CM          0.02 CM          0.03 CM      0.02 CM          0.02 CM   Comment: Mild elevation in immature granulocytes is non specific and   can be seen in a variety of conditions including stress response,   acute inflammation, trauma and pregnancy. Correlation with other   laboratory and clinical findings is essential.   Lymph #         1.0 - 4.8 K/uL  3.0       2.5       1.5        2.1       2.6       2.5   Mono #           0.3 - 1.0 K/uL  0.3       0.3       0.4       0.5       0.5       0.4   Eos #   0.0 - 0.5 K/uL  0.1       0.2       0.1       0.1       0.1       0.1   Baso #            0.00 - 0.20 K/uL          0.03     0.03     0.05     0.01     0.03     0.02   nRBC  0 /100 WBC    0          0          0          0          0          0   Gran %           38.0 - 73.0 %   47.7     46.3     69.1     69.0     62.2     57.3   Lymph %        18.0 - 48.0 %   46.6     44.9     22.0     23.8     30.7     35.8   Mono %          4.0 - 15.0 %     4.0       5.2       6.3       6.2       5.8       5.5   Eosinophil % 0.0 - 8.0 %       0.9       2.9       1.6       0.6       0.7       0.8   Basophil %     0.0 - 1.9 %       0.5       0.5       0.7       0.1       0.4       0.3   Differential Method               Automated     Automated     Automated     Automated     Automated            Automated   Resulting Agency                 [facility]           [facility]           [facility]  [facility]  [facility]            [facility]                 Specimen Collected: 05/16/22 13:32           Last Resulted: 05/16/22 13:45       Lab Flowsheet        Order Details        View Encounter        Lab and Collection Details        Routing        Result History          CM=Additional comments  R=Reference range differs from displayed range          Result Care Coordination           Patient Communication         Add Comments         Seen   Back to Top                  Contains abnormal data Urinalysis, Reflex to Urine Culture Urine, Clean Catch  Order: [id number]  Status: Final result       Visible to patient: Yes (seen)       Next appt: 06/23/2022 at 08:45 AM in General Surgery (Eric Huston MD)       Specimen Information:         Urine, Clean Catch                0 Result Notes        Component    Ref Range & Units    4 wk ago   (5/16/22)          6 mo ago   (11/30/21)        1 yr ago   (12/22/20)        2 yr ago    (4/17/20)          2 yr ago   (11/13/19)  Specimen UA             Urine, Unspecified    Urine, Catheterized   Urine, Clean Catch    Urine, Clean Catch  Urine, Clean Catch   Color, UA       Yellow, Straw, Tatiana           Yellow             Yellow             Yellow             Yellow            Yellow   Appearance, UA        Clear   Clear   Clear   Hazy Abnormal          Clear   Clear   pH, UA            5.0 - 8.0           6.0       6.0       7.0       6.0       5.0   Specific Gravity, UA 1.005 - 1.030   1.025   >=1.030 Abnormal     1.025   1.015   1.025   Protein, UA    Negative         Negative         Negative CM  Trace Abnormal  CM             Negative CM      Trace Abnormal  CM   Comment: Recommend a 24 hour urine protein or a urine   protein/creatinine ratio if globulin induced proteinuria is   clinically suspected.   Glucose, UA   Negative         Negative         Negative         Negative         Negative         Negative   Ketones, UA   Negative         Negative         Negative         Negative         Negative         Negative   Bilirubin (UA) Negative         Negative         Negative         Negative         Negative         Negative   Occult Blood UA       Negative         Trace Abnormal        Trace Abnormal        Negative            Negative         3+ Abnormal    Nitrite, UA      Negative         Negative         Negative         Negative         Negative         Negative   Urobilinogen, UA      Negative EU/dL         Negative         Negative         Negative         Negative            Negative   Leukocytes, UA         Negative         Negative         Negative         Trace Abnormal        Trace Abnormal      Negative   Resulting Agency                 [facility]           [facility]           [facility]  [facility]            [facility]                 Narrative  [facility]     Preferred Collection Type->Urine, Clean Catch   Specimen Source->Urine     Specimen Collected: 05/16/22  12:49           Last Resulted: 05/16/22 13:11       Lab Flowsheet        Order Details        View Encounter        Lab and Collection Details        Routing        Result History          CM=Additional comments          Result Care Coordination           Patient Communication         Add Comments         Seen   Back to Top                 Pregnancy, urine rapid  Order: [id number]  Status: Final result       Visible to patient: Yes (seen)       Next appt: 06/23/2022 at 08:45 AM in General Surgery (Eric Huston MD)       0 Result Notes        Component    4 wk ago   Preg Test, Ur  Negative   Resulting Agency     [facility]                 Narrative  Performed by: [facility]     Specimen Source->Urine     Specimen Collected: 05/16/22 12:49           Last Resulted: 05/16/22 13:11       Lab Flowsheet        Order Details        View Encounter        Lab and Collection Details        Routing        Result History                Result Care Coordination           Patient Communication         Add Comments         Seen   Back to Top                 CT Abdomen Pelvis  Without Contrast: Patient Communication      Add Comments         Seen        External Result Report     External Result Report     Narrative & Impression        EXAMINATION:  CT ABDOMEN PELVIS WITHOUT CONTRAST     CLINICAL HISTORY:  Abdominal pain, acute, nonlocalized;     TECHNIQUE:  Low dose axial images, sagittal and coronal reformations were obtained from the lung bases to the pubic symphysis.  Oral contrast was not administered.     COMPARISON:  CT 06/28/2017.     FINDINGS:  The liver and spleen are normal in size and attenuation.  The gallbladder, pancreas and adrenal glands are unremarkable.     Kidneys are normal in size and attenuation.  No renal calculi.  No changes of hydronephrosis.  No perinephric inflammatory change.     Air and stool throughout the loops of colon.  No mesenteric inflammatory change.  No CT evidence for bowel  obstruction.  Appendix is normal in caliber.     The bladder is decompressed.  Uterus and ovaries are normal in size and attenuation.  No free fluid in cul-de-sac.     No significant mesenteric or retroperitoneal lymphadenopathy.  There is a small fat containing umbilical hernia.     Impression:     1. No renal calculi.  2. Small fat containing umbilical hernia.        Electronically signed by: Deangelo Torres  Date:                                            05/16/2022  Time:                                           13:56                   Encounter        View Encounter              Signed by     Signed            Time    Phone Pager  Deangelo Torres MD         5/16/2022 13:56          [phone]       Exam Details     Performed Procedure           Technologist  Supporting Staff        Performing Physician  CT Abdomen Pelvis  Without Contrast      Albert Carranza, RT                                Appointment Date/Status     Modality         Department      5/16/2022     Completed        [facility] CT1      [facility] CT SCAN                Begin Exam   End Exam       Begin Exam Questionnaires            End Exam Questionnaires  5/16/2022  1:35 PM    5/16/2022  1:35 PM    RIS PREGNANCY TECH NAVIGATOR        IMAGING END ALL           Reason for Exam  Priority: STAT  Abdominal pain, acute, nonlocalized     Order Report      Order Details        US Abdomen Complete     Status: Final result           MyChart Results Release     [employer] Status: Active         Results Release           US Abdomen Complete: Result Notes                 Wale James MD   5/31/2022  8:25 AM CDT               Discussed via portal on 5/31/22. Referred to surgery              PACS Images for [facility] [place] Viewer      Show images for US Abdomen Complete                       All Reviewers List     Wale James MD on 5/31/2022 08:25        US Abdomen Complete  Order: [id number]  Status: Final result    Visible to patient: Yes (seen)     Next appt: 06/23/2022 at 08:45 AM in General Surgery (Eric Huston MD)    Dx: RUQ abdominal pain    1 Result Note     Details        Reading Physician    Reading Date Result Priority  Minor Millan Jr., MD  [phone]  5/26/2022        Routine     Narrative & Impression  EXAMINATION:  US ABDOMEN COMPLETE     CLINICAL HISTORY:  Right upper quadrant pain     TECHNIQUE:  Complete abdominal ultrasound (including pancreas, aorta, liver, gallbladder, common bile duct, IVC, kidneys, and spleen) was performed.     COMPARISON:  None     FINDINGS:  Pancreas: The visualized portions of pancreas appear normal.     Aorta: No aneurysm.     Liver: 15.5 cm, normal in size. Homogeneous parenchymal echotexture. No focal lesions.     Gallbladder: Significantly contracted.  Shadowing stone is not identified but the imaging is suboptimal.  Negative sonographic Xie's sign.     Biliary system: 2.3 mm common bile duct.  No intrahepatic ductal dilatation.     Inferior vena cava: Normal in appearance.     Right kidney: 11.7 cm. No hydronephrosis.     Left kidney: 11.9 cm. Nine     Spleen: 9.7 cm.  Normal in size with homogeneous echotexture.     Miscellaneous: No ascites.     Impression:     Suboptimal imaging of the gallbladder due to significant contraction.  Otherwise negative abdomen ultrasound        Electronically signed by: Minor Millan MD  Date:                                            05/26/2022  Time:                                           11:13              Exam Ended: 05/26/22 09:48           Last Resulted: 05/26/22 11:13  Ryan as an Unsuccessful Attempt     Order Details    View Encounter    Lab and Collection Details    Routing    Result History             Result Care Coordination        Result Notes                    Wale James MD   5/31/2022  8:25 AM CDT       Back to Top        Discussed via portal on 5/31/22. Referred to surgery     Patient Communication        Add Comments          Seen    Back to Top                       US Abdomen Complete: Patient Communication     Add Comments         Seen        External Result Report     External Result Report        Narrative & Impression        EXAMINATION:  US ABDOMEN COMPLETE     CLINICAL HISTORY:  Right upper quadrant pain     TECHNIQUE:  Complete abdominal ultrasound (including pancreas, aorta, liver, gallbladder, common bile duct, IVC, kidneys, and spleen) was performed.     COMPARISON:  None     FINDINGS:  Pancreas: The visualized portions of pancreas appear normal.     Aorta: No aneurysm.     Liver: 15.5 cm, normal in size. Homogeneous parenchymal echotexture. No focal lesions.     Gallbladder: Significantly contracted.  Shadowing stone is not identified but the imaging is suboptimal.  Negative sonographic Xie's sign.     Biliary system: 2.3 mm common bile duct.  No intrahepatic ductal dilatation.     Inferior vena cava: Normal in appearance.     Right kidney: 11.7 cm. No hydronephrosis.     Left kidney: 11.9 cm. Nine     Spleen: 9.7 cm.  Normal in size with homogeneous echotexture.     Miscellaneous: No ascites.     Impression:     Suboptimal imaging of the gallbladder due to significant contraction.  Otherwise negative abdomen ultrasound        Electronically signed by: Minor Millan MD  Date:                                            05/26/2022  Time:                                           11:13                Encounter        View Encounter                    Signed by     Signed            Time    Phone Pager  Minor Millan Jr., MD 5/26/2022 11:13          [phone]       Reviewed By     Wale James MD on 5/31/2022 08:25              Exam Details     Performed Procedure           Technologist  Supporting Staff        Performing Physician  US Abdomen Complete        Maria Elena Rebollar RDMS                       Appointment Date/Status     Modality         Department      5/26/2022     Completed        [facility] US 2    [facility]                   Begin Exam   End Exam       Begin Exam Questionnaires            End Exam Questionnaires  5/26/2022  9:11 AM    5/26/2022  9:48 AM    RIS PREGNANCY TECH NAVIGATOR        IMAGING END ALL              Reason for Exam  Priority: Routine  Dx: RUQ abdominal pain (R10.11 (ICD-10-CM))           Order Report     Order Details                 Musculoskeletal: Negative for back pain and neck pain.   Skin: Negative for pallor and rash.   Neurological: Negative for dizziness, seizures, syncope, speech difficulty, weakness and numbness.   Hematological: Negative for adenopathy.   Psychiatric/Behavioral: Negative for confusion.         Objective:  Physical Exam  Vitals reviewed.   Constitutional:       Appearance: She is well-developed.      Comments: Well-nourished well-hydrated overweight afebrile nonicteric white female.  She is sitting comfortably in the chair in breathing normally.  She appears to be oriented x3 and can relate her history and answer questions appropriately.  She is normocephalic.  Pupils are normal.   HENT:      Head: Normocephalic.   Eyes:      Pupils: Pupils are equal, round, and reactive to light.   Neck:      Thyroid: No thyromegaly.      Trachea: No tracheal deviation.   Cardiovascular:      Rate and Rhythm: Normal rate and regular rhythm.      Heart sounds: Normal heart sounds.   Pulmonary:      Effort: Pulmonary effort is normal.      Breath sounds: Normal breath sounds.   Abdominal:      General: Bowel sounds are normal. There is no distension.      Palpations: Abdomen is soft. There is no mass.      Tenderness: There is abdominal tenderness. There is no right CVA tenderness, left CVA tenderness, guarding or rebound.      Hernia: No hernia is present.      Comments: The abdomen is soft is mildly obese with mild tenderness to palpation in the epigastrium right upper quadrant.  Organomegaly or masses are not detected.  Bowel sounds are normal.   Musculoskeletal:          "General: Normal range of motion.      Cervical back: Normal range of motion and neck supple.      Comments: She can ambulate normally.  She can go from the sitting to the standing position without difficulty.  She can get on the exam table without difficulty or assistance.   Lymphadenopathy:      Cervical: No cervical adenopathy.   Skin:     General: Skin is warm and dry.   Neurological:      Mental Status: She is alert and oriented to person, place, and time.      Cranial Nerves: No cranial nerve deficit.   Psychiatric:         Behavior: Behavior normal.               Plan:      Iron deficiency anemia, unspecified iron deficiency anemia type  -     Iron and TIBC; Future; Expected date: 06/13/2022     Nausea  -     Ambulatory referral/consult to Gastroenterology     Upper abdominal pain, unspecified  -     NM Hepatobiliary Scan W Pharm Intervention; Future; Expected date: 06/13/2022     Gastroesophageal reflux disease, unspecified whether esophagitis present     Obese abdomen     She will continue her reflux regimen current medications vitamins and minerals.  Evaluation of her anemia is in progress.  A biliary scan and is scheduled.  She will be scheduled for upper endoscopy.  She has good health knowledge and she understands the procedure.  Specifically she realizes there is an extremely small incidence of bleeding perforation or aspiration.  She follows up with her gynecologist for evaluation of the menorrhagia.                 Other Notes    All notes        Instructions        Follow up in about 1 month (around 7/13/2022).    She will continue her nutritious diet.  Upper endoscopy, biliary scan and evaluation by her gynecologist for menorrhagia is in progress.  She has iron deficiency.           After Visit Summary (Automatic SnapShot taken 6/13/2022)      Additional Documentation    Vitals: /80 (BP Location: Left arm, Patient Position: Sitting, BP Method: Medium (Automatic))    Pulse 83    Ht 5' 7" (1.702 " m)    Wt 90.9 kg (200 lb 8 oz)    LMP 06/07/2022 (Exact Date)    SpO2 98%    BMI 31.40 kg/m²    BSA 2.07 m²    Pain Sc 0-No pain    Flowsheets: Anthropometrics      SmartForms:  COURTESY CALL DOCUMENTATION      [facility] AMB - FALL RISK      Encounter Info: Billing Info,    History,    Allergies,    Detailed Report        Communications        AMB Visit Summary: Provider Version sent to Wale James MD      Not recorded      All Charges for This Encounter    Code Description Service Date Service Provider Modifiers Qty  05599 [place] OFFICE/OUTPT VISIT, NEW LEVL IV, 45-59 MIN 6/13/2022 Andrea Manning MD S$GLB 1  [id number] [place] PBB SHADOW E&M-EST. PATIENT-LVL IV 6/13/2022 Andrea Manning MD PBBFAC 1  3079F [place] MOST RECENT DIASTOLIC BLOOD PRESSURE 80-89 MM HG 6/13/2022 Andrea Manning MD S$GLB 1  3074F [place] MOST RECENT SYSTOLIC BLOOD PRESSURE < 130 MM HG 6/13/2022 Andrea Manning MD S$GLB 1  3044F [place] MOST RECENT HEMOGLOBIN A1C LEVEL <7.0% 6/13/2022 Andrea Manning MD S$GLB 1  3008F [place] BODY MASS INDEX (BMI) DOCUMENTED 6/13/2022 Andrea Manning MD S$GLB 1  1159F [place] MEDICATION LIST DOCUMENTED IN MEDICAL RECORD 6/13/2022 Andrea Manning MD S$GLB 1  1160F [place] REVIEW ALL MEDS BY PRESCRIBER/CLIN PHARMACIST DOCUMENTED 6/13/2022 Andrea Manning MD S$GLB 1    Level of Service    Level of Service  KS OFFICE/OUTPT VISIT, NEW, LEVL IV, 45-59 MIN (78312)    BestPractice Advisories    Click to view BestPractice Advisory history    AVS Reports    Date/Time Report Action User  6/13/2022 11:51 AM After Visit Summary Automatically Generated Andrea Manning MD    Encounter-Level Documents on 06/13/2022:    After Visit Summary - Document on 6/13/2022 11:51 AM by Andrea Manning MD: After Visit Summary      Orders Placed        Iron and TIBC        NM Hepatobiliary Scan W Pharm Intervention        Other Orders Performed    Ambulatory referral/consult to Gastroenterology Closed      Medication Changes        None      Medication List    Visit  Diagnoses        Iron deficiency anemia, unspecified iron deficiency anemia type        Nausea        Upper abdominal pain, unspecified        Gastroesophageal reflux disease, unspecified whether esophagitis present        Obese abdomen      Problem List        She complains of pyrosis and dyspepsia.  Upper endoscopy revealed gastroesophageal reflux the biopsies were negative for H pylori.  She has chronic cholecystitis and is scheduled for cholecystectomy by her surgeon.  She tried to make a food diary and avoid the offending foods particularly the fried in the fatty foods.  Her family history is positive for cholecystitis gallstones Pineda's esophagus is and gastroesophageal reflux.  She denies dysphagia hematemesis hematochezia jaundice or bleeding.  She states the Naprosyn has cause some indigestion.  She has had occasional nausea without vomiting but cannot pinpoint a precipitating factor.  She generally has daily bowel movements.      Allergies:  Review of patient's allergies indicates:   Allergen Reactions    Sulfa (sulfonamide antibiotics) Hives       Medications:    Current Outpatient Medications:     iron-vitamin C 100-250 mg, ICAR-C, (ICAR-C) 100-250 mg Tab, Take 1 tablet by mouth once daily at 6am., Disp: 30 tablet, Rfl: 11    methocarbamoL (ROBAXIN) 750 MG Tab, Take 1 tablet (750 mg total) by mouth 3 (three) times daily. for 7 days, Disp: 21 tablet, Rfl: 0    naproxen (NAPROSYN) 500 MG tablet, Take 1 tablet (500 mg total) by mouth 2 (two) times daily. for 15 days, Disp: 30 tablet, Rfl: 0    ondansetron (ZOFRAN-ODT) 4 MG TbDL, Take 1 tablet (4 mg total) by mouth every 6 (six) hours as needed (nausea or vomiting)., Disp: 30 tablet, Rfl: 1    pantoprazole (PROTONIX) 40 MG tablet, Take 1 tablet (40 mg total) by mouth once daily., Disp: 30 tablet, Rfl: 11    Current Facility-Administered Medications:     ceFOXItin (MEFOXIN) 2 g in dextrose 5 % 50 mL IVPB, 2 g, Intravenous, On Call Procedure, Eric  ESTEBAN Huston MD    Past Medical History:   Diagnosis Date    Anxiety     Bipolar 1 disorder, depressed     Depression     PTSD (post-traumatic stress disorder)        Past Surgical History:   Procedure Laterality Date     SECTION      ESOPHAGOGASTRODUODENOSCOPY N/A 2022    Procedure: EGD (ESOPHAGOGASTRODUODENOSCOPY);  Surgeon: Andrea Manning MD;  Location: [facility];  Service: Endoscopy;  Laterality: N/A;    TONSILLECTOMY, ADENOIDECTOMY      TUBAL LIGATION      WISDOM TOOTH EXTRACTION           Review of Systems   Constitutional: Negative for appetite change, fever and unexpected weight change.   HENT: Negative for trouble swallowing.         No jaundice.   Respiratory: Negative for cough, shortness of breath and wheezing.         She is a daily cigarette smoker.  She has been offered the smoking cessation program in the past.  She will consider this option.  She denies alcohol usage.  She denies dysphagia aspiration hemoptysis chronic cough chronic sputum production or dyspnea on exertion.   Cardiovascular: Negative for chest pain.        She denies cardiopulmonary symptoms such as exertional chest pain or rhythm disturbance.   Gastrointestinal: Positive for abdominal pain and nausea. Negative for abdominal distention, anal bleeding, blood in stool, constipation, diarrhea and rectal pain.            Results  Specimen to Pathology, Surgery General Surgery (Order [id number])    Result Information    Status Priority Source  Edited Result - FINAL (2022 111) Routine     Pathology Documentation Hyperlink     Show images for Specimen to Pathology, Surgery General Surgery   Reviewed By      Andrea Manning MD on 2022 11:48  Andrea Manning MD on 2022 10:52      Authorizing Provider Information    Name: Andrea Manning MD Fax: [phone]  Phone: [phone] Pager:     All Reviewers List    Andrea Manning MD on 2022 11:48    Specimen to Pathology, Surgery General Surgery  Order:  [id number]  Status: Edited Result - FINAL      Visible to patient: Yes (seen)      Next appt: 07/22/2022 at 11:30 AM in Family Medicine (Elizabet Mayes NP)      0 Result Notes      Component 13 d ago   Final Pathologic Diagnosis   1. Duodenum biopsy:   Duodenal mucosa with intramucosal Brunner glands   No features of sprue   Negative for gastric metaplasia, dysplasia, or malignancy   2. Random gastric biopsy:   Reactive gastropathy with chronic gastritis   No Helicobacter pylori like organisms on routine staining   Immunohistochemical stain for Helicobacter pylori is pending and results will   be reported in a supplemental   Negative for intestinal metaplasia, dysplasia, or malignancy  VC    Comment: Interp By Renu Alford M.D., Signed on 07/11/2022 at 10:48  Supplemental Diagnosis   Appropriately controlled immunohistochemical stain for Helicobacter pylori   performed on Part 2 is negative  VC    Gross   Specimen type: Gastrointestinal Biopsy   Labeled with the patient's name  Gustavo Perry  , medical record number   [id number], and received in 2 separate containers.   Part 1:  Received in formalin, designated duodenum Bx are multiple portions   of pink-tan soft tissue fragments measuring 0.7 cm x 0.6 cm x 0.4 cm  in   aggregate.  The specimen is filtered, stained with Hematoxylin, and is   submitted entirely in cassette KAE--1-A.   Part 2:  Received in formalin, designated random gastric Bx are multiple   portions of pink-tan soft tissue fragments measuring 0.6 cm x 0.6 cm x 0.4 cm    in aggregate.  The specimen is filtered, stained with Hematoxylin, and is   submitted entirely in cassette EOH--2-A.   NILO Marcos,   Grossing Technologist.  VC    Disclaimer   Unless the case is a 'gross only' or additional testing only, the final   diagnosis for each specimen is based on a microscopic examination of   appropriate tissue sections.  VC    [facility]            Narrative  Performed by: [employer]    Pre-op Diagnosis: Gastric ulcer, unspecified chronicity,   unspecified whether gastric ulcer hemorrhage or perforation   present (K25.9)   Procedure(s):   EGD (ESOPHAGOGASTRODUODENOSCOPY)   Number of specimens: 2   Name of specimens: 1) Duodenum Bx 2) Random Gastric Bx   Which provider would you like to cc?->ANDREA MANNING   Release to patient->Immediate   Specimen total (fresh, frozen, permanent):->2    Specimen Collected: 07/05/22 11:03 Last Resulted: 07/11/22 11:12       Lab Flowsheet       Order Details       View Encounter       Lab and Collection Details       Routing       Result History        VC=Value has a corrected status         Result Care Coordination        Patient Communication       Add Comments   Seen Back to Top          Order Provider Info        Office phone Pager E-mail  Ordering User Apple Bailey RN -- -- [email or web address]  Authorizing Provider Andrea Manning [phone] -- --  Attending Provider When Ordered Andrea Manning [phone] --         Genitourinary:        She is undergoing an gyn evaluation.  She has had right lower quadrant discomfort.  Her grandmother apparently had uterine cancer.   Musculoskeletal: Positive for neck pain. Negative for back pain.        She has brain to her neck.  She is on the muscle relaxer in the naproxen.   Skin: Negative for pallor and rash.   Neurological: Negative for dizziness, seizures, syncope, speech difficulty, weakness and numbness.   Hematological: Negative for adenopathy.   Psychiatric/Behavioral: Negative for confusion.       Objective:      Physical Exam  Vitals reviewed.   Constitutional:       Appearance: She is well-developed.      Comments: Well-nourished well-hydrated afebrile nonicteric white female.  She is sitting comfortably in the chair.  She is breathing normally.  She appears to be oriented x3 and can relate her history and answer questions  appropriately.   HENT:      Head: Normocephalic.   Eyes:      Pupils: Pupils are equal, round, and reactive to light.   Neck:      Thyroid: No thyromegaly.      Trachea: No tracheal deviation.   Cardiovascular:      Rate and Rhythm: Normal rate and regular rhythm.      Heart sounds: Normal heart sounds.   Pulmonary:      Effort: Pulmonary effort is normal.      Breath sounds: Normal breath sounds.   Abdominal:      General: Bowel sounds are normal. There is no distension.      Palpations: Abdomen is soft. There is no mass.      Tenderness: There is abdominal tenderness. There is no right CVA tenderness, left CVA tenderness, guarding or rebound.      Hernia: No hernia is present.      Comments: The abdomen is soft with mild tenderness to palpation in the epigastrium and right upper quadrant.  Xie sign is absent.  Bowel sounds are normal.   Musculoskeletal:         General: Normal range of motion.      Cervical back: Normal range of motion and neck supple.      Comments: She can ambulate normally.  She can go from the sitting the standing position without difficulty.  She can get on the exam table without difficulty or assistance.   Lymphadenopathy:      Cervical: No cervical adenopathy.   Skin:     General: Skin is warm and dry.   Neurological:      Mental Status: She is alert and oriented to person, place, and time.      Cranial Nerves: No cranial nerve deficit.   Psychiatric:         Behavior: Behavior normal.           Plan:       Gastric ulcer, unspecified chronicity, unspecified whether gastric ulcer hemorrhage or perforation present  -     pantoprazole (PROTONIX) 40 MG tablet; Take 1 tablet (40 mg total) by mouth once daily.  Dispense: 30 tablet; Refill: 11    Obese abdomen    Upper abdominal pain, unspecified    Nausea    Gastroesophageal reflux disease, unspecified whether esophagitis present    Chronic cholecystitis    She will continue her reflux regimen.  She started on the Protonix.  She follows up  with her other physicians.  She will make a food diary and avoid the offending foods such as the fried and fatty foods.  She will add more fiber to the diet and add more vegetables to the diet and try to decrease her caloric intake to reduce her weight.  She continues her other medications vitamins and minerals.

## 2022-07-22 ENCOUNTER — OFFICE VISIT (OUTPATIENT)
Dept: FAMILY MEDICINE | Facility: CLINIC | Age: 27
End: 2022-07-22
Payer: COMMERCIAL

## 2022-07-22 VITALS
HEIGHT: 67 IN | OXYGEN SATURATION: 99 % | BODY MASS INDEX: 33.48 KG/M2 | WEIGHT: 213.31 LBS | RESPIRATION RATE: 18 BRPM | DIASTOLIC BLOOD PRESSURE: 80 MMHG | SYSTOLIC BLOOD PRESSURE: 114 MMHG | HEART RATE: 83 BPM

## 2022-07-22 DIAGNOSIS — Z12.4 ENCOUNTER FOR PAPANICOLAOU SMEAR FOR CERVICAL CANCER SCREENING: Primary | ICD-10-CM

## 2022-07-22 DIAGNOSIS — N63.0 BREAST LUMP OR MASS: ICD-10-CM

## 2022-07-22 DIAGNOSIS — Z84.89 FAMILY HISTORY OF CARCINOMA IN SITU OF BREAST: ICD-10-CM

## 2022-07-22 DIAGNOSIS — R10.30 LOWER ABDOMINAL PAIN: ICD-10-CM

## 2022-07-22 PROCEDURE — 1160F RVW MEDS BY RX/DR IN RCRD: CPT | Mod: S$GLB,,,

## 2022-07-22 PROCEDURE — 99999 PR PBB SHADOW E&M-EST. PATIENT-LVL IV: ICD-10-PCS | Mod: PBBFAC,,,

## 2022-07-22 PROCEDURE — 99395 PREV VISIT EST AGE 18-39: CPT | Mod: S$GLB,,,

## 2022-07-22 PROCEDURE — 1159F MED LIST DOCD IN RCRD: CPT | Mod: S$GLB,,,

## 2022-07-22 PROCEDURE — 1159F PR MEDICATION LIST DOCUMENTED IN MEDICAL RECORD: ICD-10-PCS | Mod: S$GLB,,,

## 2022-07-22 PROCEDURE — 3044F PR MOST RECENT HEMOGLOBIN A1C LEVEL <7.0%: ICD-10-PCS | Mod: S$GLB,,,

## 2022-07-22 PROCEDURE — 3008F BODY MASS INDEX DOCD: CPT | Mod: S$GLB,,,

## 2022-07-22 PROCEDURE — 3044F HG A1C LEVEL LT 7.0%: CPT | Mod: S$GLB,,,

## 2022-07-22 PROCEDURE — 1160F PR REVIEW ALL MEDS BY PRESCRIBER/CLIN PHARMACIST DOCUMENTED: ICD-10-PCS | Mod: S$GLB,,,

## 2022-07-22 PROCEDURE — 99999 PR PBB SHADOW E&M-EST. PATIENT-LVL IV: CPT | Mod: PBBFAC,,,

## 2022-07-22 PROCEDURE — 88175 CYTOPATH C/V AUTO FLUID REDO: CPT

## 2022-07-22 PROCEDURE — 3008F PR BODY MASS INDEX (BMI) DOCUMENTED: ICD-10-PCS | Mod: S$GLB,,,

## 2022-07-22 PROCEDURE — 99395 PR PREVENTIVE VISIT,EST,18-39: ICD-10-PCS | Mod: S$GLB,,,

## 2022-07-22 PROCEDURE — 3079F PR MOST RECENT DIASTOLIC BLOOD PRESSURE 80-89 MM HG: ICD-10-PCS | Mod: S$GLB,,,

## 2022-07-22 PROCEDURE — 3079F DIAST BP 80-89 MM HG: CPT | Mod: S$GLB,,,

## 2022-07-22 PROCEDURE — 3074F SYST BP LT 130 MM HG: CPT | Mod: S$GLB,,,

## 2022-07-22 PROCEDURE — 87624 HPV HI-RISK TYP POOLED RSLT: CPT

## 2022-07-22 PROCEDURE — 3074F PR MOST RECENT SYSTOLIC BLOOD PRESSURE < 130 MM HG: ICD-10-PCS | Mod: S$GLB,,,

## 2022-07-22 NOTE — PROGRESS NOTES
Subjective:       Patient ID: Gustavo Perry is a 27 y.o. female.    Chief Complaint: Gynecologic Exam (States her right ovary hurts when she gets her period, occasional pain when off her period. Has heavy periods )      Gustavo Perry is a 27 y.o. female who presents to the clinic today for a routine physical exam. Her last physical exam was 2018.  Irregular Pap Hx: No  : 4  Para: 4, 3 vaginal, 1-  LMP: 12  Menstural cycle quality: Heavy, was previously placed on iron d/t anemia from heavy menses.  Post/perimenopausal: No  GYN surgery: Tubal  Contraception: Tubal  Gardasil completed: Uknown  Last Mammogram: N/A. C/o breast tenderness not related to menses and reports mass at medial aspect of right breast and lower lateral aspect of left breast.  Reports sexual intercourse last night.     Health Maintenance Reviewed and updated: UTD other than vaccines, not due for blood work.    Patient Active Problem List   Diagnosis    Rh negative status during pregnancy    History of trichomoniasis    History of PCR DNA positive for HSV2    Encounter for elective induction of labor    Iron deficiency anemia    Upper abdominal pain, unspecified    Nausea    Obese abdomen    Gastroesophageal reflux disease    Gastric ulcer    Chronic cholecystitis        Past Medical History:   Diagnosis Date    Anxiety     Bipolar 1 disorder, depressed     Depression     PTSD (post-traumatic stress disorder)         Past Surgical History:   Procedure Laterality Date     SECTION      ESOPHAGOGASTRODUODENOSCOPY N/A 2022    Procedure: EGD (ESOPHAGOGASTRODUODENOSCOPY);  Surgeon: Andrea Manning MD;  [facility];  Service: Endoscopy;  Laterality: N/A;    TONSILLECTOMY, ADENOIDECTOMY      TUBAL LIGATION      WISDOM TOOTH EXTRACTION          Family History   Problem Relation Age of Onset    Hypertension Mother     No Known Problems Father     Cancer Maternal Grandmother     Cancer  Paternal Grandmother     Depression Brother         Social History     Socioeconomic History    Marital status:    Tobacco Use    Smoking status: Current Every Day Smoker     Packs/day: 1.00     Years: 15.00     Pack years: 15.00     Types: Cigarettes    Smokeless tobacco: Never Used   Substance and Sexual Activity    Alcohol use: Not Currently     Comment: socially    Drug use: Never    Sexual activity: Yes     Partners: Male     Birth control/protection: See Surgical Hx, None     Comment: Tubal Ligation -         Social History     Tobacco Use   Smoking Status Current Every Day Smoker    Packs/day: 1.00    Years: 15.00    Pack years: 15.00    Types: Cigarettes   Smokeless Tobacco Never Used        Allergies as of 07/22/2022 - Reviewed 07/22/2022   Allergen Reaction Noted    Sulfa (sulfonamide antibiotics) Hives 04/27/2018        Current Outpatient Medications on File Prior to Visit   Medication Sig Dispense Refill    iron-vitamin C 100-250 mg, ICAR-C, (ICAR-C) 100-250 mg Tab Take 1 tablet by mouth once daily at 6am. 30 tablet 11    ondansetron (ZOFRAN-ODT) 4 MG TbDL Take 1 tablet (4 mg total) by mouth every 6 (six) hours as needed (nausea or vomiting). 30 tablet 1    pantoprazole (PROTONIX) 40 MG tablet Take 1 tablet (40 mg total) by mouth once daily. 30 tablet 11     Current Facility-Administered Medications on File Prior to Visit   Medication Dose Route Frequency Provider Last Rate Last Admin    ceFOXItin (MEFOXIN) 2 g in dextrose 5 % 50 mL IVPB  2 g Intravenous On Call Procedure Eric Huston MD            Review of Systems   Constitutional: Negative for chills and fever.   HENT: Negative for congestion.    Respiratory: Negative for cough and shortness of breath.    Cardiovascular: Negative for chest pain and leg swelling.   Gastrointestinal: Positive for abdominal pain. Negative for constipation, diarrhea, nausea and vomiting.        Worse on right during menses.   "  Genitourinary: Negative for difficulty urinating.   Neurological: Negative for dizziness and headaches.   All other systems reviewed and are negative.          Objective:      /80 (BP Location: Left arm, Patient Position: Sitting, BP Method: Medium (Automatic))   Pulse 83   Resp 18   Ht 5' 7" (1.702 m)   Wt 96.8 kg (213 lb 4.8 oz)   LMP 07/06/2022 (Exact Date)   SpO2 99%   BMI 33.41 kg/m²   Physical Exam  Vitals and nursing note reviewed. Exam conducted with a chaperone present (CAROLIN Willis MA).   Constitutional:       General: She is awake.      Appearance: Normal appearance. She is well-developed.   HENT:      Head: Normocephalic and atraumatic.      Nose: Nose normal.   Eyes:      Pupils: Pupils are equal, round, and reactive to light.   Cardiovascular:      Rate and Rhythm: Normal rate and regular rhythm.      Heart sounds: Normal heart sounds.   Pulmonary:      Effort: Pulmonary effort is normal.      Breath sounds: Normal breath sounds.   Chest:      Chest wall: Mass and tenderness present.   Breasts:      Breasts are asymmetrical.      Right: Tenderness present. No nipple discharge or skin change.      Left: Tenderness present. No nipple discharge or skin change.         Abdominal:      General: Abdomen is flat.   Genitourinary:     General: Normal vulva.      Exam position: Lithotomy position.      Pubic Area: No rash.       Vagina: Normal.      Cervix: Discharge present.      Uterus: Tender.    Musculoskeletal:         General: Normal range of motion.      Cervical back: Normal range of motion.   Skin:     General: Skin is warm and dry.   Neurological:      Mental Status: She is alert and oriented to person, place, and time.   Psychiatric:         Mood and Affect: Mood normal.         Behavior: Behavior normal. Behavior is cooperative.         Thought Content: Thought content normal.         Judgment: Judgment normal.         Assessment and Plan:       Gustavo was seen today for gynecologic " exam.    Diagnoses and all orders for this visit:    Encounter for Papanicolaou smear for cervical cancer screening  -     US Abdomen Limited; Future  -     HPV High Risk Genotypes, PCR  -     Liquid-Based Pap Smear, Screening    Lower abdominal pain  -     US Abdomen Limited; Future    Breast lump or mass  -     Mammo Digital Diagnostic Bilat; Future  -     US Breast Bilateral Limited; Future    Family history of carcinoma in situ of breast  -     Mammo Digital Diagnostic Bilat; Future  -     US Breast Bilateral Limited; Future        PLAN: Applies to all diagnosis and orders listed above.  -   - Follow up as needed.     Discussed with patient the plan of care. Medications reviewed. Medication side effects discussed. Patient has no questions or concerns at this time. Reviewed, monitored, evaluated, and assessed chronic conditions as outlined above. Reviewed family, medical, surgical, social history, and any recent labs or imaging performed in the last 3 months. Informed patient to please notify me with any questions or concerns at anytime.    Thank you,  SANTOS Harvey  Family Medicine  [facility]

## 2022-07-27 ENCOUNTER — ANESTHESIA (OUTPATIENT)
Dept: SURGERY | Facility: HOSPITAL | Age: 27
End: 2022-07-27
Payer: COMMERCIAL

## 2022-07-27 ENCOUNTER — HOSPITAL ENCOUNTER (OUTPATIENT)
Facility: HOSPITAL | Age: 27
Discharge: HOME | End: 2022-07-27
Attending: SURGERY | Admitting: SURGERY
Payer: COMMERCIAL

## 2022-07-27 ENCOUNTER — ANESTHESIA EVENT (OUTPATIENT)
Dept: SURGERY | Facility: HOSPITAL | Age: 27
End: 2022-07-27
Payer: COMMERCIAL

## 2022-07-27 DIAGNOSIS — K82.8 BILIARY DYSKINESIA: Primary | ICD-10-CM

## 2022-07-27 LAB
CTP QC/QA: YES
SARS-COV-2 AG RESP QL IA.RAPID: NEGATIVE

## 2022-07-27 PROCEDURE — 27201423 OPTIME MED/SURG SUP & DEVICES STERILE SUPPLY: Performed by: SURGERY

## 2022-07-27 PROCEDURE — 63600175 PHARM REV CODE 636 W HCPCS: Performed by: ANESTHESIOLOGY

## 2022-07-27 PROCEDURE — 71000015 HC POSTOP RECOV 1ST HR: Performed by: SURGERY

## 2022-07-27 PROCEDURE — D9220A PRA ANESTHESIA: ICD-10-PCS | Mod: CRNA,,, | Performed by: NURSE ANESTHETIST, CERTIFIED REGISTERED

## 2022-07-27 PROCEDURE — 25000003 PHARM REV CODE 250: Performed by: NURSE ANESTHETIST, CERTIFIED REGISTERED

## 2022-07-27 PROCEDURE — D9220A PRA ANESTHESIA: ICD-10-PCS | Mod: ANES,,, | Performed by: ANESTHESIOLOGY

## 2022-07-27 PROCEDURE — 63600175 PHARM REV CODE 636 W HCPCS

## 2022-07-27 PROCEDURE — 88304 TISSUE EXAM BY PATHOLOGIST: CPT | Mod: 26,,, | Performed by: PATHOLOGY

## 2022-07-27 PROCEDURE — 47562 LAPAROSCOPIC CHOLECYSTECTOMY: CPT | Mod: ,,, | Performed by: SURGERY

## 2022-07-27 PROCEDURE — 47562 PR LAP,CHOLECYSTECTOMY: ICD-10-PCS | Mod: ,,, | Performed by: SURGERY

## 2022-07-27 PROCEDURE — D9220A PRA ANESTHESIA: Mod: CRNA,,, | Performed by: NURSE ANESTHETIST, CERTIFIED REGISTERED

## 2022-07-27 PROCEDURE — 63600175 PHARM REV CODE 636 W HCPCS: Performed by: NURSE ANESTHETIST, CERTIFIED REGISTERED

## 2022-07-27 PROCEDURE — 88304 TISSUE EXAM BY PATHOLOGIST: CPT | Performed by: PATHOLOGY

## 2022-07-27 PROCEDURE — 25000003 PHARM REV CODE 250: Performed by: SURGERY

## 2022-07-27 PROCEDURE — 88304 PR  SURG PATH,LEVEL III: ICD-10-PCS | Mod: 26,,, | Performed by: PATHOLOGY

## 2022-07-27 PROCEDURE — 37000008 HC ANESTHESIA 1ST 15 MINUTES: Performed by: SURGERY

## 2022-07-27 PROCEDURE — D9220A PRA ANESTHESIA: Mod: ANES,,, | Performed by: ANESTHESIOLOGY

## 2022-07-27 PROCEDURE — 36000709 HC OR TIME LEV III EA ADD 15 MIN: Performed by: SURGERY

## 2022-07-27 PROCEDURE — 25000003 PHARM REV CODE 250: Performed by: ANESTHESIOLOGY

## 2022-07-27 PROCEDURE — 71000033 HC RECOVERY, INTIAL HOUR: Performed by: SURGERY

## 2022-07-27 PROCEDURE — 37000009 HC ANESTHESIA EA ADD 15 MINS: Performed by: SURGERY

## 2022-07-27 PROCEDURE — 36000708 HC OR TIME LEV III 1ST 15 MIN: Performed by: SURGERY

## 2022-07-27 PROCEDURE — 71000039 HC RECOVERY, EACH ADD'L HOUR: Performed by: SURGERY

## 2022-07-27 RX ORDER — OXYCODONE AND ACETAMINOPHEN 5; 325 MG/1; MG/1
1 TABLET ORAL EVERY 6 HOURS PRN
Qty: 30 TABLET | Refills: 0 | Status: SHIPPED | OUTPATIENT
Start: 2022-07-27 | End: 2022-08-06

## 2022-07-27 RX ORDER — LIDOCAINE HYDROCHLORIDE 10 MG/ML
1 INJECTION, SOLUTION EPIDURAL; INFILTRATION; INTRACAUDAL; PERINEURAL ONCE
Status: COMPLETED | OUTPATIENT
Start: 2022-07-27 | End: 2022-07-27

## 2022-07-27 RX ORDER — SODIUM CHLORIDE 9 MG/ML
INJECTION, SOLUTION INTRAVENOUS CONTINUOUS
Status: DISCONTINUED | OUTPATIENT
Start: 2022-07-27 | End: 2022-07-27 | Stop reason: HOSPADM

## 2022-07-27 RX ORDER — KETOROLAC TROMETHAMINE 30 MG/ML
INJECTION, SOLUTION INTRAMUSCULAR; INTRAVENOUS
Status: DISCONTINUED | OUTPATIENT
Start: 2022-07-27 | End: 2022-07-27

## 2022-07-27 RX ORDER — BUPIVACAINE HYDROCHLORIDE 2.5 MG/ML
INJECTION, SOLUTION EPIDURAL; INFILTRATION; INTRACAUDAL
Status: DISCONTINUED | OUTPATIENT
Start: 2022-07-27 | End: 2022-07-27 | Stop reason: HOSPADM

## 2022-07-27 RX ORDER — SODIUM CHLORIDE, SODIUM LACTATE, POTASSIUM CHLORIDE, CALCIUM CHLORIDE 600; 310; 30; 20 MG/100ML; MG/100ML; MG/100ML; MG/100ML
INJECTION, SOLUTION INTRAVENOUS CONTINUOUS
Status: DISCONTINUED | OUTPATIENT
Start: 2022-07-27 | End: 2022-07-27 | Stop reason: HOSPADM

## 2022-07-27 RX ORDER — SODIUM CHLORIDE, SODIUM LACTATE, POTASSIUM CHLORIDE, CALCIUM CHLORIDE 600; 310; 30; 20 MG/100ML; MG/100ML; MG/100ML; MG/100ML
125 INJECTION, SOLUTION INTRAVENOUS CONTINUOUS
Status: DISCONTINUED | OUTPATIENT
Start: 2022-07-27 | End: 2022-07-27 | Stop reason: HOSPADM

## 2022-07-27 RX ORDER — PROPOFOL 10 MG/ML
VIAL (ML) INTRAVENOUS
Status: DISCONTINUED | OUTPATIENT
Start: 2022-07-27 | End: 2022-07-27

## 2022-07-27 RX ORDER — ONDANSETRON 2 MG/ML
INJECTION INTRAMUSCULAR; INTRAVENOUS
Status: DISCONTINUED | OUTPATIENT
Start: 2022-07-27 | End: 2022-07-27

## 2022-07-27 RX ORDER — ROCURONIUM BROMIDE 10 MG/ML
INJECTION, SOLUTION INTRAVENOUS
Status: DISCONTINUED | OUTPATIENT
Start: 2022-07-27 | End: 2022-07-27

## 2022-07-27 RX ORDER — ONDANSETRON 2 MG/ML
4 INJECTION INTRAMUSCULAR; INTRAVENOUS DAILY PRN
Status: DISCONTINUED | OUTPATIENT
Start: 2022-07-27 | End: 2022-07-27 | Stop reason: HOSPADM

## 2022-07-27 RX ORDER — ONDANSETRON 4 MG/1
4 TABLET, FILM COATED ORAL EVERY 6 HOURS PRN
Qty: 30 TABLET | Refills: 0 | Status: SHIPPED | OUTPATIENT
Start: 2022-07-27 | End: 2022-08-06

## 2022-07-27 RX ORDER — DEXAMETHASONE SODIUM PHOSPHATE 4 MG/ML
INJECTION, SOLUTION INTRA-ARTICULAR; INTRALESIONAL; INTRAMUSCULAR; INTRAVENOUS; SOFT TISSUE
Status: DISCONTINUED | OUTPATIENT
Start: 2022-07-27 | End: 2022-07-27

## 2022-07-27 RX ORDER — DIPHENHYDRAMINE HYDROCHLORIDE 50 MG/ML
12.5 INJECTION INTRAMUSCULAR; INTRAVENOUS
Status: DISCONTINUED | OUTPATIENT
Start: 2022-07-27 | End: 2022-07-27 | Stop reason: HOSPADM

## 2022-07-27 RX ORDER — MORPHINE SULFATE 4 MG/ML
INJECTION, SOLUTION INTRAMUSCULAR; INTRAVENOUS
Status: COMPLETED
Start: 2022-07-27 | End: 2022-07-27

## 2022-07-27 RX ORDER — CEFOXITIN SODIUM 2 G/50ML
INJECTION, SOLUTION INTRAVENOUS
Status: DISCONTINUED | OUTPATIENT
Start: 2022-07-27 | End: 2022-07-27

## 2022-07-27 RX ORDER — MIDAZOLAM HYDROCHLORIDE 1 MG/ML
INJECTION INTRAMUSCULAR; INTRAVENOUS
Status: DISCONTINUED | OUTPATIENT
Start: 2022-07-27 | End: 2022-07-27

## 2022-07-27 RX ORDER — MORPHINE SULFATE 4 MG/ML
INJECTION, SOLUTION INTRAMUSCULAR; INTRAVENOUS
Status: DISCONTINUED
Start: 2022-07-27 | End: 2022-07-27 | Stop reason: HOSPADM

## 2022-07-27 RX ORDER — FENTANYL CITRATE 50 UG/ML
INJECTION, SOLUTION INTRAMUSCULAR; INTRAVENOUS
Status: DISCONTINUED | OUTPATIENT
Start: 2022-07-27 | End: 2022-07-27

## 2022-07-27 RX ORDER — CEFOXITIN 2 G/1
2 INJECTION, POWDER, FOR SOLUTION INTRAVENOUS ONCE
Status: DISCONTINUED | OUTPATIENT
Start: 2022-07-27 | End: 2022-07-27 | Stop reason: HOSPADM

## 2022-07-27 RX ORDER — MORPHINE SULFATE 4 MG/ML
2 INJECTION, SOLUTION INTRAMUSCULAR; INTRAVENOUS EVERY 5 MIN PRN
Status: DISCONTINUED | OUTPATIENT
Start: 2022-07-27 | End: 2022-07-27 | Stop reason: HOSPADM

## 2022-07-27 RX ADMIN — PROPOFOL 200 MG: 10 INJECTION, EMULSION INTRAVENOUS at 11:07

## 2022-07-27 RX ADMIN — SUGAMMADEX 100 MG: 100 INJECTION, SOLUTION INTRAVENOUS at 12:07

## 2022-07-27 RX ADMIN — MORPHINE SULFATE 2 MG: 4 INJECTION INTRAVENOUS at 01:07

## 2022-07-27 RX ADMIN — ONDANSETRON 4 MG: 2 INJECTION INTRAMUSCULAR; INTRAVENOUS at 12:07

## 2022-07-27 RX ADMIN — MORPHINE SULFATE 2 MG: 4 INJECTION INTRAVENOUS at 12:07

## 2022-07-27 RX ADMIN — KETOROLAC TROMETHAMINE 30 MG: 30 INJECTION, SOLUTION INTRAMUSCULAR; INTRAVENOUS at 11:07

## 2022-07-27 RX ADMIN — ROCURONIUM BROMIDE 30 MG: 10 INJECTION, SOLUTION INTRAVENOUS at 11:07

## 2022-07-27 RX ADMIN — SODIUM CHLORIDE, POTASSIUM CHLORIDE, SODIUM LACTATE AND CALCIUM CHLORIDE: 600; 310; 30; 20 INJECTION, SOLUTION INTRAVENOUS at 11:07

## 2022-07-27 RX ADMIN — DEXAMETHASONE SODIUM PHOSPHATE 4 MG: 4 INJECTION, SOLUTION INTRAMUSCULAR; INTRAVENOUS at 11:07

## 2022-07-27 RX ADMIN — FENTANYL CITRATE 25 MCG: 50 INJECTION, SOLUTION INTRAMUSCULAR; INTRAVENOUS at 12:07

## 2022-07-27 RX ADMIN — FENTANYL CITRATE 100 MCG: 50 INJECTION, SOLUTION INTRAMUSCULAR; INTRAVENOUS at 11:07

## 2022-07-27 RX ADMIN — ONDANSETRON 4 MG: 2 INJECTION INTRAMUSCULAR; INTRAVENOUS at 11:07

## 2022-07-27 RX ADMIN — CEFOXITIN SODIUM 2 G: 2 INJECTION, SOLUTION INTRAVENOUS at 11:07

## 2022-07-27 RX ADMIN — LIDOCAINE HYDROCHLORIDE 50 ML: 10 INJECTION, SOLUTION EPIDURAL; INFILTRATION; INTRACAUDAL; PERINEURAL at 11:07

## 2022-07-27 RX ADMIN — GLYCOPYRROLATE 0.2 MG: 0.2 INJECTION INTRAMUSCULAR; INTRAVENOUS at 11:07

## 2022-07-27 RX ADMIN — ROCURONIUM BROMIDE 10 MG: 10 INJECTION, SOLUTION INTRAVENOUS at 11:07

## 2022-07-27 RX ADMIN — MIDAZOLAM HYDROCHLORIDE 2 MG: 1 INJECTION, SOLUTION INTRAMUSCULAR; INTRAVENOUS at 11:07

## 2022-07-27 NOTE — DISCHARGE SUMMARY
Discharge Note        SUMMARY     Admit Date: 7/27/2022    Attending Physician: Erci Huston MD     Discharge Physician: Eric Huston MD    Discharge Date: 7/27/2022 12:04 PM      Hospital Course: Patient tolerated procedure well.     Disposition: Home or Self Care    Patient Instructions:   Current Discharge Medication List      START taking these medications    Details   ondansetron (ZOFRAN) 4 MG tablet Take 1 tablet (4 mg total) by mouth every 6 (six) hours as needed for Nausea.  Qty: 30 tablet, Refills: 0      oxyCODONE-acetaminophen (PERCOCET) 5-325 mg per tablet Take 1 tablet by mouth every 6 (six) hours as needed for Pain.  Qty: 30 tablet, Refills: 0    Comments: n/a          CONTINUE these medications which have NOT CHANGED    Details   iron-vitamin C 100-250 mg, ICAR-C, (ICAR-C) 100-250 mg Tab Take 1 tablet by mouth once daily at 6am.  Qty: 30 tablet, Refills: 11    Associated Diagnoses: Iron deficiency      pantoprazole (PROTONIX) 40 MG tablet Take 1 tablet (40 mg total) by mouth once daily.  Qty: 30 tablet, Refills: 11    Associated Diagnoses: Gastric ulcer, unspecified chronicity, unspecified whether gastric ulcer hemorrhage or perforation present      ondansetron (ZOFRAN-ODT) 4 MG TbDL Take 1 tablet (4 mg total) by mouth every 6 (six) hours as needed (nausea or vomiting).  Qty: 30 tablet, Refills: 1    Associated Diagnoses: RUQ abdominal pain; Nausea             Discharge Procedure Orders (must include Diet, Follow-up, Activity):   Discharge Procedure Orders (must include Diet, Follow-up, Activity)   Diet general     Lifting restrictions   Order Comments: No heavy lifting, pushing, pulling, bending, strenuous exercise greater than 10 lb for the next 6 weeks.     Other restrictions (specify):   Order Comments: No swimming or submersion of wounds in lakes, ponds, streams, oceans, bathtubs, etc until seen in clinic for postoperative evaluation.     No dressing needed     Call MD for:   temperature >100.4     Call MD for:  persistent nausea and vomiting     Call MD for:  severe uncontrolled pain     Call MD for:  difficulty breathing, headache or visual disturbances     Call MD for:  redness, tenderness, or signs of infection (pain, swelling, redness, odor or green/yellow discharge around incision site)     Call MD for:  persistent dizziness or light-headedness        Follow Up:  Follow up as scheduled.  Resume routine diet.  Activity as tolerated.    No driving day of procedure.

## 2022-07-27 NOTE — ANESTHESIA POSTPROCEDURE EVALUATION
Anesthesia Post Evaluation    Patient: Gustavo Perry    Procedure(s) Performed: Procedure(s) (LRB):  CHOLECYSTECTOMY-LAPAROSCOPIC (Bilateral)    Final Anesthesia Type: general      Patient location during evaluation: PACU  Patient participation: Yes- Able to Participate  Level of consciousness: awake and alert  Post-procedure vital signs: reviewed and stable  Pain management: adequate  Airway patency: patent    PONV status at discharge: No PONV  Anesthetic complications: no      Cardiovascular status: blood pressure returned to baseline  Respiratory status: unassisted  Hydration status: euvolemic  Follow-up not needed.          Vitals Value Taken Time   /76 07/27/22 1332   Temp 36.4 °C (97.5 °F) 07/27/22 1215   Pulse 68 07/27/22 1332   Resp 7 07/27/22 1332   SpO2 100 % 07/27/22 1332   Vitals shown include unvalidated device data.      Event Time   Out of Recovery 13:29:29         Pain/Marcos Score: Pain Rating Prior to Med Admin: 7 (7/27/2022  1:05 PM)  Marcos Score: 10 (7/27/2022  1:35 PM)

## 2022-07-27 NOTE — TRANSFER OF CARE
"Anesthesia Transfer of Care Note    Patient: Gustavo Perry    Procedure(s) Performed: Procedure(s) (LRB):  CHOLECYSTECTOMY-LAPAROSCOPIC (Bilateral)    Patient location: PACU    Anesthesia Type: general    Transport from OR: Transported from OR on room air with adequate spontaneous ventilation    Post pain: adequate analgesia    Post assessment: no apparent anesthetic complications and tolerated procedure well    Post vital signs: stable    Level of consciousness: awake, alert and oriented    Nausea/Vomiting: no nausea/vomiting    Complications: none    Transfer of care protocol was followed      Last vitals:   Visit Vitals  /78 (BP Location: Left arm, Patient Position: Sitting)   Pulse 78   Temp 36.8 °C (98.3 °F) (Oral)   Resp 15   Ht 5' 7" (1.702 m)   Wt 92.5 kg (204 lb)   LMP 07/06/2022 (Exact Date)   SpO2 99%   Breastfeeding No   BMI 31.95 kg/m²     "

## 2022-07-27 NOTE — ANESTHESIA PROCEDURE NOTES
Intubation    Date/Time: 7/27/2022 11:22 AM  Performed by: Aydee Cottrell CRNA  Authorized by: Sukhdev Gallego MD     Intubation:     Induction:  Intravenous    Intubated:  Postinduction    Mask Ventilation:  Easy mask    Attempts:  1    Attempted By:  CRNA    Method of Intubation:  Direct    Blade:  Jenkins 2    Laryngeal View Grade: Grade I - full view of cords      Difficult Airway Encountered?: No      Complications:  None    Airway Device:  Oral endotracheal tube    Airway Device Size:  7.0    Style/Cuff Inflation:  Cuffed (inflated to minimal occlusive pressure)    Tube secured:  22    Secured at:  The lips    Placement Verified By:  Capnometry    Complicating Factors:  None    Findings Post-Intubation:  BS equal bilateral and atraumatic/condition of teeth unchanged

## 2022-07-27 NOTE — ANESTHESIA PREPROCEDURE EVALUATION
07/27/2022  Gustavo Perry is a 27 y.o., female.      Pre-op Assessment    I have reviewed the Patient Summary Reports.     I have reviewed the Nursing Notes. I have reviewed the NPO Status.   I have reviewed the Medications.     Review of Systems  Social:  Smoker    Hematology/Oncology:  Hematology Normal   Oncology Normal     EENT/Dental:EENT/Dental Normal   Cardiovascular:  Cardiovascular Normal     Pulmonary:  Pulmonary Normal    Hepatic/GI:   PUD, GERD    Musculoskeletal:  Musculoskeletal Normal    Neurological:  Neurology Normal    Endocrine:  Endocrine Normal    Psych:   Psychiatric History (bipolar) anxiety depression          Physical Exam    Airway:  Mallampati: II   Mouth Opening: Normal  TM Distance: Normal  Tongue: Normal  Neck ROM: Normal ROM    Chest/Lungs:  Clear to auscultation    Heart:  Rate: Normal  Rhythm: Regular Rhythm        Anesthesia Plan  Type of Anesthesia, risks & benefits discussed:    Anesthesia Type: Gen ETT  Intra-op Monitoring Plan: Standard ASA Monitors  Post Op Pain Control Plan: multimodal analgesia  Induction:  IV  Airway Plan: Direct  Informed Consent: Informed consent signed with the Patient and all parties understand the risks and agree with anesthesia plan.  All questions answered.   ASA Score: 2  Day of Surgery Review of History & Physical: H&P Update referred to the surgeon/provider.    Ready For Surgery From Anesthesia Perspective.     .

## 2022-07-27 NOTE — H&P
[facility] H&P Note    Subjective:       Patient ID: Gustavo Perry is a 27 y.o. female.    Chief Complaint:  I want my gallbladder  HPI:  Gustavo Perry is a 27 y.o. female out.  History of anxiety depression presents today as an established patient for evaluation of right upper quadrant pain.  Patient since last visit has undergone HIDA scan.  HIDA scan shows evidence of low ejection fraction of gallbladder consistent with biliary dyskinesia.  Patient with reproduction of symptoms associated with CCK administration portion examination.  Patient stated that she had severe right upper quadrant pain after the test after she got home.  Nausea no vomiting.  Given the above, patient presents today for follow-up evaluation desired to proceed with gallbladder surgery.    Past Medical History:   Diagnosis Date    Anxiety     Bipolar 1 disorder, depressed     Depression     PTSD (post-traumatic stress disorder)      Past Surgical History:   Procedure Laterality Date     SECTION      ESOPHAGOGASTRODUODENOSCOPY N/A 2022    Procedure: EGD (ESOPHAGOGASTRODUODENOSCOPY);  Surgeon: Andrea Manning MD;  Location: [facility];  Service: Endoscopy;  Laterality: N/A;    TONSILLECTOMY, ADENOIDECTOMY      TUBAL LIGATION      WISDOM TOOTH EXTRACTION       Family History   Problem Relation Age of Onset    Hypertension Mother     No Known Problems Father     Cancer Maternal Grandmother     Cancer Paternal Grandmother     Depression Brother      Social History     Socioeconomic History    Marital status:    Tobacco Use    Smoking status: Current Every Day Smoker     Packs/day: 1.00     Years: 15.00     Pack years: 15.00     Types: Cigarettes    Smokeless tobacco: Never Used   Substance and Sexual Activity    Alcohol use: Not Currently     Comment: socially    Drug use: Never    Sexual activity: Yes     Partners: Male     Birth control/protection: See Surgical Hx, None     Comment: Tubal  Ligation -        No current facility-administered medications for this encounter.     Review of patient's allergies indicates:   Allergen Reactions    Sulfa (sulfonamide antibiotics) Hives       Review of Systems   Constitutional: Negative for activity change, appetite change, chills and fever.   HENT: Negative for congestion, dental problem and ear discharge.    Eyes: Negative for discharge and itching.   Respiratory: Negative for apnea, choking and chest tightness.    Cardiovascular: Negative for chest pain and leg swelling.   Gastrointestinal: Positive for abdominal pain. Negative for abdominal distention, anal bleeding, constipation, diarrhea and nausea.   Endocrine: Negative for cold intolerance and heat intolerance.   Genitourinary: Negative for difficulty urinating and dyspareunia.   Musculoskeletal: Negative for arthralgias and back pain.   Skin: Negative for color change and pallor.   Neurological: Negative for dizziness and facial asymmetry.   Hematological: Negative for adenopathy. Does not bruise/bleed easily.   Psychiatric/Behavioral: Negative for agitation and behavioral problems.       Objective:      There were no vitals filed for this visit.  Physical Exam  Constitutional:       General: She is not in acute distress.     Appearance: She is well-developed.   HENT:      Head: Normocephalic and atraumatic.   Eyes:      Pupils: Pupils are equal, round, and reactive to light.   Neck:      Thyroid: No thyromegaly.   Cardiovascular:      Rate and Rhythm: Normal rate and regular rhythm.   Pulmonary:      Effort: Pulmonary effort is normal.      Breath sounds: Normal breath sounds.   Abdominal:      General: Bowel sounds are normal. There is no distension.      Palpations: Abdomen is soft.      Tenderness: There is abdominal tenderness in the right upper quadrant.   Musculoskeletal:         General: No deformity. Normal range of motion.      Cervical back: Normal range of motion and neck supple.    Skin:     General: Skin is warm.      Capillary Refill: Capillary refill takes less than 2 seconds.      Findings: No erythema.   Neurological:      Mental Status: She is alert and oriented to person, place, and time.      Cranial Nerves: No cranial nerve deficit.   Psychiatric:         Behavior: Behavior normal.         Lab Review:   CBC:   Lab Results   Component Value Date    WBC 6.46 05/16/2022    RBC 4.52 05/16/2022    HGB 11.9 (L) 05/16/2022    HCT 36.0 (L) 05/16/2022     05/16/2022     BMP:   Lab Results   Component Value Date    GLU 85 05/16/2022     05/16/2022    K 3.7 05/16/2022     05/16/2022    CO2 25 05/16/2022    BUN 9 05/16/2022    CREATININE 0.9 05/16/2022    CALCIUM 9.1 05/16/2022     Diagnostics Review: HIDA scan reviewed.  Evidence of low ejection fraction of gallbladder.  Reproduction of symptoms associated with CCK administration portion       Medical Decision Making/Counseling:  Patient with evidence of biliary colic with biliary dyskinesia.  Patient offered low-fat diet versus cholecystectomy.  Risk benefits were discussed in detail the patient clinic today.  After risk benefits discussion, patient voiced understanding risk benefits and desires proceed with surgical cholecystectomy.  All questions were answered patient satisfaction.    Risks and benefits of cholecystectomy were discussed with the patient. Benefits the patient could receive would be the resolution of gallbladder attacks causing pain, cramps, nausea vomiting etc.  Benefits also include eliminating the risk of the patient presenting through the ER with acute cholecystitis and needing an emergent operation.  Risks of cholecystectomy were also included in our discussion.  Risks include injury to the common bile duct (liver drain tube) occurring in 1 in 250 cholecystectomy is performed across the [place].  I discussed with the patient that if this were to occur she could need further surgeries to include  likely a hepaticojejunostomy.  I discussed there is a possibility of transfer for surgical therapy for this, if it were to occur.  I also discussed the risk of conversion to an open procedure (cold fashion surgery, how gallbladders were taken out previously) to occur in 5% of cholecystectomies.  I discussed that in 100% cholecystectomies I started with the small incision (laparoscopic technique).  Reason for conversion to an open procedure is related to bleeding, significant scarring or inflammation, inability to obtain a critical view which could lead to injury of surrounding structures to include the stomach, duodenum, IVC, common bile duct, etc.  I discussed there is also a possibility of postoperatively needing reoperation.  Additionally, I have discussed with the patient the possibilities of biliary leak after laparoscopic cholecystectomy.  Literature would suggest a 2% leak rate.  This could necessitate a postoperative ERCP or even postoperative procedure including diagnostic laparoscopy for drainage or percutaneous drainage.  There is also the intrinsic risks of any surgery to include bleeding and infection.  Patient understands all the above risks and benefits and wishes to proceed in the near future with laparoscopic versus open cholecystectomy.  Counseling time 60 minutes in clinic.  I drew a picture for the patient of the foregut liver biliary anatomy and tree for further understanding while in clinic.    Patient instructed that best way to communicate with my office staff is for patient to get on the [facility] patient portal to expedite communication and communication issues that may occur.  Patient was given instructions on how to get on the portal.  I encouraged patient to obtain portal access as well.  Ultimately it is up to the patient to obtain access.  Patient voiced understanding.

## 2022-07-27 NOTE — OP NOTE
[facility] - Surgery  Operative Note     SUMMARY     Surgery Date: 7/27/2022     Pre-op Diagnosis:  Biliary dyskinesia [K82.8]    Post-op Diagnosis:  Post-Op Diagnosis Codes:     * Biliary dyskinesia [K82.8]    Procedure(s) (LRB):  CHOLECYSTECTOMY-LAPAROSCOPIC (Bilateral)    Surgeon(s) and Role:     * Eric Huston MD - Primary    Assistant: None    Antibiotics: Mefoxin 2 gram IV    Estimated Blood Loss: 10 mL    Anesthesia:  General    Description of the findings of the procedure:  Chronic cholecystitis.  Critical view of safety was achieved ensured.      Specimens:  Gallbladder.    Complications:  None apparent in the operative room.    Implants:  None.         Indications for Procedure:     Ms Perry is a 26yo F presented to the clinic for evaluation of abdominal symptomatology.  She underwent workup.  Workup revealed evidence of biliary dyskinesia.  Patient was offered surgical cholecystectomy.  Risk benefits were discussed.  Additionally low-fat diet was offered and discussed.  After informed discussion, patient voiced understanding risk benefits desires to proceed today with surgical cholecystectomy.  All questions were answered to patient's satisfaction.    Procedure:     PROCEDURE IN DETAIL:  The patient was brought back into the Operative Room,  placed on table in supine position.  General anesthesia was introduced via  the endotracheal tube.  A timeout was then conducted with all in the  Operating Room in agreement, correct patient, correct procedure, correct  allergies and correct antibiotics.  The patient was then given 2g mefoxin  IV.  The patient's abdomen was then prepped and draped in the standard  sterile surgical fashion.    I then made a periumbilical incision.   Incision was carried down to the fascia with Bovie  electrocautery.  Towel clips were used to elevate the umbilical stalk.  The  fascia was entered in a vertical fashion, two 0 Vicryl sutures were used  inferior and superior to the  fascial incision sites as stay sutures.   Bennett trocar was placed after a finger sweep was conducted, which  confirmed no adhesions to the abdominal wall.  The abdomen was then  insufflated to 15 mmHg through the Bennett trocar without evidence of  bradycardia episodes of the patient.  The patient was then placed in a head  up, left lateral position.    I then placed three additional trocars in the patient's right upper  quadrant under direct visualization.  These were 5-mm trocars.  I then  placed the assistant's port on the dome of the gallbladder and retracted it  superiorly.  The gallbladder showed signs of chronic cholecystitis.  At this point, I  then placed my retractor on the infundibulum of the gallbladder and  retracted it inferolaterally.  The cystic duct structures were then bluntly  dissected out with the [place] retractor.  Cystic duct and cystic artery  were visualized.  Cystic artery was behind the node of Calot.  The cystic  duct and cystic artery were cleaned off and a critical view of safety was  achieved with the liver seen behind both structures and in between both  structures.  Two clips were placed proximally on both structures, one clip  distally.  Both structures were transected distally.  The gallbladder was  then retracted with adequate tension to allow for the Bovie electrocautery  to remove the gallbladder from the liver bed. The gallbladder was not entered.  Bile was not spilled.  The gallbladder was then dissected off, bovied off  the liver bed, it was placed in an EndoCatch bag.  EndoCatch bag was then  removed through the Bennett trocar. The gallbladder was handed off for permanent  section.  The liver was then retracted superiorly with a grasper.  The  gallbladder bed was washed out copiously with  irrigant.  Hemostasis was  achieved in the gallbladder bed.  The clips placed previously on cystic  duct and cystic artery were visualized again.  There was no hemorrhage from  the  artery.  There was no bile spillage from the cystic duct.  The patient  was then placed in a back to normal position, in the supine position.  The  right upper quadrant was irrigated out copiously with  irrigant until  clear fluid returned.  Three 5-mm trocars were removed under direct  visualization.  There was no hemorrhage from the port sites.  Insufflation  was released.  A Bennett trocar was then removed.    Attention was then turned towards repair of the umbilical Bennett port site.   A 0 Vicryl sutures were used in a figure-of-eight fashion to close the  fascia at the Bennett port in a vertical fashion.  These were tied down.   The two stay sutures previously placed were also tied down.  The fascia at  this site was not patent to a finger.  At this point, the umbilical site  was washed out with  irrigant.  3-0 Vicryl sutures were then used in a  simple subdermal fashion to close the subdermis at all sites.  The skin was  then run with a 4-0 Vicryl suture in a running subcuticular fashion at the  umbilical site.  Dermabond was placed over all four dressing.  The patient  was then reversed from general anesthesia, extubated in the OR, transferred  back to the Postoperative Care Unit in stable condition.  All counts were  correct at the end of the procedure including lap pads, instruments as well  as needles.

## 2022-07-28 ENCOUNTER — PATIENT MESSAGE (OUTPATIENT)
Dept: FAMILY MEDICINE | Facility: CLINIC | Age: 27
End: 2022-07-28
Payer: COMMERCIAL

## 2022-07-28 ENCOUNTER — PATIENT MESSAGE (OUTPATIENT)
Dept: SURGERY | Facility: CLINIC | Age: 27
End: 2022-07-28
Payer: COMMERCIAL

## 2022-07-28 VITALS
BODY MASS INDEX: 32.02 KG/M2 | WEIGHT: 204 LBS | TEMPERATURE: 98 F | RESPIRATION RATE: 18 BRPM | DIASTOLIC BLOOD PRESSURE: 76 MMHG | OXYGEN SATURATION: 100 % | HEART RATE: 62 BPM | HEIGHT: 67 IN | SYSTOLIC BLOOD PRESSURE: 116 MMHG

## 2022-07-28 LAB
FINAL PATHOLOGIC DIAGNOSIS: NORMAL
HPV HR 12 DNA SPEC QL NAA+PROBE: NEGATIVE
HPV16 AG SPEC QL: NEGATIVE
HPV18 DNA SPEC QL NAA+PROBE: NEGATIVE
Lab: NORMAL

## 2022-07-31 ENCOUNTER — PATIENT MESSAGE (OUTPATIENT)
Dept: FAMILY MEDICINE | Facility: CLINIC | Age: 27
End: 2022-07-31
Payer: COMMERCIAL

## 2022-08-02 ENCOUNTER — OFFICE VISIT (OUTPATIENT)
Dept: SURGERY | Facility: CLINIC | Age: 27
End: 2022-08-02
Payer: COMMERCIAL

## 2022-08-02 VITALS
HEART RATE: 93 BPM | HEIGHT: 67 IN | SYSTOLIC BLOOD PRESSURE: 126 MMHG | DIASTOLIC BLOOD PRESSURE: 86 MMHG | BODY MASS INDEX: 31.08 KG/M2 | WEIGHT: 198 LBS | OXYGEN SATURATION: 99 %

## 2022-08-02 DIAGNOSIS — R21 RASH: Primary | ICD-10-CM

## 2022-08-02 LAB
FINAL PATHOLOGIC DIAGNOSIS: NORMAL
GROSS: NORMAL
Lab: NORMAL

## 2022-08-02 PROCEDURE — 3044F HG A1C LEVEL LT 7.0%: CPT | Mod: S$GLB,,, | Performed by: SURGERY

## 2022-08-02 PROCEDURE — 3008F PR BODY MASS INDEX (BMI) DOCUMENTED: ICD-10-PCS | Mod: S$GLB,,, | Performed by: SURGERY

## 2022-08-02 PROCEDURE — 3079F DIAST BP 80-89 MM HG: CPT | Mod: S$GLB,,, | Performed by: SURGERY

## 2022-08-02 PROCEDURE — 1159F MED LIST DOCD IN RCRD: CPT | Mod: S$GLB,,, | Performed by: SURGERY

## 2022-08-02 PROCEDURE — 3008F BODY MASS INDEX DOCD: CPT | Mod: S$GLB,,, | Performed by: SURGERY

## 2022-08-02 PROCEDURE — 99024 PR POST-OP FOLLOW-UP VISIT: ICD-10-PCS | Mod: S$GLB,,, | Performed by: SURGERY

## 2022-08-02 PROCEDURE — 99024 POSTOP FOLLOW-UP VISIT: CPT | Mod: S$GLB,,, | Performed by: SURGERY

## 2022-08-02 PROCEDURE — 3074F PR MOST RECENT SYSTOLIC BLOOD PRESSURE < 130 MM HG: ICD-10-PCS | Mod: S$GLB,,, | Performed by: SURGERY

## 2022-08-02 PROCEDURE — 99999 PR PBB SHADOW E&M-EST. PATIENT-LVL III: CPT | Mod: PBBFAC,,, | Performed by: SURGERY

## 2022-08-02 PROCEDURE — 3044F PR MOST RECENT HEMOGLOBIN A1C LEVEL <7.0%: ICD-10-PCS | Mod: S$GLB,,, | Performed by: SURGERY

## 2022-08-02 PROCEDURE — 3079F PR MOST RECENT DIASTOLIC BLOOD PRESSURE 80-89 MM HG: ICD-10-PCS | Mod: S$GLB,,, | Performed by: SURGERY

## 2022-08-02 PROCEDURE — 99999 PR PBB SHADOW E&M-EST. PATIENT-LVL III: ICD-10-PCS | Mod: PBBFAC,,, | Performed by: SURGERY

## 2022-08-02 PROCEDURE — 1159F PR MEDICATION LIST DOCUMENTED IN MEDICAL RECORD: ICD-10-PCS | Mod: S$GLB,,, | Performed by: SURGERY

## 2022-08-02 PROCEDURE — 3074F SYST BP LT 130 MM HG: CPT | Mod: S$GLB,,, | Performed by: SURGERY

## 2022-08-02 RX ORDER — HYDROCORTISONE 25 MG/G
CREAM TOPICAL 2 TIMES DAILY
Qty: 28 G | Refills: 1 | Status: SHIPPED | OUTPATIENT
Start: 2022-08-02 | End: 2023-02-16

## 2022-08-02 NOTE — PROGRESS NOTES
Patient presents today with concerns of rash bilateral abdomen.  Raised.  No significant erythema.  Itching.  Likely a reaction to the prep utilized during surgery.  Mild at best today.  Other surgical sites look good.  Recommendation be hydrocortisone cream twice daily to rash.  Hop in shower and scrubbed rash good prior to application hydrocortisone cream.  Follow up in surgery clinic as previously scheduled.  Follow up pathology results at that time.  If rash is not improved, follow-up primary care for evaluation possible oral dosing of steroids for a period of time to improve.

## 2022-08-11 ENCOUNTER — OFFICE VISIT (OUTPATIENT)
Dept: SURGERY | Facility: CLINIC | Age: 27
End: 2022-08-11
Payer: COMMERCIAL

## 2022-08-11 VITALS
WEIGHT: 199 LBS | HEART RATE: 87 BPM | BODY MASS INDEX: 31.23 KG/M2 | HEIGHT: 67 IN | SYSTOLIC BLOOD PRESSURE: 124 MMHG | DIASTOLIC BLOOD PRESSURE: 87 MMHG | OXYGEN SATURATION: 98 %

## 2022-08-11 DIAGNOSIS — Z09 POSTOP CHECK: Primary | ICD-10-CM

## 2022-08-11 PROCEDURE — 3074F PR MOST RECENT SYSTOLIC BLOOD PRESSURE < 130 MM HG: ICD-10-PCS | Mod: S$GLB,,, | Performed by: SURGERY

## 2022-08-11 PROCEDURE — 3079F PR MOST RECENT DIASTOLIC BLOOD PRESSURE 80-89 MM HG: ICD-10-PCS | Mod: S$GLB,,, | Performed by: SURGERY

## 2022-08-11 PROCEDURE — 1159F PR MEDICATION LIST DOCUMENTED IN MEDICAL RECORD: ICD-10-PCS | Mod: S$GLB,,, | Performed by: SURGERY

## 2022-08-11 PROCEDURE — 3008F PR BODY MASS INDEX (BMI) DOCUMENTED: ICD-10-PCS | Mod: S$GLB,,, | Performed by: SURGERY

## 2022-08-11 PROCEDURE — 3044F PR MOST RECENT HEMOGLOBIN A1C LEVEL <7.0%: ICD-10-PCS | Mod: S$GLB,,, | Performed by: SURGERY

## 2022-08-11 PROCEDURE — 3008F BODY MASS INDEX DOCD: CPT | Mod: S$GLB,,, | Performed by: SURGERY

## 2022-08-11 PROCEDURE — 99999 PR PBB SHADOW E&M-EST. PATIENT-LVL III: CPT | Mod: PBBFAC,,, | Performed by: SURGERY

## 2022-08-11 PROCEDURE — 3079F DIAST BP 80-89 MM HG: CPT | Mod: S$GLB,,, | Performed by: SURGERY

## 2022-08-11 PROCEDURE — 3074F SYST BP LT 130 MM HG: CPT | Mod: S$GLB,,, | Performed by: SURGERY

## 2022-08-11 PROCEDURE — 99999 PR PBB SHADOW E&M-EST. PATIENT-LVL III: ICD-10-PCS | Mod: PBBFAC,,, | Performed by: SURGERY

## 2022-08-11 PROCEDURE — 99024 POSTOP FOLLOW-UP VISIT: CPT | Mod: S$GLB,,, | Performed by: SURGERY

## 2022-08-11 PROCEDURE — 99024 PR POST-OP FOLLOW-UP VISIT: ICD-10-PCS | Mod: S$GLB,,, | Performed by: SURGERY

## 2022-08-11 PROCEDURE — 3044F HG A1C LEVEL LT 7.0%: CPT | Mod: S$GLB,,, | Performed by: SURGERY

## 2022-08-11 PROCEDURE — 1159F MED LIST DOCD IN RCRD: CPT | Mod: S$GLB,,, | Performed by: SURGERY

## 2022-08-11 NOTE — PROGRESS NOTES
"General Surgery  [facility]  Follow-up    HPI/Follow-up exam:  Gustavo Perry is a 27 y.o. female presents today for follow-up examination after laparoscopic cholecystectomy.  Patient since surgeries done well.  No jaundice or scleral icterus.  No nausea vomiting.  Tolerating diet.  Having normal bowel movements.  Symptoms prior to cholecystectomy completely resolved.  Patient feels much better today.  No other new issues or complaints.    PHYSICAL EXAM:  /87 (BP Location: Right arm, Patient Position: Sitting, BP Method: Medium (Automatic))   Pulse 87   Ht 5' 7" (1.702 m)   Wt 90.3 kg (199 lb)   SpO2 98%   BMI 31.17 kg/m²   Gen: Wd Wn female in NAD  Heent: Nc/At, MMM  Cv: RRR  Lung: Non-labored breathing, clear bilaterally  Abd: Soft, non-tender, non-distended, surgical sites clean dry intact no signs or symptoms of infection.  Ext: No cyanosis clubbing or edema    Pathology:  Chronic cholecystitis.    Assessment:  Gustavo Perry is a 27 y.o. female s/p surgical cholecystectomy.    Plan/Medical Decision Making:  Doing well.  No issues.  Return to normal activities.  Follow-up surgery clinic as needed.    Followup:  As needed.    Patient instructed that best way to communicate with my office staff is for patient to get on the [facility] patient portal to expedite communication and communication issues that may occur.  Patient was given instructions on how to get on the portal.  I encouraged patient to obtain portal access as well.  Ultimately it is up to the patient to obtain access.  Patient voiced understanding.          "

## 2022-08-15 ENCOUNTER — HOSPITAL ENCOUNTER (OUTPATIENT)
Dept: RADIOLOGY | Facility: HOSPITAL | Age: 27
Discharge: HOME | End: 2022-08-15
Payer: COMMERCIAL

## 2022-08-15 VITALS — HEIGHT: 67 IN | BODY MASS INDEX: 31.24 KG/M2 | WEIGHT: 199.06 LBS

## 2022-08-15 DIAGNOSIS — Z84.89 FAMILY HISTORY OF CARCINOMA IN SITU OF BREAST: ICD-10-CM

## 2022-08-15 DIAGNOSIS — Z91.89 AT HIGH RISK FOR BREAST CANCER: ICD-10-CM

## 2022-08-15 DIAGNOSIS — N63.0 BREAST LUMP OR MASS: ICD-10-CM

## 2022-08-15 PROCEDURE — 76642 ULTRASOUND BREAST LIMITED: CPT | Mod: 26,,, | Performed by: RADIOLOGY

## 2022-08-15 PROCEDURE — 76642 US BREAST BILATERAL LIMITED: ICD-10-PCS | Mod: 26,,, | Performed by: RADIOLOGY

## 2022-08-15 PROCEDURE — 76642 ULTRASOUND BREAST LIMITED: CPT | Mod: TC,50

## 2022-08-19 ENCOUNTER — HOSPITAL ENCOUNTER (OUTPATIENT)
Dept: RADIOLOGY | Facility: HOSPITAL | Age: 27
Discharge: HOME | End: 2022-08-19
Payer: COMMERCIAL

## 2022-08-19 ENCOUNTER — PATIENT MESSAGE (OUTPATIENT)
Dept: FAMILY MEDICINE | Facility: CLINIC | Age: 27
End: 2022-08-19
Payer: COMMERCIAL

## 2022-08-19 DIAGNOSIS — R10.30 LOWER ABDOMINAL PAIN: ICD-10-CM

## 2022-08-19 DIAGNOSIS — Z12.4 ENCOUNTER FOR PAPANICOLAOU SMEAR FOR CERVICAL CANCER SCREENING: ICD-10-CM

## 2022-08-19 PROCEDURE — 76705 ECHO EXAM OF ABDOMEN: CPT | Mod: TC

## 2022-08-19 PROCEDURE — 76705 US ABDOMEN LIMITED: ICD-10-PCS | Mod: 26,,, | Performed by: RADIOLOGY

## 2022-08-19 PROCEDURE — 76705 ECHO EXAM OF ABDOMEN: CPT | Mod: 26,,, | Performed by: RADIOLOGY

## 2022-08-23 ENCOUNTER — PATIENT MESSAGE (OUTPATIENT)
Dept: FAMILY MEDICINE | Facility: CLINIC | Age: 27
End: 2022-08-23
Payer: COMMERCIAL

## 2022-08-28 ENCOUNTER — NURSE TRIAGE (OUTPATIENT)
Dept: ADMINISTRATIVE | Facility: CLINIC | Age: 27
End: 2022-08-28
Payer: COMMERCIAL

## 2022-08-28 ENCOUNTER — HOSPITAL ENCOUNTER (EMERGENCY)
Facility: HOSPITAL | Age: 27
Discharge: HOME | End: 2022-08-28
Attending: INTERNAL MEDICINE
Payer: COMMERCIAL

## 2022-08-28 VITALS
OXYGEN SATURATION: 99 % | WEIGHT: 204 LBS | BODY MASS INDEX: 32.02 KG/M2 | HEART RATE: 95 BPM | HEIGHT: 67 IN | DIASTOLIC BLOOD PRESSURE: 87 MMHG | TEMPERATURE: 99 F | SYSTOLIC BLOOD PRESSURE: 123 MMHG | RESPIRATION RATE: 18 BRPM

## 2022-08-28 DIAGNOSIS — Z91.89 AT RISK FOR SURGICAL SITE INFECTION: Primary | ICD-10-CM

## 2022-08-28 PROCEDURE — 99283 EMERGENCY DEPT VISIT LOW MDM: CPT

## 2022-08-28 NOTE — TELEPHONE ENCOUNTER
OOC incoming call - Pt c/o  bg sx last month dissolvable stitches, one stitch has not dissolved, today tried to pull out this stitch and wound opened up with puss coming out of wound, at site 1.5/10 pain, vomiting, bleeding at incision, afebrile, site sensitive to touch. Post op incision protocol followed and pt advised to go to the ER now for reassessment and re stitching/wound care/ab if needed. Encounter routed to PCP and staff high priority, unable to route encounter to surgeon Eric Huston. Pt alert and oriented and will follow up with dispo.   Reason for Disposition   Surgical incision symptoms and questions   [1] Bleeding from incision AND [2] won't stop after 10 minutes of direct pressure    Additional Information   Negative: Chest pain   Negative: Difficulty breathing   Negative: [1] Major abdominal surgical incision AND [2] wound gaping open AND [3] visible internal organs   Negative: Sounds like a life-threatening emergency to the triager    Protocols used: Post-Op Symptoms and [id number], Post-Op Incision Symptoms-A-AH

## 2022-08-29 ENCOUNTER — PATIENT MESSAGE (OUTPATIENT)
Dept: FAMILY MEDICINE | Facility: CLINIC | Age: 27
End: 2022-08-29
Payer: COMMERCIAL

## 2022-08-29 NOTE — ED TRIAGE NOTES
Pt states she had gallbladder surgery July 25, pt reports a stitch hanging out of her naval. Pt is unaware if it should have already come out. Pt states she saw some discharge today.Healing wound noted to the naval, stitch present. No discharge noted.  Pt is alert and oriented, ambulatory, respirations are even unlabored. Will continue to monitor.

## 2022-08-29 NOTE — ED PROVIDER NOTES
Encounter Date: 2022       History     Chief Complaint   Patient presents with    Post-op Problem     Pt states she had gall bladder surgery 22, pt states she has a stitch that she believes needs to come out. Pt also reports some discharge to the area.      Patient is 27-year-old female who presents to the emergency room with postop complications.  The patient has 1 stitch in the lower part of her navel status post laparoscopic cholecystectomy on 2022.  Patient states she would only states earlier in the small amount of pus came out.  There was some minor redness around the sting itch insertion site.  Patient has not called her chronic follow-up with her surgeon.  Patient denies all other complaints at this time.  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    Review of patient's allergies indicates:   Allergen Reactions    Sulfa (sulfonamide antibiotics) Hives    Hibiclens (isopropyl alcohol) Rash     Past Medical History:   Diagnosis Date    Anxiety     Bipolar 1 disorder, depressed     Depression     PTSD (post-traumatic stress disorder)      Past Surgical History:   Procedure Laterality Date     SECTION      ESOPHAGOGASTRODUODENOSCOPY N/A 2022    Procedure: EGD (ESOPHAGOGASTRODUODENOSCOPY);  Surgeon: Andrea Manning MD;  Location: [facility] ENDO;  Service: Endoscopy;  Laterality: N/A;    LAPAROSCOPIC CHOLECYSTECTOMY Bilateral 2022    Procedure: CHOLECYSTECTOMY-LAPAROSCOPIC;  Surgeon: Eric Huston MD;  Location: [facility] OR;  Service: General;  Laterality: Bilateral;    TONSILLECTOMY, ADENOIDECTOMY      TUBAL LIGATION      WISDOM TOOTH EXTRACTION       Family History   Problem Relation Age of Onset    Hypertension Mother     No Known Problems Father     Breast cancer Maternal Grandmother     Cancer Maternal Grandmother     Breast cancer Paternal Grandmother     Cancer Paternal Grandmother     Depression Brother      Social History      Tobacco Use    Smoking status: Every Day     Packs/day: 1.00     Years: 15.00     Pack years: 15.00     Types: Cigarettes    Smokeless tobacco: Never   Substance Use Topics    Alcohol use: Not Currently     Comment: socially    Drug use: Never     Review of Systems   Constitutional: Negative.    HENT: Negative.     Eyes: Negative.    Respiratory: Negative.     Cardiovascular: Negative.    Gastrointestinal: Negative.    Endocrine: Negative.    Genitourinary: Negative.    Skin:  Positive for wound (Remaining stitch, lower left mandible, slight red around stitch insertion site, no drainage at this time.).   Allergic/Immunologic: Negative for food allergies.   Psychiatric/Behavioral: Negative.       Physical Exam     Initial Vitals   BP Pulse Resp Temp SpO2   08/28/22 1953 08/28/22 1953 08/28/22 1953 08/28/22 1953 08/28/22 1955   123/87 95 18 98.6 °F (37 °C) 99 %      MAP       --                Physical Exam    Constitutional: She appears well-developed and well-nourished. No distress.   HENT:   Head: Normocephalic and atraumatic.   Mouth/Throat: Oropharynx is clear and moist.   Eyes: Pupils are equal, round, and reactive to light. No scleral icterus.   Cardiovascular:  Normal rate, regular rhythm and normal heart sounds.           No murmur heard.  Pulmonary/Chest: Breath sounds normal.   Abdominal: Abdomen is soft. Bowel sounds are normal. She exhibits no distension. There is no abdominal tenderness. There is no rebound.     Neurological: She is alert and oriented to person, place, and time.   Skin: Skin is warm and dry. Capillary refill takes 2 to 3 seconds.   Psychiatric: She has a normal mood and affect.       ED Course   Procedures  Labs Reviewed - No data to display       Imaging Results    None          Medications - No data to display  Medical Decision Making:   Initial Assessment:   Patient resting in bed.  No acute distress noted.  Mild redness noted around stitch insertion site to the left lower  umbilicus.  No drainage or bleeding.  Differential Diagnosis:   Surgical site infection  Cellulitis  ED Management:  The patient seen examined emergency room.  No acute distress noted.  Single stitch still in place from post cholecystectomy.  Slight redness around the site, unable to express any drainage on exam.  Will give the patient Bactroban to use twice a day on site.  Patient to follow-up with surgeon if she continues to have issues.                    Clinical Impression:   Final diagnoses:  [Z91.89] At risk for surgical site infection (Primary)      ED Disposition Condition    Discharge Stable          ED Prescriptions       Medication Sig Dispense Start Date End Date Auth. Provider    mupirocin calcium 2% nasl oint (BACTROBAN) 2 % Oint by Nasal route 2 (two) times daily. for 5 days 10 g 8/28/2022 9/2/2022 Guille Gaines NP          Follow-up Information       Follow up With Specialties Details Why Contact Info    Wale James MD Family Medicine In 1 week As needed [address] [place] [address]  [phone]               Guille Gaines NP  08/28/22 2052

## 2022-08-29 NOTE — DISCHARGE INSTRUCTIONS
Use Bactroban ointment twice a day for 5 days.  Follow-up with surgeon if you continue to have any redness or drainage.  Follow-up primary care and return emergency room if you develop any fever, chills, purulent drainage, or any other concerning issues.

## 2022-09-01 ENCOUNTER — PATIENT MESSAGE (OUTPATIENT)
Dept: FAMILY MEDICINE | Facility: CLINIC | Age: 27
End: 2022-09-01
Payer: COMMERCIAL

## 2022-09-02 ENCOUNTER — PATIENT MESSAGE (OUTPATIENT)
Dept: FAMILY MEDICINE | Facility: CLINIC | Age: 27
End: 2022-09-02
Payer: COMMERCIAL

## 2022-09-02 DIAGNOSIS — R10.2 PELVIC PAIN: Primary | ICD-10-CM

## 2022-09-03 ENCOUNTER — PATIENT MESSAGE (OUTPATIENT)
Dept: INTERNAL MEDICINE | Facility: CLINIC | Age: 27
End: 2022-09-03
Payer: COMMERCIAL

## 2022-09-13 NOTE — PLAN OF CARE
Patient awake and alert. VSS. IV intact and infusing. Trochar sites assessed. Clean dry and intact. Pain being controlled with medication. (SEE MAR). Discharge teaching complete. All questions answered and necessary handouts provided. Doctor spoke with patient and family.    
66

## 2022-09-23 ENCOUNTER — PATIENT MESSAGE (OUTPATIENT)
Dept: FAMILY MEDICINE | Facility: CLINIC | Age: 27
End: 2022-09-23
Payer: COMMERCIAL

## 2022-09-23 ENCOUNTER — HOSPITAL ENCOUNTER (EMERGENCY)
Facility: HOSPITAL | Age: 27
Discharge: HOME | End: 2022-09-23
Attending: FAMILY MEDICINE
Payer: COMMERCIAL

## 2022-09-23 VITALS
HEIGHT: 67 IN | RESPIRATION RATE: 18 BRPM | BODY MASS INDEX: 32.02 KG/M2 | SYSTOLIC BLOOD PRESSURE: 129 MMHG | WEIGHT: 204 LBS | DIASTOLIC BLOOD PRESSURE: 91 MMHG | TEMPERATURE: 99 F | OXYGEN SATURATION: 98 % | HEART RATE: 98 BPM

## 2022-09-23 DIAGNOSIS — K13.6 IRRITATIVE HYPERPLASIA OF ORAL MUCOSA: Primary | ICD-10-CM

## 2022-09-23 DIAGNOSIS — J30.9 ALLERGIC RHINITIS, UNSPECIFIED SEASONALITY, UNSPECIFIED TRIGGER: ICD-10-CM

## 2022-09-23 DIAGNOSIS — J02.9 SORE THROAT: ICD-10-CM

## 2022-09-23 PROCEDURE — 99284 EMERGENCY DEPT VISIT MOD MDM: CPT

## 2022-09-23 RX ORDER — DEXAMETHASONE 0.5 MG/5ML
0.5 ELIXIR ORAL EVERY 6 HOURS
Qty: 200 ML | Refills: 0 | Status: SHIPPED | OUTPATIENT
Start: 2022-09-23 | End: 2022-10-03

## 2022-09-23 RX ORDER — LEVOCETIRIZINE DIHYDROCHLORIDE 5 MG/1
5 TABLET, FILM COATED ORAL NIGHTLY
Qty: 30 TABLET | Refills: 2 | Status: SHIPPED | OUTPATIENT
Start: 2022-09-23 | End: 2023-02-15 | Stop reason: CLARIF

## 2022-09-23 RX ORDER — FLUTICASONE PROPIONATE 50 MCG
1 SPRAY, SUSPENSION (ML) NASAL DAILY
Qty: 16 G | Refills: 0 | Status: SHIPPED | OUTPATIENT
Start: 2022-09-23 | End: 2022-09-23

## 2022-09-23 NOTE — ED TRIAGE NOTES
Patient presents to ED POV with c/o sore throat. Patient is AAOx4. Skin warm, dry to touch. Respirations even, nonlabored. NAD noted. Pt  ambulatory unassisted into triage. Redness and cobblestone appearance noted to posterior pharynx.

## 2022-09-23 NOTE — ED PROVIDER NOTES
Encounter Date: 2022       History     Chief Complaint   Patient presents with    Sore Throat     Patient stated she felt sore throat after she started using vaping, trying to quit smoking.     The history is provided by the patient.   Sore Throat   This is a new problem. The current episode started several days ago. The problem has been unchanged. The pain is worse on the left side. There has been no fever. Associated symptoms include congestion. Pertinent negatives include no abdominal pain, coughing, shortness of breath, stridor or swollen glands. She has tried nothing for the symptoms. The treatment provided no relief.   Review of patient's allergies indicates:   Allergen Reactions    Sulfa (sulfonamide antibiotics) Hives    Hibiclens (isopropyl alcohol) Rash     Past Medical History:   Diagnosis Date    Anxiety     Bipolar 1 disorder, depressed     Depression     PTSD (post-traumatic stress disorder)      Past Surgical History:   Procedure Laterality Date     SECTION      ESOPHAGOGASTRODUODENOSCOPY N/A 2022    Procedure: EGD (ESOPHAGOGASTRODUODENOSCOPY);  Surgeon: Andrea Manning MD;  Location: [facility] ENDO;  Service: Endoscopy;  Laterality: N/A;    LAPAROSCOPIC CHOLECYSTECTOMY Bilateral 2022    Procedure: CHOLECYSTECTOMY-LAPAROSCOPIC;  Surgeon: Erci Huston MD;  Location: [facility] OR;  Service: General;  Laterality: Bilateral;    TONSILLECTOMY, ADENOIDECTOMY      TUBAL LIGATION      WISDOM TOOTH EXTRACTION       Family History   Problem Relation Age of Onset    Hypertension Mother     No Known Problems Father     Breast cancer Maternal Grandmother     Cancer Maternal Grandmother     Breast cancer Paternal Grandmother     Cancer Paternal Grandmother     Depression Brother      Social History     Tobacco Use    Smoking status: Every Day     Packs/day: 1.00     Years: 15.00     Pack years: 15.00     Types: Cigarettes    Smokeless tobacco: Never   Substance Use Topics    Alcohol use: Not  Currently     Comment: socially    Drug use: Never     Review of Systems   HENT:  Positive for congestion and sore throat.    Respiratory:  Negative for cough, shortness of breath and stridor.    Gastrointestinal:  Negative for abdominal pain.   All other systems reviewed and are negative.    Physical Exam     Initial Vitals [09/23/22 1212]   BP Pulse Resp Temp SpO2   (!) 129/91 98 18 98.7 °F (37.1 °C) 98 %      MAP       --         Physical Exam    Nursing note and vitals reviewed.  Constitutional: She appears well-developed and well-nourished. No distress.   HENT:   Head: Normocephalic and atraumatic.   Right Ear: Hearing, tympanic membrane, external ear and ear canal normal.   Left Ear: Hearing, tympanic membrane, external ear and ear canal normal.   Nose: Mucosal edema, rhinorrhea and sinus tenderness present.   Mouth/Throat: Uvula is midline.       Bilateral tonsils removed.    Eyes: EOM are normal. Pupils are equal, round, and reactive to light.   Neck:   Normal range of motion.  Cardiovascular:  Normal rate and regular rhythm.           No murmur heard.  Pulmonary/Chest: Breath sounds normal. No respiratory distress. She has no wheezes. She has no rhonchi. She has no rales. She exhibits no tenderness.   Abdominal: Abdomen is soft.   Musculoskeletal:         General: Normal range of motion.      Cervical back: Normal range of motion.     Neurological: She is alert and oriented to person, place, and time. She has normal strength. GCS score is 15. GCS eye subscore is 4. GCS verbal subscore is 5. GCS motor subscore is 6.   Skin: Skin is warm and dry. Capillary refill takes less than 2 seconds.   Psychiatric: She has a normal mood and affect.       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 Provider    dexAMETHasone (DECADRON) 0.5 mg/5 mL Elix Take 5 mLs (0.5 mg total) by mouth every 6 (six) hours. Swish and spit, keep it in the mouth for 5 mintues for 10 days 200 mL 9/23/2022 10/3/2022 Chuy Wasserman NP    fluticasone propionate (FLONASE) 50 mcg/actuation nasal spray 1 spray (50 mcg total) by Each Nostril route once daily. 16 g 9/23/2022 -- Chuy Wasserman NP    levocetirizine (XYZAL) 5 MG tablet Take 1 tablet (5 mg total) by mouth every evening. 30 tablet 9/23/2022 9/23/2023 Chuy Wasserman NP          Follow-up Information       Follow up With Specialties Details Why Contact Info    Wale James MD Family Medicine In 2 days  [address] [facility] [address] [phone]      [facility] Emergency Dept Emergency Medicine  If symptoms worsen [address] [facility] [address] [phone]             Chuy Wasserman NP  09/23/22 7230

## 2022-10-10 ENCOUNTER — OFFICE VISIT (OUTPATIENT)
Dept: GASTROENTEROLOGY | Facility: CLINIC | Age: 27
End: 2022-10-10
Payer: COMMERCIAL

## 2022-10-10 VITALS
DIASTOLIC BLOOD PRESSURE: 77 MMHG | HEIGHT: 67 IN | SYSTOLIC BLOOD PRESSURE: 122 MMHG | HEART RATE: 71 BPM | OXYGEN SATURATION: 98 % | BODY MASS INDEX: 33.12 KG/M2 | WEIGHT: 211 LBS

## 2022-10-10 DIAGNOSIS — Z90.49 HISTORY OF CHOLECYSTECTOMY: ICD-10-CM

## 2022-10-10 DIAGNOSIS — E61.1 IRON DEFICIENCY: ICD-10-CM

## 2022-10-10 DIAGNOSIS — K21.9 GASTROESOPHAGEAL REFLUX DISEASE, UNSPECIFIED WHETHER ESOPHAGITIS PRESENT: ICD-10-CM

## 2022-10-10 DIAGNOSIS — K25.9 GASTRIC ULCER, UNSPECIFIED CHRONICITY, UNSPECIFIED WHETHER GASTRIC ULCER HEMORRHAGE OR PERFORATION PRESENT: ICD-10-CM

## 2022-10-10 DIAGNOSIS — E65 OBESE ABDOMEN: Primary | ICD-10-CM

## 2022-10-10 PROCEDURE — 99214 PR OFFICE/OUTPT VISIT, EST, LEVL IV, 30-39 MIN: ICD-10-PCS | Mod: S$GLB,,, | Performed by: INTERNAL MEDICINE

## 2022-10-10 PROCEDURE — 99214 OFFICE O/P EST MOD 30 MIN: CPT | Mod: S$GLB,,, | Performed by: INTERNAL MEDICINE

## 2022-10-10 PROCEDURE — 99999 PR PBB SHADOW E&M-EST. PATIENT-LVL III: ICD-10-PCS | Mod: PBBFAC,,, | Performed by: INTERNAL MEDICINE

## 2022-10-10 PROCEDURE — 1159F PR MEDICATION LIST DOCUMENTED IN MEDICAL RECORD: ICD-10-PCS | Mod: S$GLB,,, | Performed by: INTERNAL MEDICINE

## 2022-10-10 PROCEDURE — 3008F BODY MASS INDEX DOCD: CPT | Mod: S$GLB,,, | Performed by: INTERNAL MEDICINE

## 2022-10-10 PROCEDURE — 3074F SYST BP LT 130 MM HG: CPT | Mod: S$GLB,,, | Performed by: INTERNAL MEDICINE

## 2022-10-10 PROCEDURE — 3044F PR MOST RECENT HEMOGLOBIN A1C LEVEL <7.0%: ICD-10-PCS | Mod: S$GLB,,, | Performed by: INTERNAL MEDICINE

## 2022-10-10 PROCEDURE — 1160F RVW MEDS BY RX/DR IN RCRD: CPT | Mod: S$GLB,,, | Performed by: INTERNAL MEDICINE

## 2022-10-10 PROCEDURE — 3074F PR MOST RECENT SYSTOLIC BLOOD PRESSURE < 130 MM HG: ICD-10-PCS | Mod: S$GLB,,, | Performed by: INTERNAL MEDICINE

## 2022-10-10 PROCEDURE — 99999 PR PBB SHADOW E&M-EST. PATIENT-LVL III: CPT | Mod: PBBFAC,,, | Performed by: INTERNAL MEDICINE

## 2022-10-10 PROCEDURE — 3008F PR BODY MASS INDEX (BMI) DOCUMENTED: ICD-10-PCS | Mod: S$GLB,,, | Performed by: INTERNAL MEDICINE

## 2022-10-10 PROCEDURE — 3044F HG A1C LEVEL LT 7.0%: CPT | Mod: S$GLB,,, | Performed by: INTERNAL MEDICINE

## 2022-10-10 PROCEDURE — 3078F PR MOST RECENT DIASTOLIC BLOOD PRESSURE < 80 MM HG: ICD-10-PCS | Mod: S$GLB,,, | Performed by: INTERNAL MEDICINE

## 2022-10-10 PROCEDURE — 3078F DIAST BP <80 MM HG: CPT | Mod: S$GLB,,, | Performed by: INTERNAL MEDICINE

## 2022-10-10 PROCEDURE — 1159F MED LIST DOCD IN RCRD: CPT | Mod: S$GLB,,, | Performed by: INTERNAL MEDICINE

## 2022-10-10 PROCEDURE — 1160F PR REVIEW ALL MEDS BY PRESCRIBER/CLIN PHARMACIST DOCUMENTED: ICD-10-PCS | Mod: S$GLB,,, | Performed by: INTERNAL MEDICINE

## 2022-10-10 PROCEDURE — 99213 OFFICE O/P EST LOW 20 MIN: CPT | Mod: PBBFAC,PN | Performed by: INTERNAL MEDICINE

## 2022-10-10 RX ORDER — PANTOPRAZOLE SODIUM 40 MG/1
40 TABLET, DELAYED RELEASE ORAL DAILY
Qty: 30 TABLET | Refills: 11 | Status: SHIPPED | OUTPATIENT
Start: 2022-10-10 | End: 2024-02-28

## 2022-10-10 NOTE — PATIENT INSTRUCTIONS
She has had a cholecystectomy.  She will continue the Protonix her reflux regimen current medications vitamins and minerals.  She will try to avoid the offending foods particularly the fried in the fatty foods.

## 2022-10-10 NOTE — PROGRESS NOTES
Subjective:       Patient ID: Gustavo Perry is a 27 y.o. female.    Chief Complaint: Heartburn (For that past couple days.) and Follow-up    She has had a cholecystectomy.  She is markedly improved.  She has history gastroesophageal reflux.  The PPI has resolved her symptoms.  She has tried to ingest a nutritious diet and avoid the offending foods especially the fried and fatty foods.  She is having daily bowel movements.  She denies dysphagia aspiration hematemesis hematochezia jaundice or bleeding.                                            Office Visit     Go to [facility]    7/18/2022    [facility]         Photo of MD Andrea Khanna MD      Gastroenterology               Gastric ulcer, unspecified chronicity, unspecified whether gastric ulcer hemorrhage or perforation present +5 more      Dx               Follow-up      Reason for Visit               Progress Notes          Andrea Manning MD at 7/18/2022  9:00 AM      Status: Signed                        Subjective:             Patient ID: Gustavo Perry is a 27 y.o. female.         Chief Complaint: Follow-up                   Results    Upper GI endoscopy (Order [id number])                   Result Information              Status: Final result (Collected: 7/5/2022 10:40)         Provider Status: Reviewed                    Reviewed By              Andrea Manning MD on 7/6/2022 07:07    Wale James MD on 7/5/2022 12:36    Andrea Manning MD on 7/5/2022 11:32              PACS Images          Show images for Upper GI endoscopy         Upper GI endoscopy    Order: [id number]    Status: Final result      Visible to patient: Yes (seen)      Next appt: 07/22/2022 at 11:30 AM in Family Medicine (Elizabet Mayes NP)      0 Result Notes                        Narrative    Performed by: CATHY Recinos     Patient Name: Gustavo Perry     Procedure Date: 7/5/2022 10:40 AM     MRN: [id number]     Account  Number: [id number]     YOB: 1995     Age: 27     Room: OR 1     Gender: Female     Attending MD: Andrea Manning MD     Procedure:             Upper GI endoscopy     Indications:           Epigastric abdominal pain, Abdominal pain in the                            right upper quadrant, Heartburn, Abnormal UGI                            series, Nausea     Providers:             Andrea Manning MD, Apple Bailey, RN, Dorothy Hidalgo, Technician     Complications:         No immediate complications.     Procedure:             After obtaining informed consent, the endoscope                            was passed under direct vision. Throughout the                            procedure, the patient's blood pressure, pulse,                            and oxygen saturations were monitored                            continuously. The Olympus scope GIF-H190 ([id number])                            was introduced through the mouth, and advanced to                            the second part of duodenum. The upper GI                            endoscopy was accomplished without difficulty. The                            patient tolerated the procedure well.     Findings:          LA Grade A (one or more mucosal breaks less than 5 mm, not extending          between tops of 2 mucosal folds) esophagitis with no bleeding was          found.          Diffuse moderately erythematous mucosa without bleeding was found in          the stomach. Biopsies were taken with a cold forceps for histology.          Estimated blood loss was minimal.          Diffuse moderately erythematous mucosa without active bleeding and          with no stigmata of bleeding was found in the duodenal bulb.          Biopsies were taken with a cold forceps for histology. Estimated          blood loss was minimal.     Impression:            - LA Grade A reflux esophagitis with no bleeding.                            -  Erythematous mucosa in the stomach. Biopsied.                            - Erythematous duodenopathy. Biopsied.     Recommendation:        - Discharge patient to home (ambulatory).                            - Resume previous diet.     MD Andrea Coleman MD     7/5/2022 11:15:57 AM     This report has been verified and signed electronically.     Dear patient,     As a result of recent federal legislation (The Federal Cures Act), you may     receive lab or pathology results from your procedure in your [facility]     account before your physician is able to contact you. Your physician or     their representative will relay the results to you with their     recommendations at their soonest availability.     Thank you,     Number of Addenda: 0     Note Initiated On: 7/5/2022 10:40 AM     Estimated Blood Loss:  Estimated blood loss: none.          This report has been verified and signed electronically.         Specimen Collected: 07/05/22 10:40 Last Resulted: 07/05/22 16:23          Order Details       View Encounter       Lab and Collection Details       Routing       Result History                     Linked Documents              View Image              Result Care Coordination                   Patient Communication               Add Comments           Seen   Back to Top                                       All Reviewers List         Andrea Manning MD on 7/6/2022 07:07                   Office Visit         6/30/2022    [facility] - General Surgery         Eric Huston MD              General Surgery         Biliary dyskinesia              Dx        Follow-up               Reason for Visit              H&P    Eric Huston MD (Physician) · · General Surgery    Expand AllCollapse All           [facility] H&P Note              Subjective:          Patient ID: Gustavo Perry is a 27 y.o. female.         Chief Complaint:  I want my gallbladder    HPI:    Gustavo Jett  [place] is a 27 y.o. female out.  History of anxiety depression presents today as an established patient for evaluation of right upper quadrant pain.  Patient since last visit has undergone HIDA scan.  HIDA scan shows evidence of low ejection fraction of gallbladder consistent with biliary dyskinesia.  Patient with reproduction of symptoms associated with CCK administration portion examination.  Patient stated that she had severe right upper quadrant pain after the test after she got home.  Nausea no vomiting.  Given the above, patient presents today for follow-up evaluation desired to proceed with gallbladder surgery.              Past Medical History:    Diagnosis        Date    ·           Anxiety                 ·           Bipolar 1 disorder, depressed      ·           Depression           ·           PTSD (post-traumatic stress disorder)                          Past Surgical History:    Procedure       Laterality         Date    ·            SECTION                            ·           TONSILLECTOMY, ADENOIDECTOMY                        ·           TUBAL LIGATION                       ·           WISDOM TOOTH EXTRACTION                                 Family History    Problem           Relation           Age of Onset    ·           Hypertension   Mother      ·           No Known Problems   Father                 Social History              Socioeconomic History    ·           Marital status:      Tobacco Use    ·           Smoking status:          Current Every Day Smoker                            Packs/day:      0.50                            Years:  9.00                            Pack years:     4.50                            Types: Cigarettes    ·           Smokeless tobacco:    Never Used    Substance and Sexual Activity    ·           Alcohol use:    Not Currently                            Comment: socially    ·           Drug use:        Never    ·           Sexual activity:             Yes                            Partners:         Male                            Birth control/protection:           None, See Surgical Hx                            Comment: Tubal Ligation -                    Current Medications    Current Outpatient Medications    Medication       Sig       Dispense         Refill    ·           iron-vitamin C 100-250 mg, ICAR-C, (ICAR-C) 100-250 mg Tab      Take 1 tablet by mouth once daily at 6am.  30 tablet          11    ·           ondansetron (ZOFRAN-ODT) 4 MG TbDL     Take 1 tablet (4 mg total) by mouth every 6 (six) hours as needed (nausea or vomiting).            30 tablet          1    ·           ibuprofen (ADVIL,MOTRIN) 200 MG tablet    Take 200 mg by mouth every 6 (six) hours as needed for Pain.                                Current Facility-Administered Medications    Medication       Dose    Route   Frequency       Provider           Last Rate         Last Admin    ·           ceFOXItin (MEFOXIN) 2 g in dextrose 5 % 50 mL IVPB        2 g      Intravenous     On Call Procedure           Eric Huston MD                                   Review of patient's allergies indicates:    Allergen           Reactions    ·           Sulfa (sulfonamide antibiotics)           Hives              Review of Systems     Constitutional: Negative for activity change, appetite change, chills and fever.     HENT: Negative for congestion, dental problem and ear discharge.      Eyes: Negative for discharge and itching.     Respiratory: Negative for apnea, choking and chest tightness.      Cardiovascular: Negative for chest pain and leg swelling.     Gastrointestinal: Positive for abdominal pain. Negative for abdominal distention, anal bleeding, constipation, diarrhea and nausea.     Endocrine: Negative for cold intolerance and heat intolerance.     Genitourinary: Negative for difficulty urinating and dyspareunia.     Musculoskeletal: Negative for arthralgias and back  "pain.     Skin: Negative for color change and pallor.     Neurological: Negative for dizziness and facial asymmetry.     Hematological: Negative for adenopathy. Does not bruise/bleed easily.     Psychiatric/Behavioral: Negative for agitation and behavioral problems.                Objective:         Vitals    Vitals:                06/30/22 1018    BP:      126/84    Pulse:  75    SpO2:  99%    Weight:            92.5 kg (204 lb)    Height: 5' 7" (1.702 m)              Physical Exam    Constitutional:         General: She is not in acute distress.       Appearance: She is well-developed.     HENT:        Head: Normocephalic and atraumatic.     Eyes:        Pupils: Pupils are equal, round, and reactive to light.     Neck:        Thyroid: No thyromegaly.     Cardiovascular:        Rate and Rhythm: Normal rate and regular rhythm.     Pulmonary:        Effort: Pulmonary effort is normal.        Breath sounds: Normal breath sounds.     Abdominal:        General: Bowel sounds are normal. There is no distension.        Palpations: Abdomen is soft.        Tenderness: There is abdominal tenderness in the right upper quadrant.     Musculoskeletal:           General: No deformity. Normal range of motion.        Cervical back: Normal range of motion and neck supple.     Skin:       General: Skin is warm.        Capillary Refill: Capillary refill takes less than 2 seconds.        Findings: No erythema.     Neurological:        Mental Status: She is alert and oriented to person, place, and time.        Cranial Nerves: No cranial nerve deficit.     Psychiatric:           Behavior: Behavior normal.                    Lab Review:     CBC:          Lab Results    Component     Value   Date                WBC    6.46     05/16/2022                RBC     4.52     05/16/2022                HGB    11.9 (L)            05/16/2022                HCT     36.0 (L)            05/16/2022                PLT      240      05/16/2022         BMP: "          Lab Results    Component     Value   Date                GLU     85        05/16/2022                NA       140      05/16/2022                K          3.7       05/16/2022                CL        108      05/16/2022                CO2     25        05/16/2022                BUN     9          05/16/2022                CREATININE  0.9       05/16/2022                CALCIUM        9.1       05/16/2022         Diagnostics Review: HIDA scan reviewed.  Evidence of low ejection fraction of gallbladder.  Reproduction of symptoms associated with CCK administration portion         Assessment:               1.         Biliary dyskinesia                Plan:    Biliary dyskinesia    -     Case Request Operating Room: CHOLECYSTECTOMY-LAPAROSCOPIC    -     Full code; Standing    -     Place in Outpatient; Standing    -     Vital signs; Standing    -     Insert peripheral IV; Standing    -     Verify beta-blocker dose taken within 24 hours if patient is prescribed beta-blocker; Standing    -     Height and weight; Standing    -     Intake and output; Standing    -     Verify discontinuation of antithrombotics; Standing    -     POCT glucose; Standing    -     Verify blood consent; Standing    -     Verify consent; Standing    -     Clip and Prep Abdomen Zyphoid to Pubis; Standing    -     Chlorhexidine (CHG) 2% Wipes; Standing    -     Hibiclens shower; Standing    -     Hibiclens shower; Standing    -     Notify Physician; Standing    -     Diet NPO; Standing    -     Place MARIA A hose; Standing    -     Place sequential compression device; Standing         Other orders    -     0.9%  NaCl infusion    -     IP VTE LOW RISK PATIENT; Standing    -     ceFOXItin (MEFOXIN) 2 g in dextrose 5 % 50 mL IVPB                   Medical Decision Making/Counseling:    Patient with evidence of biliary colic with biliary dyskinesia.  Patient offered low-fat diet versus cholecystectomy.  Risk benefits were discussed in detail the  patient clinic today.  After risk benefits discussion, patient voiced understanding risk benefits and desires proceed with surgical cholecystectomy.  All questions were answered patient satisfaction.         Risks and benefits of cholecystectomy were discussed with the patient. Benefits the patient could receive would be the resolution of gallbladder attacks causing pain, cramps, nausea vomiting etc.  Benefits also include eliminating the risk of the patient presenting through the ER with acute cholecystitis and needing an emergent operation.  Risks of cholecystectomy were also included in our discussion.  Risks include injury to the common bile duct (liver drain tube) occurring in 1 in 250 cholecystectomy is performed across the [place].  I discussed with the patient that if this were to occur she could need further surgeries to include likely a hepaticojejunostomy.  I discussed there is a possibility of transfer for surgical therapy for this, if it were to occur.  I also discussed the risk of conversion to an open procedure (cold fashion surgery, how gallbladders were taken out previously) to occur in 5% of cholecystectomies.  I discussed that in 100% cholecystectomies I started with the small incision (laparoscopic technique).  Reason for conversion to an open procedure is related to bleeding, significant scarring or inflammation, inability to obtain a critical view which could lead to injury of surrounding structures to include the stomach, duodenum, IVC, common bile duct, etc.  I discussed there is also a possibility of postoperatively needing reoperation.  Additionally, I have discussed with the patient the possibilities of biliary leak after laparoscopic cholecystectomy.  Literature would suggest a 2% leak rate.  This could necessitate a postoperative ERCP or even postoperative procedure including diagnostic laparoscopy for drainage or percutaneous drainage.  There is also the intrinsic risks of any  "surgery to include bleeding and infection.  Patient understands all the above risks and benefits and wishes to proceed in the near future with laparoscopic versus open cholecystectomy.  Counseling time 60 minutes in clinic.  I drew a picture for the patient of the foregut liver biliary anatomy and tree for further understanding while in clinic.         Patient instructed that best way to communicate with my office staff is for patient to get on the [facility] epic patient portal to expedite communication and communication issues that may occur.  Patient was given instructions on how to get on the portal.  I encouraged patient to obtain portal access as well.  Ultimately it is up to the patient to obtain access.  Patient voiced understanding.                             Other Notes    All notes              Nursing from Melisa Call LPN                   Instructions         After Visit Summary (Printed 6/30/2022)                   Additional Documentation         Vitals:  /84         Pulse 75         Ht 5' 7" (1.702 m)         Wt 92.5 kg (204 lb)         LMP 06/07/2022 (Exact Date)         SpO2 99%         BMI 31.95 kg/m²         BSA 2.09 m²         Pain Sc   3 (Loc: Abdomen)         Flowsheets:     Anthropometrics              SmartForms:    [facility] AMB - FALL RISK ·          [facility]INA HM TOPICS ADVANCED              Encounter Info:           Billing Info,         History,         Allergies,         Detailed Report              Not recorded              All Charges for This Encounter         Code    Description      Service Date   Service Provider         Modifiers         Qty    43848  IN OFFICE/OUTPT VISIT, [facility], 40-54 MIN          6/30/2022        Eric Huston MD    S$GLB 1    3079F  IN MOST RECENT DIASTOLIC BLOOD PRESSURE 80-89 MM HG        6/30/2022            Eric Huston MD   S$GLB 1    3074F  IN MOST RECENT SYSTOLIC BLOOD PRESSURE < 130 MM HG         6/30/2022            Eric MALAGON. " "MD Robel   S$GLB 1    3044F  WI MOST RECENT HEMOGLOBIN A1C LEVEL <7.0%      6/30/2022        Eric Huston MD    S$GLB 1    3008F  WI BODY MASS INDEX (BMI) DOCUMENTED       6/30/2022        Eric Huston MD         S$GLB 1    [id number]      WI PBB SHADOW E&M-EST. PATIENT-LVL IV      6/30/2022        Eric Huston MD    PBBFAC          1         Level of Service         Level of Service    WI OFFICE/OUTPT VISIT, EST, LEVL V, 40-54 MIN (25711)         BestPractice Advisories         Click to view BestPractice Advisory history         AVS Reports         Date/Time       Report Action  User    6/30/2022 10:38 AM   After Visit Summary    Printed Melisa Call LPN         Encounter-Level Documents on 06/30/2022:         After Visit Summary - Document on 6/30/2022 10:38 AM by Melisa Call LPN: After Visit Summary              Orders Placed         Case Request Operating Room: CHOLECYSTECTOMY-LAPAROSCOPIC              Medication Changes                   ceFOXItin (MEFOXIN) 2 g in dextrose 5 % 50 mL IVPB 2 g Intravenous On Call Procedure @ 100 mL/hr, Administer 30 minutes prior to incision              Medication List         Visit Diagnoses                   Biliary dyskinesia              Problem List                   Office Visit         6/13/2022    [place] - Gastroenterology         Andrea Manning MD              Gastroenterology        Iron deficiency anemia, unspecified iron deficiency anemia type +4 more              Dx        Abdominal Pain  · Other ; Referred by Wale James MD              Reason for Visit              Progress Notes    Andrea Manning MD (Physician) · · Gastroenterology              Subjective:          Patient ID: Gustavo Perry is a 27 y.o. female.         Chief Complaint: Abdominal Pain (Sharp pains "by gallbladder") and Other (Nausea before & after eating)         For at least 2 months she has experienced nausea.  She has had pyrosis.  She has " tried to decrease her oral intake because she does not want to vomit.  She does not associate her symptoms with a specific food.  She denies a precipitating factor.  The ultrasound shows a contracted gallbladder but the CT scan was normal.  Her mother recently had a cholecystectomy.  An uncle and grandfather had Pineda's disease, esophageal stomach and liver cancer.  She denies dysphagia aspiration.  She has 4 children with her last delivery year ago.  Her children are living and well.  She appears to have iron deficiency with a decreased MCV. She has menorrhagia and has not followed up with her gynecologist but is going to do so.  She denies hematemesis hematochezia jaundice or bleeding.  She has complained of nonspecific right upper quadrant epigastric discomfort which she is difficult to describe but associated with nausea.              Allergies:         Review of patient's allergies indicates:    Allergen           Reactions    ·           Sulfa (sulfonamide antibiotics)           Hives              Medications:         Current Outpatient Medications:     ·  ibuprofen (ADVIL,MOTRIN) 200 MG tablet, Take 200 mg by mouth every 6 (six) hours as needed for Pain., Disp: , Rfl:     ·  ondansetron (ZOFRAN-ODT) 4 MG TbDL, Take 1 tablet (4 mg total) by mouth every 6 (six) hours as needed (nausea or vomiting). (Patient not taking: Reported on 2022), Disp: 30 tablet, Rfl: 1              Past Medical History:    Diagnosis        Date    ·           Anxiety                 ·           Bipolar 1 disorder, depressed      ·           Depression           ·           PTSD (post-traumatic stress disorder)                               Past Surgical History:    Procedure       Laterality         Date    ·            SECTION                            ·           TONSILLECTOMY, ADENOIDECTOMY                        ·           TUBAL LIGATION                       ·           WISDOM TOOTH EXTRACTION                                       Review of Systems     Constitutional: Negative for appetite change, fever and unexpected weight change.     HENT: Negative for trouble swallowing.           No jaundice.     Respiratory: Negative for cough, shortness of breath and wheezing.           She is a daily cigarette smoker.  She has been offered the smoking cessation program in the past.  She denies alcohol consumption.  She denies dysphagia aspiration hemoptysis chronic cough chronic sputum production or dyspnea on exertion.     Cardiovascular: Negative for chest pain.          She denies exertional chest pain or rhythm disturbance.     Gastrointestinal: Positive for abdominal pain and nausea. Negative for abdominal distention, anal bleeding, blood in stool, constipation, diarrhea and rectal pain.              CT Abdomen Pelvis  Without Contrast         Status: Final result         MyChart Results Release         WP Enginet Status: Active         Results Release              PACS Images for ViTAL Eek Viewer          Show images for CT Abdomen Pelvis Without Contrast         CT Abdomen Pelvis  Without Contrast    Order: [id number]    Status: Final result           Visible to patient: Yes (seen)           Next appt: 06/23/2022 at 08:45 AM in General Surgery (Eric Huston MD)           0 Result Notes              Details              Reading Physician    Reading Date Result Priority    Deangelo Torres MD    [phone]  5/16/2022        STAT         Narrative & Impression    EXAMINATION:    CT ABDOMEN PELVIS WITHOUT CONTRAST         CLINICAL HISTORY:    Abdominal pain, acute, nonlocalized;         TECHNIQUE:    Low dose axial images, sagittal and coronal reformations were obtained from the lung bases to the pubic symphysis.  Oral contrast was not administered.         COMPARISON:    CT 06/28/2017.         FINDINGS:    The liver and spleen are normal in size and attenuation.  The gallbladder, pancreas and adrenal glands are  unremarkable.         Kidneys are normal in size and attenuation.  No renal calculi.  No changes of hydronephrosis.  No perinephric inflammatory change.         Air and stool throughout the loops of colon.  No mesenteric inflammatory change.  No CT evidence for bowel obstruction.  Appendix is normal in caliber.         The bladder is decompressed.  Uterus and ovaries are normal in size and attenuation.  No free fluid in cul-de-sac.         No significant mesenteric or retroperitoneal lymphadenopathy.  There is a small fat containing umbilical hernia.         Impression:         1. No renal calculi.    2. Small fat containing umbilical hernia.              Electronically signed by: Deangelo Torres    Date:                                            05/16/2022    Time:                                           13:56                             Exam Ended: 05/16/22 13:35           Last Resulted: 05/16/22 13:56          Order Details            View Encounter            Lab and Collection Details            Routing            Result History                          Result Care Coordination                   Patient Communication               Add Comments         Seen   Back to Top                        Other Results from 5/16/2022               Contains abnormal data Comprehensive Metabolic Panel (CMP)    Order: [id number]    Status: Final result           Visible to patient: Yes (seen)           Next appt: 06/23/2022 at 08:45 AM in General Surgery (Eric Huston MD)           0 Result Notes              Component    Ref Range & Units    4 wk ago         2 mo ago        3 mo ago        1 yr ago    Sodium           136 - 145 mmol/L       140      140      137      135 Low      Potassium      3.5 - 5.1 mmol/L         3.7       4.1       3.2 Low           3.5     Chloride         95 - 110 mmol/L         108      107      104      107     CO2     23 - 29 mmol/L           25        23        24        19 Low       Glucose          70 - 110 mg/dL           85        86        106      96     BUN    6 - 20 mg/dL   9          12        10        8     Creatinine      0.5 - 1.4 mg/dL           0.9       0.9       0.9       0.6     Calcium          8.7 - 10.5 mg/dL         9.1       9.3       9.1       9.0     Total Protein  6.0 - 8.4 g/dL  7.1       7.5       7.4       6.7     Albumin          3.5 - 5.2 g/dL  3.8       3.8       4.2       3.5     Total Bilirubin            0.1 - 1.0 mg/dL           0.2       0.2 CM            0.4 CM            0.1 CM     Comment: For infants and newborns, interpretation of results should be based     on gestational age, weight and in agreement with clinical     observations.          Premature Infant recommended reference ranges:     Up to 24 hours.............<8.0 mg/dL     Up to 48 hours............<12.0 mg/dL     3-5 days..................<15.0 mg/dL     6-29 days.................<15.0 mg/dL     Alkaline Phosphatase          55 - 135 U/L    77        73        75        59     AST     10 - 40 U/L      16        12        14        12     ALT     10 - 44 U/L      18        12        16        12     Anion Gap      8 - 16 mmol/L 7 Low              10        9          9     eGFR if African American    >60 mL/min/1.73 m^2           >60.0   >60.0   >60.0   >60.0     eGFR if non African American        >60 mL/min/1.73 m^2           >60.0   >60.0 CM        >60.0 CM      >60.0 CM     Comment: Calculation used to obtain the estimated glomerular filtration     rate (eGFR) is the CKD-EPI equation.     Resulting Agency                 [facility]           [facility]           [facility]  [facility]                             Specimen Collected: 05/16/22 13:32           Last Resulted: 05/16/22 14:21           Lab Flowsheet            Order Details            View Encounter            Lab and Collection Details            Routing            Result History                CM=Additional comments                 Result Care Coordination                   Patient Communication               Add Comments         Seen   Back to Top                              Contains abnormal data Complete Blood Count (CBC)    Order: [id number]    Status: Final result           Visible to patient: Yes (seen)           Next appt: 06/23/2022 at 08:45 AM in General Surgery (Eric Huston MD)           0 Result Notes              Component    Ref Range & Units    4 wk ago     (5/16/22)          2 mo ago     (4/1/22)            3 mo ago     (3/15/22)          1 yr ago     (12/23/20)        1 yr ago     (12/22/20)        1 yr ago     (6/25/20)    WBC    3.90 - 12.70 K/uL        6.46     5.57     7.01     8.66     8.31     7.10     RBC    4.00 - 5.40 M/uL         4.52     4.87     4.69     3.30 Low         4.07     4.20     Hemoglobin   12.0 - 16.0 g/dL          11.9 Low         12.8     12.4     9.4 Low           11.3 Low            12.5     Hematocrit     37.0 - 48.5 %   36.0 Low         40.1     37.5     29.2 Low         35.3 Low         35.8 Low      MCV    82 - 98 fL        80 Low            82        80 Low            89        87        85     MCH    27.0 - 31.0 pg 26.3 Low         26.3 Low         26.4 Low         28.5     27.8     29.8     MCHC 32.0 - 36.0 g/dL          33.1     31.9 Low         33.1     32.2     32.0     34.9     RDW    11.5 - 14.5 %   14.9 High        14.5     14.9 High        13.3     13.4     12.7     Platelets         150 - 450 K/uL            240      278      245      223 R   255 R   195 R     MPV    9.2 - 12.9 fL    10.8     10.1     10.6     11.0     11.0     10.3     Immature Granulocytes       0.0 - 0.5 %       0.3       0.2       0.3       0.3       0.2       0.3     Gran # (ANC) 1.8 - 7.7 K/uL  3.1       2.6       4.9       6.0       5.2       4.1     Immature Grans (Abs)          0.00 - 0.04 K/uL          0.02     0.01 CM          0.02 CM          0.03 CM      0.02 CM          0.02 CM     Comment:  Mild elevation in immature granulocytes is non specific and     can be seen in a variety of conditions including stress response,     acute inflammation, trauma and pregnancy. Correlation with other     laboratory and clinical findings is essential.     Lymph #         1.0 - 4.8 K/uL  3.0       2.5       1.5       2.1       2.6       2.5     Mono #           0.3 - 1.0 K/uL  0.3       0.3       0.4       0.5       0.5       0.4     Eos #   0.0 - 0.5 K/uL  0.1       0.2       0.1       0.1       0.1       0.1     Baso #            0.00 - 0.20 K/uL          0.03     0.03     0.05     0.01     0.03     0.02     nRBC  0 /100 WBC    0          0          0          0          0          0     Gran %           38.0 - 73.0 %   47.7     46.3     69.1     69.0     62.2     57.3     Lymph %        18.0 - 48.0 %   46.6     44.9     22.0     23.8     30.7     35.8     Mono %          4.0 - 15.0 %     4.0       5.2       6.3       6.2       5.8       5.5     Eosinophil % 0.0 - 8.0 %       0.9       2.9       1.6       0.6       0.7       0.8     Basophil %     0.0 - 1.9 %       0.5       0.5       0.7       0.1       0.4       0.3     Differential Method               Automated     Automated     Automated     Automated     Automated            Automated     Resulting Agency                 [facility]           [facility]           [facility]            [facility]                             Specimen Collected: 05/16/22 13:32           Last Resulted: 05/16/22 13:45           Lab Flowsheet            Order Details            View Encounter            Lab and Collection Details            Routing            Result History                CM=Additional comments  R=Reference range differs from displayed range                Result Care Coordination                   Patient Communication               Add Comments         Seen   Back to Top                              Contains abnormal data Urinalysis, Reflex to Urine Culture  Urine, Clean Catch    Order: [id number]    Status: Final result           Visible to patient: Yes (seen)           Next appt: 06/23/2022 at 08:45 AM in General Surgery (Eric Huston MD)           Specimen Information:         Urine, Clean Catch                    0 Result Notes              Component    Ref Range & Units    4 wk ago     (5/16/22)          6 mo ago     (11/30/21)        1 yr ago     (12/22/20)        2 yr ago     (4/17/20)          2 yr ago     (11/13/19)    Specimen UA             Urine, Unspecified    Urine, Catheterized   Urine, Clean Catch    Urine, Clean Catch  Urine, Clean Catch     Color, UA       Yellow, Straw, Tatiana           Yellow             Yellow             Yellow             Yellow            Yellow     Appearance, UA        Clear   Clear   Clear   Hazy Abnormal          Clear   Clear     pH, UA            5.0 - 8.0           6.0       6.0       7.0       6.0       5.0     Specific Gravity, UA 1.005 - 1.030   1.025   >=1.030 Abnormal     1.025   1.015   1.025     Protein, UA    Negative         Negative         Negative CM  Trace Abnormal  CM             Negative CM      Trace Abnormal  CM     Comment: Recommend a 24 hour urine protein or a urine     protein/creatinine ratio if globulin induced proteinuria is     clinically suspected.     Glucose, UA   Negative         Negative         Negative         Negative         Negative         Negative     Ketones, UA   Negative         Negative         Negative         Negative         Negative         Negative     Bilirubin (UA) Negative         Negative         Negative         Negative         Negative         Negative     Occult Blood UA       Negative         Trace Abnormal        Trace Abnormal        Negative            Negative         3+ Abnormal      Nitrite, UA      Negative         Negative         Negative         Negative         Negative         Negative     Urobilinogen, UA      Negative EU/dL         Negative          Negative         Negative         Negative            Negative     Leukocytes, UA         Negative         Negative         Negative         Trace Abnormal        Trace Abnormal      Negative     Resulting Agency                 [facility]           [facility]           [facility]  [facility]            [facility]                             Narrative    Performed by: IVY         Preferred Collection Type->Urine, Clean Catch     Specimen Source->Urine         Specimen Collected: 05/16/22 12:49           Last Resulted: 05/16/22 13:11           Lab Flowsheet            Order Details            View Encounter            Lab and Collection Details            Routing            Result History                CM=Additional comments                Result Care Coordination                   Patient Communication               Add Comments         Seen   Back to Top                             Pregnancy, urine rapid    Order: [id number]    Status: Final result           Visible to patient: Yes (seen)           Next appt: 06/23/2022 at 08:45 AM in General Surgery (Eric Huston MD)           0 Result Notes              Component    4 wk ago     Preg Test, Ur  Negative     Resulting Agency     [facility]                             Narrative    Performed by: IVY         Specimen Source->Urine         Specimen Collected: 05/16/22 12:49           Last Resulted: 05/16/22 13:11           Lab Flowsheet            Order Details            View Encounter            Lab and Collection Details            Routing            Result History                          Result Care Coordination                   Patient Communication               Add Comments         Seen   Back to Top                             CT Abdomen Pelvis  Without Contrast: Patient Communication          Add Comments         Seen              External Result Report         External Result Report         Narrative & Impression               EXAMINATION:    CT ABDOMEN PELVIS WITHOUT CONTRAST         CLINICAL HISTORY:    Abdominal pain, acute, nonlocalized;         TECHNIQUE:    Low dose axial images, sagittal and coronal reformations were obtained from the lung bases to the pubic symphysis.  Oral contrast was not administered.         COMPARISON:    CT 06/28/2017.         FINDINGS:    The liver and spleen are normal in size and attenuation.  The gallbladder, pancreas and adrenal glands are unremarkable.         Kidneys are normal in size and attenuation.  No renal calculi.  No changes of hydronephrosis.  No perinephric inflammatory change.         Air and stool throughout the loops of colon.  No mesenteric inflammatory change.  No CT evidence for bowel obstruction.  Appendix is normal in caliber.         The bladder is decompressed.  Uterus and ovaries are normal in size and attenuation.  No free fluid in cul-de-sac.         No significant mesenteric or retroperitoneal lymphadenopathy.  There is a small fat containing umbilical hernia.         Impression:         1. No renal calculi.    2. Small fat containing umbilical hernia.              Electronically signed by: Deangelo Torres    Date:                                            05/16/2022    Time:                                           13:56                         Encounter              View Encounter                        Signed by         Signed            Time    Phone Pager    Deangelo Torres MD         5/16/2022 13:56          [phone]           Exam Details         Performed Procedure           Technologist  Supporting Staff        Performing Physician    CT Abdomen Pelvis  Without Contrast      Albert Carranza, RT                                      Appointment Date/Status     Modality         Department        5/16/2022     Completed        [facility]                      Begin Exam   End Exam       Begin Exam Questionnaires            End Exam  Questionnaires    5/16/2022  1:35 PM    5/16/2022  1:35 PM    RIS PREGNANCY TECH NAVIGATOR        IMAGING END ALL                   Reason for Exam    Priority: STAT    Abdominal pain, acute, nonlocalized         Order Report          Order Details              US Abdomen Complete         Status: Final result                   MyChart Results Release         MyChart Status: Active         Results Release                   US Abdomen Complete: Result Notes                     Wale James MD     5/31/2022  8:25 AM CDT                     Discussed via portal on 5/31/22. Referred to surgery                        PACS Images for [facility] Viewer          Show images for US Abdomen Complete                                       All Reviewers List         Wale James MD on 5/31/2022 08:25              US Abdomen Complete    Order: [id number]    Status: Final result      Visible to patient: Yes (seen)      Next appt: 06/23/2022 at 08:45 AM in General Surgery (Eric Huston MD)      Dx: RUQ abdominal pain      1 Result Note         Details              Reading Physician    Reading Date Result Priority    Minor Millan Jr., MD    [phone]  5/26/2022        Routine         Narrative & Impression    EXAMINATION:    US ABDOMEN COMPLETE         CLINICAL HISTORY:    Right upper quadrant pain         TECHNIQUE:    Complete abdominal ultrasound (including pancreas, aorta, liver, gallbladder, common bile duct, IVC, kidneys, and spleen) was performed.         COMPARISON:    None         FINDINGS:    Pancreas: The visualized portions of pancreas appear normal.         Aorta: No aneurysm.         Liver: 15.5 cm, normal in size. Homogeneous parenchymal echotexture. No focal lesions.         Gallbladder: Significantly contracted.  Shadowing stone is not identified but the imaging is suboptimal.  Negative sonographic Xie's sign.         Biliary system: 2.3 mm common bile duct.  No intrahepatic  ductal dilatation.         Inferior vena cava: Normal in appearance.         Right kidney: 11.7 cm. No hydronephrosis.         Left kidney: 11.9 cm. Nine         Spleen: 9.7 cm.  Normal in size with homogeneous echotexture.         Miscellaneous: No ascites.         Impression:         Suboptimal imaging of the gallbladder due to significant contraction.  Otherwise negative abdomen ultrasound              Electronically signed by: Minor Millan MD    Date:                                            05/26/2022    Time:                                           11:13                        Exam Ended: 05/26/22 09:48           Last Resulted: 05/26/22 11:13    Ryan as an Unsuccessful Attempt         Order Details      View Encounter      Lab and Collection Details      Routing      Result History                     Result Care Coordination              Result Notes                          Wale James MD     5/31/2022  8:25 AM CDT       Back to Top              Discussed via portal on 5/31/22. Referred to surgery         Patient Communication              Add Comments         Seen    Back to Top                                       US Abdomen Complete: Patient Communication         Add Comments         Seen              External Result Report         External Result Report              Narrative & Impression              EXAMINATION:    US ABDOMEN COMPLETE         CLINICAL HISTORY:    Right upper quadrant pain         TECHNIQUE:    Complete abdominal ultrasound (including pancreas, aorta, liver, gallbladder, common bile duct, IVC, kidneys, and spleen) was performed.         COMPARISON:    None         FINDINGS:    Pancreas: The visualized portions of pancreas appear normal.         Aorta: No aneurysm.         Liver: 15.5 cm, normal in size. Homogeneous parenchymal echotexture. No focal lesions.         Gallbladder: Significantly contracted.  Shadowing stone is not identified but the imaging is suboptimal.   Negative sonographic Xie's sign.         Biliary system: 2.3 mm common bile duct.  No intrahepatic ductal dilatation.         Inferior vena cava: Normal in appearance.         Right kidney: 11.7 cm. No hydronephrosis.         Left kidney: 11.9 cm. Nine         Spleen: 9.7 cm.  Normal in size with homogeneous echotexture.         Miscellaneous: No ascites.         Impression:         Suboptimal imaging of the gallbladder due to significant contraction.  Otherwise negative abdomen ultrasound              Electronically signed by: Minor Millan MD    Date:                                            05/26/2022    Time:                                           11:13                    Encounter              View Encounter                                  Signed by         Signed            Time    Phone Pager    Minor Millan Jr., MD 5/26/2022 11:13          [phone]           Reviewed By         Wale James MD on 5/31/2022 08:25                        Exam Details         Performed Procedure           Technologist  Supporting Staff        Performing Physician    US Abdomen Complete        Maria Elena Rebollar RDMS                             Appointment Date/Status     Modality         Department        5/26/2022     Completed        [facility] US 2    [facility] ULTRASOUND                         Begin Exam   End Exam       Begin Exam Questionnaires            End Exam Questionnaires    5/26/2022  9:11 AM    5/26/2022  9:48 AM    RIS PREGNANCY TECH NAVIGATOR        IMAGING END ALL                        Reason for Exam    Priority: Routine    Dx: RUQ abdominal pain (R10.11 (ICD-10-CM))                   Order Report         Order Details                             Musculoskeletal: Negative for back pain and neck pain.     Skin: Negative for pallor and rash.     Neurological: Negative for dizziness, seizures, syncope, speech difficulty, weakness and numbness.     Hematological: Negative for adenopathy.      Psychiatric/Behavioral: Negative for confusion.                Objective:    Physical Exam    Vitals reviewed.     Constitutional:         Appearance: She is well-developed.        Comments: Well-nourished well-hydrated overweight afebrile nonicteric white female.  She is sitting comfortably in the chair in breathing normally.  She appears to be oriented x3 and can relate her history and answer questions appropriately.  She is normocephalic.  Pupils are normal.     HENT:        Head: Normocephalic.     Eyes:        Pupils: Pupils are equal, round, and reactive to light.     Neck:        Thyroid: No thyromegaly.        Trachea: No tracheal deviation.     Cardiovascular:        Rate and Rhythm: Normal rate and regular rhythm.        Heart sounds: Normal heart sounds.     Pulmonary:        Effort: Pulmonary effort is normal.        Breath sounds: Normal breath sounds.     Abdominal:        General: Bowel sounds are normal. There is no distension.        Palpations: Abdomen is soft. There is no mass.        Tenderness: There is abdominal tenderness. There is no right CVA tenderness, left CVA tenderness, guarding or rebound.        Hernia: No hernia is present.        Comments: The abdomen is soft is mildly obese with mild tenderness to palpation in the epigastrium right upper quadrant.  Organomegaly or masses are not detected.  Bowel sounds are normal.     Musculoskeletal:           General: Normal range of motion.        Cervical back: Normal range of motion and neck supple.        Comments: She can ambulate normally.  She can go from the sitting to the standing position without difficulty.  She can get on the exam table without difficulty or assistance.     Lymphadenopathy:        Cervical: No cervical adenopathy.     Skin:       General: Skin is warm and dry.     Neurological:        Mental Status: She is alert and oriented to person, place, and time.        Cranial Nerves: No cranial nerve deficit.  "    Psychiatric:           Behavior: Behavior normal.                          Plan:          Iron deficiency anemia, unspecified iron deficiency anemia type    -     Iron and TIBC; Future; Expected date: 06/13/2022         Nausea    -     Ambulatory referral/consult to Gastroenterology         Upper abdominal pain, unspecified    -     NM Hepatobiliary Scan W Pharm Intervention; Future; Expected date: 06/13/2022         Gastroesophageal reflux disease, unspecified whether esophagitis present         Obese abdomen         She will continue her reflux regimen current medications vitamins and minerals.  Evaluation of her anemia is in progress.  A biliary scan and is scheduled.  She will be scheduled for upper endoscopy.  She has good health knowledge and she understands the procedure.  Specifically she realizes there is an extremely small incidence of bleeding perforation or aspiration.  She follows up with her gynecologist for evaluation of the menorrhagia.                                   Other Notes         All notes                   Instructions                   Follow up in about 1 month (around 7/13/2022).         She will continue her nutritious diet.  Upper endoscopy, biliary scan and evaluation by her gynecologist for menorrhagia is in progress.  She has iron deficiency.                        After Visit Summary (Automatic SnapShot taken 6/13/2022)              Additional Documentation         Vitals:  /80 (BP Location: Left arm, Patient Position: Sitting, BP Method: Medium (Automatic))         Pulse 83         Ht 5' 7" (1.702 m)         Wt 90.9 kg (200 lb 8 oz)         LMP 06/07/2022 (Exact Date)         SpO2 98%         BMI 31.40 kg/m²         BSA 2.07 m²         Pain Sc 0-No pain         Flowsheets:     Anthropometrics              SmartForms:    COURTESY CALL DOCUMENTATION ·          [facility] AMB - FALL RISK              Encounter Info:           Billing Info,         History,         Allergies,     "     Detailed Report                   Communications                   AMB Visit Summary: Provider Version sent to Wale James MD              Not recorded              All Charges for This Encounter         Code    Description      Service Date   Service Provider         Modifiers         Qty    47673  IN OFFICE/OUTPT VISIT, [place] IV, 45-59 MIN        6/13/2022        Andrea Manning MD           S$GLB 1    [id number]      IN PBB SHADOW E&M-EST. PATIENT-LVL IV      6/13/2022        Andrea Manning MD           PBBFAC          1    3079F  IN MOST RECENT DIASTOLIC BLOOD PRESSURE 80-89 MM HG        6/13/2022            Andrea Manning MD  S$GLB 1    3074F  IN MOST RECENT SYSTOLIC BLOOD PRESSURE < 130 MM HG         6/13/2022            Andrea Manning MD  S$GLB 1    3044F  IN MOST RECENT HEMOGLOBIN A1C LEVEL <7.0%      6/13/2022        Andrea Manning MD           S$GLB 1    3008F  IN BODY MASS INDEX (BMI) DOCUMENTED       6/13/2022        Andrea Manning MD    S$GLB 1    1159F  IN MEDICATION LIST DOCUMENTED IN MEDICAL RECORD    6/13/2022        Andrea Manning MD           S$GLB 1    1160F  IN REVIEW ALL MEDS BY PRESCRIBER/CLIN PHARMACIST DOCUMENTED         6/13/2022        Andrea Manning MD  S$GLB 1         Level of Service         Level of Service    IN OFFICE/OUTPT VISIT, CHASITY CAMARG[place] IV, 45-59 MIN ([id number])         BestPractice Advisories         Click to view BestPractice Advisory history         AVS Reports         Date/Time       Report Action  User    6/13/2022 11:51 AM   After Visit Summary    Automatically Generated        Andrea Manning MD         Encounter-Level Documents on 06/13/2022:         After Visit Summary - Document on 6/13/2022 11:51 AM by Andrea Manning MD: After Visit Summary              Orders Placed                   Iron and TIBC                   NM Hepatobiliary Scan W Pharm Intervention                   Other Orders Performed         Ambulatory referral/consult to Gastroenterology  Closed              Medication Changes                   None              Medication List         Visit Diagnoses                   Iron deficiency anemia, unspecified iron deficiency anemia type                   Nausea                   Upper abdominal pain, unspecified                   Gastroesophageal reflux disease, unspecified whether esophagitis present                   Obese abdomen              Problem List                   She complains of pyrosis and dyspepsia.  Upper endoscopy revealed gastroesophageal reflux the biopsies were negative for H pylori.  She has chronic cholecystitis and is scheduled for cholecystectomy by her surgeon.  She tried to make a food diary and avoid the offending foods particularly the fried in the fatty foods.  Her family history is positive for cholecystitis gallstones Pineda's esophagus is and gastroesophageal reflux.  She denies dysphagia hematemesis hematochezia jaundice or bleeding.  She states the Naprosyn has cause some indigestion.  She has had occasional nausea without vomiting but cannot pinpoint a precipitating factor.  She generally has daily bowel movements.              Allergies:           Review of patient's allergies indicates:       Allergen     Reactions       ·     Sulfa (sulfonamide antibiotics)     Hives                 Medications:         Current Outpatient Medications:     ·  iron-vitamin C 100-250 mg, ICAR-C, (ICAR-C) 100-250 mg Tab, Take 1 tablet by mouth once daily at 6am., Disp: 30 tablet, Rfl: 11    ·  methocarbamoL (ROBAXIN) 750 MG Tab, Take 1 tablet (750 mg total) by mouth 3 (three) times daily. for 7 days, Disp: 21 tablet, Rfl: 0    ·  naproxen (NAPROSYN) 500 MG tablet, Take 1 tablet (500 mg total) by mouth 2 (two) times daily. for 15 days, Disp: 30 tablet, Rfl: 0    ·  ondansetron (ZOFRAN-ODT) 4 MG TbDL, Take 1 tablet (4 mg total) by mouth every 6 (six) hours as needed (nausea or vomiting)., Disp: 30 tablet, Rfl: 1    ·  pantoprazole  (PROTONIX) 40 MG tablet, Take 1 tablet (40 mg total) by mouth once daily., Disp: 30 tablet, Rfl: 11         Current Facility-Administered Medications:     ·  ceFOXItin (MEFOXIN) 2 g in dextrose 5 % 50 mL IVPB, 2 g, Intravenous, On Call Procedure, Eric Huston MD                Past Medical History:       Diagnosis     Date       ·     Anxiety             ·     Bipolar 1 disorder, depressed             ·     Depression             ·     PTSD (post-traumatic stress disorder)                               Past Surgical History:       Procedure     Laterality     Date       ·      SECTION                   ·     ESOPHAGOGASTRODUODENOSCOPY     N/A     2022             Procedure: EGD (ESOPHAGOGASTRODUODENOSCOPY);  Surgeon: Andrea Manning MD;  Location: [facility];  Service: Endoscopy;  Laterality: N/A;       ·     TONSILLECTOMY, ADENOIDECTOMY                   ·     TUBAL LIGATION                   ·     WISDOM TOOTH EXTRACTION                                  Review of Systems     Constitutional: Negative for appetite change, fever and unexpected weight change.     HENT: Negative for trouble swallowing.           No jaundice.     Respiratory: Negative for cough, shortness of breath and wheezing.           She is a daily cigarette smoker.  She has been offered the smoking cessation program in the past.  She will consider this option.  She denies alcohol usage.  She denies dysphagia aspiration hemoptysis chronic cough chronic sputum production or dyspnea on exertion.     Cardiovascular: Negative for chest pain.          She denies cardiopulmonary symptoms such as exertional chest pain or rhythm disturbance.     Gastrointestinal: Positive for abdominal pain and nausea. Negative for abdominal distention, anal bleeding, blood in stool, constipation, diarrhea and rectal pain.                   Results    Specimen to Pathology, Surgery General Surgery (Order [id number])         Result Information          Status Priority           Source    Edited Result - FINAL (7/11/2022 1112)      Routine                    Pathology Documentation Hyperlink          Show images for Specimen to Pathology, Surgery General Surgery       Reviewed By              Andrea Manning MD on 7/11/2022 11:48    Andrea Manning MD on 7/8/2022 10:52              Authorizing Provider Information         Name: Andrea Manning MD Fax:     [phone]    Phone:            [phone]  Pager:          All Reviewers List         Andrea Manning MD on 7/11/2022 11:48         Specimen to Pathology, Surgery General Surgery    Order: [id number]    Status: Edited Result - FINAL           Visible to patient: Yes (seen)           Next appt: 07/22/2022 at 11:30 AM in Family Medicine (Elizabet Mayes NP)           0 Result Notes              Component    13 d ago     Final Pathologic Diagnosis      1. Duodenum biopsy:     Duodenal mucosa with intramucosal Brunner glands     No features of sprue     Negative for gastric metaplasia, dysplasia, or malignancy     2. Random gastric biopsy:     Reactive gastropathy with chronic gastritis     No Helicobacter pylori like organisms on routine staining     Immunohistochemical stain for Helicobacter pylori is pending and results will     be reported in a supplemental     Negative for intestinal metaplasia, dysplasia, or malignancy    VC         Comment: Interp By Renu Alford M.D., Signed on 07/11/2022 at 10:48    Supplemental Diagnosis          Appropriately controlled immunohistochemical stain for Helicobacter pylori     performed on Part 2 is negative      VC         Gross      Specimen type: Gastrointestinal Biopsy     Labeled with the patient's name  Gustavo Perry  , medical record number     [id number], and received in 2 separate containers.     Part 1:  Received in formalin, designated duodenum Bx are multiple portions     of pink-tan soft tissue fragments measuring 0.7 cm x 0.6 cm x 0.4 cm  in      aggregate.  The specimen is filtered, stained with Hematoxylin, and is     submitted entirely in cassette AVW--1-A.     Part 2:  Received in formalin, designated random gastric Bx are multiple     portions of pink-tan soft tissue fragments measuring 0.6 cm x 0.6 cm x 0.4 cm      in aggregate.  The specimen is filtered, stained with Hematoxylin, and is     submitted entirely in cassette TCA--2-JENNIFER.     NILO Marcos,     Grossing Technologist.       VC         Disclaimer          Unless the case is a 'gross only' or additional testing only, the final     diagnosis for each specimen is based on a microscopic examination of     appropriate tissue sections.            VC         [facility]                             Narrative    Performed by: [employer]         Pre-op Diagnosis: Gastric ulcer, unspecified chronicity,     unspecified whether gastric ulcer hemorrhage or perforation     present (K25.9)     Procedure(s):     EGD (ESOPHAGOGASTRODUODENOSCOPY)     Number of specimens: 2     Name of specimens: 1) Duodenum Bx 2) Random Gastric Bx     Which provider would you like to cc?->CECI MANNING     Release to patient->Immediate     Specimen total (fresh, frozen, permanent):->2         Specimen Collected: 07/05/22 11:03           Last Resulted: 07/11/22 11:12           Lab Flowsheet            Order Details            View Encounter            Lab and Collection Details            Routing            Result History                VC=Value has a corrected status                 Result Care Coordination                   Patient Communication               Add Comments         Seen   Back to Top                        Order Provider Info                                 Office phone  Pager  E-mail    Ordering User            Apple Bailey RN         --          --          [email or web address]    Authorizing Provider            Ceci Manning [phone]  --          --    Attending  Provider When Ordered Andrea Manning [phone]  --          --                   Genitourinary:          She is undergoing an gyn evaluation.  She has had right lower quadrant discomfort.  Her grandmother apparently had uterine cancer.     Musculoskeletal: Positive for neck pain. Negative for back pain.          She has brain to her neck.  She is on the muscle relaxer in the naproxen.     Skin: Negative for pallor and rash.     Neurological: Negative for dizziness, seizures, syncope, speech difficulty, weakness and numbness.     Hematological: Negative for adenopathy.     Psychiatric/Behavioral: Negative for confusion.             Objective:           Physical Exam    Vitals reviewed.   Constitutional:         Appearance: She is well-developed.      Comments: Well-nourished well-hydrated afebrile nonicteric white female.  She is sitting comfortably in the chair.  She is breathing normally.  She appears to be oriented x3 and can relate her history and answer questions appropriately.   HENT:        Head: Normocephalic.   Eyes:        Pupils: Pupils are equal, round, and reactive to light.   Neck:        Thyroid: No thyromegaly.      Trachea: No tracheal deviation.   Cardiovascular:        Rate and Rhythm: Normal rate and regular rhythm.      Heart sounds: Normal heart sounds.   Pulmonary:        Effort: Pulmonary effort is normal.      Breath sounds: Normal breath sounds.   Abdominal:      General: Bowel sounds are normal. There is no distension.      Palpations: Abdomen is soft. There is no mass.      Tenderness: There is abdominal tenderness. There is no right CVA tenderness, left CVA tenderness, guarding or rebound.      Hernia: No hernia is present.      Comments: The abdomen is soft with mild tenderness to palpation in the epigastrium and right upper quadrant.  Xie sign is absent.  Bowel sounds are normal.     Musculoskeletal:           General: Normal range of motion.      Cervical back: Normal range of  motion and neck supple.      Comments: She can ambulate normally.  She can go from the sitting the standing position without difficulty.  She can get on the exam table without difficulty or assistance.     Lymphadenopathy:        Cervical: No cervical adenopathy.   Skin:       General: Skin is warm and dry.   Neurological:        Mental Status: She is alert and oriented to person, place, and time.      Cranial Nerves: No cranial nerve deficit.   Psychiatric:         Behavior: Behavior normal.                       Plan:             Gastric ulcer, unspecified chronicity, unspecified whether gastric ulcer hemorrhage or perforation present    -     pantoprazole (PROTONIX) 40 MG tablet; Take 1 tablet (40 mg total) by mouth once daily.  Dispense: 30 tablet; Refill: 11         Obese abdomen         Upper abdominal pain, unspecified         Nausea         Gastroesophageal reflux disease, unspecified whether esophagitis present         Chronic cholecystitis         She will continue her reflux regimen.  She started on the Protonix.  She follows up with her other physicians.  She will make a food diary and avoid the offending foods such as the fried and fatty foods.  She will add more fiber to the diet and add more vegetables to the diet and try to decrease her caloric intake to reduce her weight.  She continues her other medications vitamins and minerals.                     Electronically signed by Andrea Manning MD at 7/18/2022  9:06 AM                   Instructions                Follow up in about 3 months (around 10/18/2022).                  She will continue her reflux regimen.  She was found to have gastroesophageal reflux and the biopsies were negative for H pylori.  She started on the Protonix.  She follows up with her gynecologist and surgeon.  She will make a food diary and avoid the offending foods particularly the fried in the fatty foods.                                After Visit Summary (Automatic SnapShot  "taken 7/18/2022)                      Additional Documentation      Vitals:          /86 (BP Location: Left arm, Patient Position: Sitting, BP Method: Medium (Automatic))             Pulse 73             Resp 16             Ht 5' 7" (1.702 m)             LMP 07/06/2022 (Exact Date)             BMI 32.11 kg/m²             BSA 2.1 m²             Pain Sc 0-No pain       SmartForms:            [facility] AMB - FALL RISK         Encounter Info:           Billing Info,             History,             Allergies,             Detailed Report                   Communications      View Encounter Conversation Summary                                        Not recorded                                All Charges for This Encounter        Code    Description    Service Date    Service Provider    Modifiers    Qty      64831 VT OFFICE/OUTPT VISIT, EST, LEVL IV, 30-39 MIN 7/18/2022 Andrea Manning MD S$GLB 1   [id number] VT PBB SHADOW E&M-EST. PATIENT-LVL III 7/18/2022 Andrea Manning MD PBBFAC 1   3079F VT MOST RECENT DIASTOLIC BLOOD PRESSURE 80-89 MM HG 7/18/2022 Andrea Manning MD S$GLB 1   3074F VT MOST RECENT SYSTOLIC BLOOD PRESSURE < 130 MM HG 7/18/2022 Andrea Manning MD S$GLB 1   3044F VT MOST RECENT HEMOGLOBIN A1C LEVEL <7.0% 7/18/2022 Andrea Manning MD S$GLB 1   3008F VT BODY MASS INDEX (BMI) DOCUMENTED 7/18/2022 Andrea Manning MD S$GLB 1   1159F VT MEDICATION LIST DOCUMENTED IN MEDICAL RECORD 7/18/2022 Andrea Manning MD S$GLB 1   1160F VT REVIEW ALL MEDS BY PRESCRIBER/CLIN PHARMACIST DOCUMENTED 7/18/2022 Andrea Manning MD S$GLB 1             Level of Service        Level of Service      VT OFFICE/OUTPT VISIT, EST, LEVL IV, 30-39 MIN [53683]                 BestPractice Advisories      Click to view BestPractice Advisory history             AVS Reports        Date/Time    Report    Action    User      7/18/2022  9:07 AM After Visit Summary Automatically Generated Andrea Manning MD             Encounter-Level Documents on " 07/18/2022:      After Visit Summary - Document on 7/18/2022 9:07 AM by Andrea Manning MD: After Visit Summary                                          Orders Placed        None              Medication Changes            New    pantoprazole sodium 40 mg Oral Daily           Medication List                   Visit Diagnoses            Primary Diagnosis    Gastric ulcer, unspecified chronicity, unspecified whether gastric ulcer hemorrhage or perforation present              Obese abdomen              Upper abdominal pain, unspecified              Nausea              Gastroesophageal reflux disease, unspecified whether esophagitis present              Chronic cholecystitis           Office visit 10/10/2022.  She has had a cholecystectomy.  She has a history gastroesophageal reflux and has been on the Protonix.                            Allergies:  Review of patient's allergies indicates:   Allergen Reactions    Sulfa (sulfonamide antibiotics) Hives    Betadine surgical scrub [povidone-iodine] Rash    Hibiclens (isopropyl alcohol) Rash       Medications:    Current Outpatient Medications:     iron-vitamin C 100-250 mg, ICAR-C, (ICAR-C) 100-250 mg Tab, Take 1 tablet by mouth once daily at 6am., Disp: 30 tablet, Rfl: 11    fluticasone propionate (FLONASE) 50 mcg/actuation nasal spray, SHAKE LIQUID AND USE 1 SPRAY(50 MCG) IN EACH NOSTRIL EVERY DAY (Patient not taking: Reported on 10/10/2022), Disp: 48 g, Rfl: 2    hydrocortisone 2.5 % cream, Apply topically 2 (two) times daily. for 10 days, Disp: 28 g, Rfl: 1    levocetirizine (XYZAL) 5 MG tablet, Take 1 tablet (5 mg total) by mouth every evening. (Patient not taking: Reported on 10/10/2022), Disp: 30 tablet, Rfl: 2    pantoprazole (PROTONIX) 40 MG tablet, Take 1 tablet (40 mg total) by mouth once daily., Disp: 30 tablet, Rfl: 11    Past Medical History:   Diagnosis Date    Anxiety     Bipolar 1 disorder, depressed     Depression     PTSD (post-traumatic stress disorder)         Past Surgical History:   Procedure Laterality Date     SECTION      ESOPHAGOGASTRODUODENOSCOPY N/A 2022    Procedure: EGD (ESOPHAGOGASTRODUODENOSCOPY);  Surgeon: Andrea Manning MD;  Location: [facility] ENDO;  Service: Endoscopy;  Laterality: N/A;    LAPAROSCOPIC CHOLECYSTECTOMY Bilateral 2022    Procedure: CHOLECYSTECTOMY-LAPAROSCOPIC;  Surgeon: Eric Huston MD;  Location: [facility] OR;  Service: General;  Laterality: Bilateral;    TONSILLECTOMY, ADENOIDECTOMY      TUBAL LIGATION      WISDOM TOOTH EXTRACTION           Review of Systems   Constitutional:  Negative for appetite change, fever and unexpected weight change.   HENT:  Negative for trouble swallowing.         No jaundice.   Respiratory:  Negative for cough, shortness of breath and wheezing.         She is a daily cigarette smoker.  She has been offered the smoking cessation program in the past will consider this option.  She denies dysphagia aspiration hemoptysis chronic cough chronic sputum production or dyspnea on exertion.  She denies significant alcohol usage.   Cardiovascular:  Negative for chest pain.        She denies exertional chest pain or rhythm disturbance.   Gastrointestinal:  Negative for abdominal distention, abdominal pain, anal bleeding, blood in stool, constipation, diarrhea, nausea, rectal pain and vomiting.        She states the PPI has resolved the pyrosis and dyspepsia.  She is on a reflux regimen.  She takes her medications vitamins and minerals.   Musculoskeletal:  Negative for back pain and neck pain.   Skin:  Negative for pallor and rash.   Neurological:  Negative for dizziness, seizures, syncope, speech difficulty, weakness and numbness.   Hematological:  Negative for adenopathy.   Psychiatric/Behavioral:  Negative for confusion.      Objective:      Physical Exam  Vitals reviewed.   Constitutional:       Appearance: She is well-developed.      Comments: Well-nourished well-hydrated overweight afebrile  nonicteric female.  She is normocephalic.  Pupils are normal.  She is sitting comfortably in the chair and breathing normally.  She is not coughing.  She is oriented x3 and can relate her history and answer questions appropriately.   HENT:      Head: Normocephalic.   Eyes:      Pupils: Pupils are equal, round, and reactive to light.   Neck:      Thyroid: No thyromegaly.      Trachea: No tracheal deviation.   Cardiovascular:      Rate and Rhythm: Normal rate and regular rhythm.      Heart sounds: Normal heart sounds.   Pulmonary:      Effort: Pulmonary effort is normal.      Breath sounds: Normal breath sounds.   Abdominal:      General: There is no distension.      Palpations: There is no mass.      Tenderness: There is no abdominal tenderness. There is no guarding or rebound.      Hernia: No hernia is present.   Musculoskeletal:         General: Normal range of motion.      Cervical back: Normal range of motion and neck supple.      Comments: She can ambulate normally.  She can go from the sitting the standing position without difficulty.   Lymphadenopathy:      Cervical: No cervical adenopathy.   Skin:     General: Skin is warm and dry.   Neurological:      Mental Status: She is alert and oriented to person, place, and time.      Cranial Nerves: No cranial nerve deficit.   Psychiatric:         Behavior: Behavior normal.         Plan:       Obese abdomen    Gastroesophageal reflux disease, unspecified whether esophagitis present    History of cholecystectomy        She will continue her reflux regimen and the PPI.  I have encouraged weight reduction by decreasing her caloric intake.  She will add more fiber and vegetables to the diet.  She is encouraged to enroll in the smoking cessation program.  She follows up with her other physicians.  She continues her other medications vitamins and minerals.

## 2022-10-12 PROBLEM — K25.9 GASTRIC ULCER: Status: RESOLVED | Noted: 2022-07-18 | Resolved: 2022-10-12

## 2022-10-12 PROBLEM — R11.0 NAUSEA: Status: RESOLVED | Noted: 2022-06-13 | Resolved: 2022-10-12

## 2022-10-12 PROBLEM — Z90.49 HISTORY OF CHOLECYSTECTOMY: Status: ACTIVE | Noted: 2022-10-12

## 2022-10-12 PROBLEM — R10.10 UPPER ABDOMINAL PAIN, UNSPECIFIED: Status: RESOLVED | Noted: 2022-06-13 | Resolved: 2022-10-12

## 2022-10-12 PROBLEM — K81.1 CHRONIC CHOLECYSTITIS: Status: RESOLVED | Noted: 2022-07-18 | Resolved: 2022-10-12

## 2022-10-13 ENCOUNTER — TELEPHONE (OUTPATIENT)
Dept: FAMILY MEDICINE | Facility: CLINIC | Age: 27
End: 2022-10-13
Payer: COMMERCIAL

## 2022-10-13 DIAGNOSIS — U07.1 COVID-19: Primary | ICD-10-CM

## 2022-10-13 NOTE — TELEPHONE ENCOUNTER
----- Message from Jackelyn Tuttle sent at 10/13/2022  8:57 AM CDT -----  Contact: [phone]  Type: Needs Medical Advice  Who Called: Pt   Symptoms (please be specific):  Covid Positive   How long has patient had these symptoms:  24hrs   Pharmacy name and phone #:    [id number] - [place], [place] - [address] AT [facility] [address]  [address]  [place] [address]  Phone: [phone] Fax: [phone]      Best Call Back Number: [phone]    Additional Information: Pt is calling to ask for medication but she does not know what kind of medication she needs.

## 2022-10-27 ENCOUNTER — PATIENT MESSAGE (OUTPATIENT)
Dept: FAMILY MEDICINE | Facility: CLINIC | Age: 27
End: 2022-10-27
Payer: MEDICAID

## 2022-11-01 NOTE — LACTATION NOTE
Assessment/Plan:    Penile discharge  Will treat empirically for STI with Suprax and Doxy, urine test done will call with results  Oriented safe sex with condom use  Dysuria  Associated with whitish penile discharge, will check urine dip but likely STI, will treat empirically  Diagnoses and all orders for this visit:    Dysuria  -     Chlamydia/GC amplified DNA by PCR; Future  -     doxycycline hyclate (VIBRAMYCIN) 100 mg capsule; Take 1 capsule (100 mg total) by mouth every 12 (twelve) hours for 7 days  -     cefixime (SUPRAX) 400 mg; Take 2 capsules (800 mg total) by mouth 1 (one) time for 1 dose  -     POCT urine dip    Penile discharge          Subjective:      Patient ID: Thomas Cintron is a 60 y o  male  Patient presents in the office for a sick visit with complains of discomfort when urinating, associated mild pain on the tip of the penis and whitish penile discharge for about 10 days  He states that about 2 weeks ago he had sexual intercourse without protection, and since then have the symptoms  He denies any fever, chills, abdominal pain, penile lesions or ulcers  He states that the partner does not show any symptoms  He denies any prior hx of STI or urine infection  The following portions of the patient's history were reviewed and updated as appropriate: allergies, current medications, past family history, past medical history, past social history, past surgical history and problem list     Review of Systems   Constitutional: Negative for appetite change, chills, fatigue and fever  Eyes: Negative for visual disturbance  Respiratory: Negative for cough, chest tightness, shortness of breath and wheezing  Cardiovascular: Negative for chest pain, palpitations and leg swelling  Gastrointestinal: Negative for abdominal pain, nausea and vomiting  Genitourinary: Positive for dysuria and penile discharge   Negative for difficulty urinating, flank pain, frequency, genital sores, Assisted patient with positioning and latch. Proper latch obtained and positioning instructions reiterated. Discharge instructions provided and FU number for lactation provided. Patient is able to return demo for proper latch. Pt and SO acknowledged understanding of instructions provided.    hematuria, penile swelling and scrotal swelling  Musculoskeletal: Negative for arthralgias and joint swelling  Skin: Negative for rash  Neurological: Negative for dizziness and headaches  Psychiatric/Behavioral: Negative for confusion  Objective:      /80 (BP Location: Right arm, Patient Position: Sitting, Cuff Size: Standard)   Pulse (!) 54   Temp 98 6 °F (37 °C) (Tympanic)   Ht 6' 2" (1 88 m)   Wt 101 kg (222 lb 12 8 oz)   SpO2 99%   BMI 28 61 kg/m²          Physical Exam  Vitals and nursing note reviewed  Constitutional:       Appearance: He is well-developed  HENT:      Head: Normocephalic and atraumatic  Eyes:      Conjunctiva/sclera: Conjunctivae normal       Pupils: Pupils are equal, round, and reactive to light  Neck:      Thyroid: No thyromegaly  Cardiovascular:      Rate and Rhythm: Normal rate and regular rhythm  Pulmonary:      Effort: Pulmonary effort is normal  No respiratory distress  Abdominal:      General: Bowel sounds are normal  There is no distension  Palpations: Abdomen is soft  Tenderness: There is no abdominal tenderness  Musculoskeletal:         General: Normal range of motion  Cervical back: Normal range of motion and neck supple  Skin:     General: Skin is warm and dry  Capillary Refill: Capillary refill takes less than 2 seconds  Neurological:      Mental Status: He is alert and oriented to person, place, and time  Sensory: No sensory deficit  Motor: No weakness or abnormal muscle tone  Psychiatric:         Thought Content:  Thought content normal          Judgment: Judgment normal

## 2022-12-02 ENCOUNTER — PATIENT MESSAGE (OUTPATIENT)
Dept: FAMILY MEDICINE | Facility: CLINIC | Age: 27
End: 2022-12-02
Payer: MEDICAID

## 2022-12-28 ENCOUNTER — PATIENT MESSAGE (OUTPATIENT)
Dept: FAMILY MEDICINE | Facility: CLINIC | Age: 27
End: 2022-12-28
Payer: MEDICAID

## 2023-02-07 ENCOUNTER — PATIENT MESSAGE (OUTPATIENT)
Dept: FAMILY MEDICINE | Facility: CLINIC | Age: 28
End: 2023-02-07
Payer: MEDICAID

## 2023-02-07 DIAGNOSIS — Z03.818 ENCOUNTER FOR PATIENT CONCERN ABOUT EXPOSURE TO INFECTIOUS ORGANISM: Primary | ICD-10-CM

## 2023-02-09 ENCOUNTER — PATIENT MESSAGE (OUTPATIENT)
Dept: FAMILY MEDICINE | Facility: CLINIC | Age: 28
End: 2023-02-09
Payer: MEDICAID

## 2023-02-09 DIAGNOSIS — M79.671 RIGHT FOOT PAIN: Primary | ICD-10-CM

## 2023-02-15 ENCOUNTER — HOSPITAL ENCOUNTER (EMERGENCY)
Facility: HOSPITAL | Age: 28
Discharge: HOME | End: 2023-02-15
Attending: EMERGENCY MEDICINE
Payer: MEDICAID

## 2023-02-15 VITALS
WEIGHT: 193 LBS | OXYGEN SATURATION: 100 % | HEIGHT: 67 IN | BODY MASS INDEX: 30.29 KG/M2 | TEMPERATURE: 99 F | HEART RATE: 80 BPM | SYSTOLIC BLOOD PRESSURE: 129 MMHG | DIASTOLIC BLOOD PRESSURE: 83 MMHG | RESPIRATION RATE: 18 BRPM

## 2023-02-15 DIAGNOSIS — J01.40 ACUTE PANSINUSITIS, RECURRENCE NOT SPECIFIED: ICD-10-CM

## 2023-02-15 DIAGNOSIS — R05.1 ACUTE COUGH: Primary | ICD-10-CM

## 2023-02-15 PROCEDURE — 99284 EMERGENCY DEPT VISIT MOD MDM: CPT

## 2023-02-15 PROCEDURE — 36415 COLL VENOUS BLD VENIPUNCTURE: CPT | Performed by: EMERGENCY MEDICINE

## 2023-02-15 PROCEDURE — 86803 HEPATITIS C AB TEST: CPT | Performed by: EMERGENCY MEDICINE

## 2023-02-15 PROCEDURE — 87389 HIV-1 AG W/HIV-1&-2 AB AG IA: CPT | Performed by: EMERGENCY MEDICINE

## 2023-02-15 RX ORDER — GUANFACINE 1 MG/1
0.5 TABLET ORAL 2 TIMES DAILY
COMMUNITY
Start: 2023-02-01 | End: 2023-08-15

## 2023-02-15 RX ORDER — AMOXICILLIN AND CLAVULANATE POTASSIUM 875; 125 MG/1; MG/1
1 TABLET, FILM COATED ORAL 2 TIMES DAILY
Qty: 14 TABLET | Refills: 0 | Status: SHIPPED | OUTPATIENT
Start: 2023-02-15 | End: 2023-08-15

## 2023-02-15 RX ORDER — PROMETHAZINE HYDROCHLORIDE AND DEXTROMETHORPHAN HYDROBROMIDE 6.25; 15 MG/5ML; MG/5ML
5 SYRUP ORAL EVERY 4 HOURS PRN
Qty: 120 ML | Refills: 0 | Status: SHIPPED | OUTPATIENT
Start: 2023-02-15 | End: 2023-02-25

## 2023-02-15 RX ORDER — TRAZODONE HYDROCHLORIDE 50 MG/1
50-100 TABLET ORAL NIGHTLY PRN
COMMUNITY
Start: 2023-01-31 | End: 2023-08-15 | Stop reason: SINTOL

## 2023-02-15 RX ORDER — ALBUTEROL SULFATE 90 UG/1
1-2 AEROSOL, METERED RESPIRATORY (INHALATION) EVERY 6 HOURS PRN
Qty: 8 G | Refills: 0 | Status: SHIPPED | OUTPATIENT
Start: 2023-02-15 | End: 2024-02-28

## 2023-02-15 RX ORDER — ESCITALOPRAM OXALATE 10 MG/1
10 TABLET ORAL
COMMUNITY
Start: 2023-01-31 | End: 2023-08-15

## 2023-02-15 NOTE — Clinical Note
"Gustavo"Melody Perry was seen and treated in our emergency department on 2/15/2023.  She may return to work on 02/18/2023.       If you have any questions or concerns, please don't hesitate to call.      Chuy Wasserman NP"

## 2023-02-15 NOTE — ED PROVIDER NOTES
Encounter Date: 2/15/2023       History     Chief Complaint   Patient presents with    General Illness     Cough and sinus congestion x 1 week.      27-year-old patient presented to the ER with a week-long sinus congestion, coughing, taking OTC medications without any improvement, denied shortness breath, chest pain or fever    The history is provided by the patient.   Review of patient's allergies indicates:   Allergen Reactions    Sulfa (sulfonamide antibiotics) Hives    Betadine surgical scrub [povidone-iodine] Rash    Hibiclens (isopropyl alcohol) Rash     Past Medical History:   Diagnosis Date    Anxiety     Bipolar 1 disorder, depressed     Depression     PTSD (post-traumatic stress disorder)      Past Surgical History:   Procedure Laterality Date     SECTION      ESOPHAGOGASTRODUODENOSCOPY N/A 2022    Procedure: EGD (ESOPHAGOGASTRODUODENOSCOPY);  Surgeon: Andrea Manning MD;  Location: [facility] ENDO;  Service: Endoscopy;  Laterality: N/A;    LAPAROSCOPIC CHOLECYSTECTOMY Bilateral 2022    Procedure: CHOLECYSTECTOMY-LAPAROSCOPIC;  Surgeon: Eric Huston MD;  Location: [facility] OR;  Service: General;  Laterality: Bilateral;    TONSILLECTOMY, ADENOIDECTOMY      TUBAL LIGATION      WISDOM TOOTH EXTRACTION       Family History   Problem Relation Age of Onset    Hypertension Mother     No Known Problems Father     Breast cancer Maternal Grandmother     Cancer Maternal Grandmother     Breast cancer Paternal Grandmother     Cancer Paternal Grandmother     Depression Brother      Social History     Tobacco Use    Smoking status: Every Day     Packs/day: 1.00     Years: 15.00     Pack years: 15.00     Types: Cigarettes    Smokeless tobacco: Never   Substance Use Topics    Alcohol use: Not Currently     Comment: socially    Drug use: Never     Review of Systems   HENT:  Positive for congestion, sinus pressure and sinus pain.    Respiratory:  Positive for cough and shortness of breath.    All other  systems reviewed and are negative.    Physical Exam     Initial Vitals [02/15/23 1542]   BP Pulse Resp Temp SpO2   129/83 80 18 98.5 °F (36.9 °C) 100 %      MAP       --         Physical Exam    Nursing note and vitals reviewed.  Constitutional: She appears well-developed and well-nourished. No distress.   HENT:   Head: Normocephalic and atraumatic.   Eyes: EOM are normal. Pupils are equal, round, and reactive to light.   Neck:   Normal range of motion.  Cardiovascular:  Normal rate and regular rhythm.           No murmur heard.  Pulmonary/Chest: Breath sounds normal. No respiratory distress. She has no wheezes. She has no rhonchi. She has no rales. She exhibits no tenderness.   Abdominal: Abdomen is soft.   Musculoskeletal:         General: Normal range of motion.      Cervical back: Normal range of motion.     Neurological: She is alert and oriented to person, place, and time. She has normal strength. GCS score is 15. GCS eye subscore is 4. GCS verbal subscore is 5. GCS motor subscore is 6.   Skin: Skin is warm and dry.   Psychiatric: She has a normal mood and affect.       ED Course   Procedures  Labs Reviewed   HIV 1 / 2 ANTIBODY   HEPATITIS C ANTIBODY          Imaging Results    None          Medications - No data to display                    Medical Decision Making  Problems Addressed:  Acute cough: self-limited or minor problem  Acute pansinusitis, recurrence not specified: acute illness or injury    Risk  Prescription drug management.  Risk Details: Augmentin prescribed for sinus infection             Clinical Impression:   Final diagnoses:  [R05.1] Acute cough (Primary)  [J01.40] Acute pansinusitis, recurrence not specified        ED Disposition Condition    Discharge Stable          ED Prescriptions       Medication Sig Dispense Start Date End Date Auth. Provider    amoxicillin-clavulanate 875-125mg (AUGMENTIN) 875-125 mg per tablet Take 1 tablet by mouth 2 (two) times daily. 14 tablet 2/15/2023 --  Chuy Wasserman NP    promethazine-dextromethorphan (PROMETHAZINE-DM) 6.25-15 mg/5 mL Syrp Take 5 mLs by mouth every 4 (four) hours as needed (cough). 120 mL 2/15/2023 2/25/2023 Chuy Wasserman NP    albuterol (PROVENTIL/VENTOLIN HFA) 90 mcg/actuation inhaler Inhale 1-2 puffs into the lungs every 6 (six) hours as needed for Wheezing. Rescue 8 g 2/15/2023 2/15/2024 Chuy Wasserman NP          Follow-up Information    None          Chuy Wasserman NP  02/15/23 [id number]

## 2023-02-15 NOTE — DISCHARGE INSTRUCTIONS
Take the medications as prescribed. Return for any worsening or new symptoms. Follow up with Primary Care Provider in the next 2-3 days.     
no

## 2023-02-16 ENCOUNTER — OFFICE VISIT (OUTPATIENT)
Dept: FAMILY MEDICINE | Facility: CLINIC | Age: 28
End: 2023-02-16
Payer: MEDICAID

## 2023-02-16 ENCOUNTER — HOSPITAL ENCOUNTER (OUTPATIENT)
Dept: RADIOLOGY | Facility: HOSPITAL | Age: 28
Discharge: HOME | End: 2023-02-16
Attending: FAMILY MEDICINE
Payer: MEDICAID

## 2023-02-16 VITALS
HEART RATE: 70 BPM | OXYGEN SATURATION: 98 % | WEIGHT: 193.81 LBS | DIASTOLIC BLOOD PRESSURE: 75 MMHG | SYSTOLIC BLOOD PRESSURE: 115 MMHG | RESPIRATION RATE: 20 BRPM | BODY MASS INDEX: 30.35 KG/M2

## 2023-02-16 DIAGNOSIS — R20.0 NUMBNESS AND TINGLING: Primary | ICD-10-CM

## 2023-02-16 DIAGNOSIS — E55.9 VITAMIN D DEFICIENCY: ICD-10-CM

## 2023-02-16 DIAGNOSIS — R20.2 NUMBNESS AND TINGLING: Primary | ICD-10-CM

## 2023-02-16 DIAGNOSIS — M79.671 RIGHT FOOT PAIN: ICD-10-CM

## 2023-02-16 LAB
HCV AB SERPL QL IA: NORMAL
HIV 1+2 AB+HIV1 P24 AG SERPL QL IA: NORMAL

## 2023-02-16 PROCEDURE — 1159F MED LIST DOCD IN RCRD: CPT | Mod: S$GLB,,, | Performed by: FAMILY MEDICINE

## 2023-02-16 PROCEDURE — 3078F DIAST BP <80 MM HG: CPT | Mod: S$GLB,,, | Performed by: FAMILY MEDICINE

## 2023-02-16 PROCEDURE — 99214 PR OFFICE/OUTPT VISIT, EST, LEVL IV, 30-39 MIN: ICD-10-PCS | Mod: S$PBB,,, | Performed by: FAMILY MEDICINE

## 2023-02-16 PROCEDURE — 3044F PR MOST RECENT HEMOGLOBIN A1C LEVEL <7.0%: ICD-10-PCS | Mod: S$GLB,,, | Performed by: FAMILY MEDICINE

## 2023-02-16 PROCEDURE — 73630 XR FOOT COMPLETE 3 VIEW RIGHT: ICD-10-PCS | Mod: 26,RT,, | Performed by: RADIOLOGY

## 2023-02-16 PROCEDURE — 1159F PR MEDICATION LIST DOCUMENTED IN MEDICAL RECORD: ICD-10-PCS | Mod: S$GLB,,, | Performed by: FAMILY MEDICINE

## 2023-02-16 PROCEDURE — 73630 X-RAY EXAM OF FOOT: CPT | Mod: TC,RT

## 2023-02-16 PROCEDURE — 3008F BODY MASS INDEX DOCD: CPT | Mod: S$GLB,,, | Performed by: FAMILY MEDICINE

## 2023-02-16 PROCEDURE — 3008F PR BODY MASS INDEX (BMI) DOCUMENTED: ICD-10-PCS | Mod: S$GLB,,, | Performed by: FAMILY MEDICINE

## 2023-02-16 PROCEDURE — 3074F PR MOST RECENT SYSTOLIC BLOOD PRESSURE < 130 MM HG: ICD-10-PCS | Mod: S$GLB,,, | Performed by: FAMILY MEDICINE

## 2023-02-16 PROCEDURE — 3078F PR MOST RECENT DIASTOLIC BLOOD PRESSURE < 80 MM HG: ICD-10-PCS | Mod: S$GLB,,, | Performed by: FAMILY MEDICINE

## 2023-02-16 PROCEDURE — 99214 OFFICE O/P EST MOD 30 MIN: CPT | Mod: S$PBB,,, | Performed by: FAMILY MEDICINE

## 2023-02-16 PROCEDURE — 99999 PR PBB SHADOW E&M-EST. PATIENT-LVL III: CPT | Mod: PBBFAC,,, | Performed by: FAMILY MEDICINE

## 2023-02-16 PROCEDURE — 99213 OFFICE O/P EST LOW 20 MIN: CPT | Mod: PBBFAC | Performed by: FAMILY MEDICINE

## 2023-02-16 PROCEDURE — 99999 PR PBB SHADOW E&M-EST. PATIENT-LVL III: ICD-10-PCS | Mod: PBBFAC,,, | Performed by: FAMILY MEDICINE

## 2023-02-16 PROCEDURE — 3074F SYST BP LT 130 MM HG: CPT | Mod: S$GLB,,, | Performed by: FAMILY MEDICINE

## 2023-02-16 PROCEDURE — 73630 X-RAY EXAM OF FOOT: CPT | Mod: 26,RT,, | Performed by: RADIOLOGY

## 2023-02-16 PROCEDURE — 3044F HG A1C LEVEL LT 7.0%: CPT | Mod: S$GLB,,, | Performed by: FAMILY MEDICINE

## 2023-02-16 NOTE — PROGRESS NOTES
[facility] - Clinic Note    Subjective      Ms. Perry is a 27 y.o. female who presents to clinic with complaints of foot pain.     Reports that she dropped a bottle of pinesole on her right foot about 3 weeks ago and has still been having pain. Painful to walk.   Xray was order which was unremarkable.     Reports numbness and tingling all her hands bilaterally.  Present for the past few months.   All over the hands.   No triggers or alleviating factors.   Denies trauma or swelling      [facility] Gustavo has a past medical history of Anxiety, Bipolar 1 disorder, depressed, Depression, and PTSD (post-traumatic stress disorder).   PSXH Gustavo has a past surgical history that includes TONSILLECTOMY, ADENOIDECTOMY; [place] tooth extraction; Tubal ligation;  section; Esophagogastroduodenoscopy (N/A, 2022); and Laparoscopic cholecystectomy (Bilateral, 2022).    Gustavo's family history includes Breast cancer in her maternal grandmother and paternal grandmother; Cancer in her maternal grandmother and paternal grandmother; Depression in her brother; Hypertension in her mother; No Known Problems in her father.    Gustavo reports that she has been smoking cigarettes. She has a 15.00 pack-year smoking history. She has never used smokeless tobacco. She reports that she does not currently use alcohol. She reports that she does not use drugs.   ELROY Chen is allergic to sulfa (sulfonamide antibiotics), betadine surgical scrub [povidone-iodine], and hibiclens (isopropyl alcohol).   MED Gustavo has a current medication list which includes the following prescription(s): escitalopram oxalate, guanfacine, iron-vitamin c 100-250 mg (icar-c), pantoprazole, trazodone, albuterol, amoxicillin-clavulanate 875-125mg, ergocalciferol, and valacyclovir.     Review of Systems   Constitutional:  Negative for activity change, appetite change, chills, fatigue and fever.   Eyes:  Negative for visual disturbance.   Respiratory:  Negative  for cough and shortness of breath.    Cardiovascular:  Negative for chest pain, palpitations and leg swelling.   Gastrointestinal:  Negative for abdominal pain, nausea and vomiting.   Musculoskeletal:  Positive for arthralgias.        Right foot pain   Skin:  Negative for wound.   Neurological:  Positive for numbness. Negative for dizziness and headaches.   Psychiatric/Behavioral:  Negative for confusion.    Objective     /75 (BP Location: Left arm, Patient Position: Sitting, BP Method: Medium (Manual))   Pulse 70   Resp 20   Wt 87.9 kg (193 lb 12.8 oz)   LMP 01/25/2023 (Exact Date)   SpO2 98%   BMI 30.35 kg/m²     Physical Exam   Constitutional: normal appearance. She appears well-developed, well-nourished and obese.  Non-toxic appearance. No distress. She does not appear ill.   HENT:   Head: Normocephalic and atraumatic.   Eyes: Right eye exhibits no discharge. Left eye exhibits no discharge.   Cardiovascular: Normal rate, regular rhythm, normal heart sounds and normal pulses. Exam reveals no gallop and no friction rub.   No murmur heard.Pulmonary:      Effort: Pulmonary effort is normal. No respiratory distress.      Breath sounds: Normal breath sounds. No wheezing, rhonchi or rales.     Abdominal: Normal appearance.   Musculoskeletal:         General: No swelling, tenderness, deformity or signs of injury. Normal range of motion.      Cervical back: Neck supple.      Right lower leg: No edema.      Left lower leg: No edema.   Lymphadenopathy:     She has no cervical adenopathy.   Neurological: She is alert.   Skin: Skin is warm and dry. Capillary refill takes less than 2 seconds. She is not diaphoretic.   Psychiatric: Her behavior is normal. Mood, judgment and thought content normal.   Vitals reviewed.   Assessment/Plan     Gustavo was seen today for ankle pain.    Diagnoses and all orders for this visit:    Numbness and tingling  -     Vitamin B12; Future  -     Vitamin B6; Future  -     CBC auto  differential; Future  -     Comprehensive metabolic panel; Future  -     Hemoglobin A1c; Future  -     TSH; Future    Right foot pain  -likely a contusion. Recommended RICE.     Vitamin D deficiency  -     Vitamin D 25-Hydroxy; Future          Future Appointments   Date Time Provider Department Center   4/4/2023 10:00 AM Wale James MD [facility]       Wale James MD  Family Medicine  [facility]

## 2023-02-17 ENCOUNTER — PATIENT MESSAGE (OUTPATIENT)
Dept: FAMILY MEDICINE | Facility: CLINIC | Age: 28
End: 2023-02-17
Payer: MEDICAID

## 2023-02-17 DIAGNOSIS — R20.0 NUMBNESS AND TINGLING: Primary | ICD-10-CM

## 2023-02-17 DIAGNOSIS — R20.2 NUMBNESS AND TINGLING: Primary | ICD-10-CM

## 2023-02-20 ENCOUNTER — PATIENT MESSAGE (OUTPATIENT)
Dept: FAMILY MEDICINE | Facility: CLINIC | Age: 28
End: 2023-02-20
Payer: MEDICAID

## 2023-02-20 DIAGNOSIS — E55.9 VITAMIN D DEFICIENCY: ICD-10-CM

## 2023-02-20 DIAGNOSIS — D50.9 MICROCYTIC HYPOCHROMIC ANEMIA: Primary | ICD-10-CM

## 2023-02-20 RX ORDER — ERGOCALCIFEROL 1.25 MG/1
50000 CAPSULE ORAL
Qty: 4 CAPSULE | Refills: 1 | Status: SHIPPED | OUTPATIENT
Start: 2023-02-20 | End: 2023-04-11

## 2023-03-01 ENCOUNTER — PATIENT MESSAGE (OUTPATIENT)
Dept: FAMILY MEDICINE | Facility: CLINIC | Age: 28
End: 2023-03-01
Payer: MEDICAID

## 2023-03-02 ENCOUNTER — PATIENT MESSAGE (OUTPATIENT)
Dept: FAMILY MEDICINE | Facility: CLINIC | Age: 28
End: 2023-03-02
Payer: MEDICAID

## 2023-03-02 RX ORDER — VALACYCLOVIR HYDROCHLORIDE 500 MG/1
500 TABLET, FILM COATED ORAL 2 TIMES DAILY
Qty: 60 TABLET | Refills: 11 | Status: SHIPPED | OUTPATIENT
Start: 2023-03-02 | End: 2024-03-25

## 2023-03-28 ENCOUNTER — PATIENT MESSAGE (OUTPATIENT)
Dept: FAMILY MEDICINE | Facility: CLINIC | Age: 28
End: 2023-03-28
Payer: COMMERCIAL

## 2023-04-04 ENCOUNTER — PATIENT MESSAGE (OUTPATIENT)
Dept: SURGERY | Facility: CLINIC | Age: 28
End: 2023-04-04
Payer: COMMERCIAL

## 2023-04-20 ENCOUNTER — PATIENT MESSAGE (OUTPATIENT)
Dept: SURGERY | Facility: CLINIC | Age: 28
End: 2023-04-20
Payer: COMMERCIAL

## 2023-05-02 ENCOUNTER — OFFICE VISIT (OUTPATIENT)
Dept: SURGERY | Facility: CLINIC | Age: 28
End: 2023-05-02
Payer: MEDICAID

## 2023-05-02 VITALS
HEART RATE: 82 BPM | SYSTOLIC BLOOD PRESSURE: 116 MMHG | WEIGHT: 192 LBS | OXYGEN SATURATION: 98 % | HEIGHT: 67 IN | BODY MASS INDEX: 30.13 KG/M2 | DIASTOLIC BLOOD PRESSURE: 77 MMHG

## 2023-05-02 DIAGNOSIS — R68.81 EARLY SATIETY: ICD-10-CM

## 2023-05-02 DIAGNOSIS — R11.0 NAUSEA: Primary | ICD-10-CM

## 2023-05-02 PROCEDURE — 99214 OFFICE O/P EST MOD 30 MIN: CPT | Mod: S$PBB,,, | Performed by: SURGERY

## 2023-05-02 PROCEDURE — 3044F PR MOST RECENT HEMOGLOBIN A1C LEVEL <7.0%: ICD-10-PCS | Mod: CPTII,,, | Performed by: SURGERY

## 2023-05-02 PROCEDURE — 3074F PR MOST RECENT SYSTOLIC BLOOD PRESSURE < 130 MM HG: ICD-10-PCS | Mod: CPTII,,, | Performed by: SURGERY

## 2023-05-02 PROCEDURE — 3078F PR MOST RECENT DIASTOLIC BLOOD PRESSURE < 80 MM HG: ICD-10-PCS | Mod: CPTII,,, | Performed by: SURGERY

## 2023-05-02 PROCEDURE — 1159F PR MEDICATION LIST DOCUMENTED IN MEDICAL RECORD: ICD-10-PCS | Mod: CPTII,,, | Performed by: SURGERY

## 2023-05-02 PROCEDURE — 99999 PR PBB SHADOW E&M-EST. PATIENT-LVL IV: ICD-10-PCS | Mod: PBBFAC,,, | Performed by: SURGERY

## 2023-05-02 PROCEDURE — 99999 PR PBB SHADOW E&M-EST. PATIENT-LVL IV: CPT | Mod: PBBFAC,,, | Performed by: SURGERY

## 2023-05-02 PROCEDURE — 3008F PR BODY MASS INDEX (BMI) DOCUMENTED: ICD-10-PCS | Mod: CPTII,,, | Performed by: SURGERY

## 2023-05-02 PROCEDURE — 99214 OFFICE O/P EST MOD 30 MIN: CPT | Mod: PBBFAC | Performed by: SURGERY

## 2023-05-02 PROCEDURE — 99214 PR OFFICE/OUTPT VISIT, EST, LEVL IV, 30-39 MIN: ICD-10-PCS | Mod: S$PBB,,, | Performed by: SURGERY

## 2023-05-02 PROCEDURE — 1159F MED LIST DOCD IN RCRD: CPT | Mod: CPTII,,, | Performed by: SURGERY

## 2023-05-02 PROCEDURE — 3078F DIAST BP <80 MM HG: CPT | Mod: CPTII,,, | Performed by: SURGERY

## 2023-05-02 PROCEDURE — 3044F HG A1C LEVEL LT 7.0%: CPT | Mod: CPTII,,, | Performed by: SURGERY

## 2023-05-02 PROCEDURE — 3074F SYST BP LT 130 MM HG: CPT | Mod: CPTII,,, | Performed by: SURGERY

## 2023-05-02 PROCEDURE — 3008F BODY MASS INDEX DOCD: CPT | Mod: CPTII,,, | Performed by: SURGERY

## 2023-05-02 RX ORDER — SODIUM CHLORIDE 9 MG/ML
INJECTION, SOLUTION INTRAVENOUS CONTINUOUS
Status: CANCELLED | OUTPATIENT
Start: 2023-05-02

## 2023-05-02 NOTE — PROGRESS NOTES
Patient given pre-op instructions for EGD. Instructions given for all pre-op, john-op, and post-op appointments. Patient given blue folder with all written instructions. EGD scheduled for June 19, 2023

## 2023-05-02 NOTE — H&P
[facility] Surgery H&P Note    Subjective:       Patient ID: Gustavo Perry is a 27 y.o. female.    Chief Complaint:  Nausea.  Early satiety.  HPI:  Gustavo Perry is a 27 y.o. female history of bipolar and PTSD presents today as a established patient surgery Clinic with new issue.  Patient states in the last few months she is noticed nausea after eating small amounts of food.  Early satiety.  Decreased food intake.  No vomiting.  Mild epigastric pain.  History of significant gastritis as well as duodenitis and reflux esophagitis found on EGD done back last summer.  She is had her gallbladder out since that time.  Symptoms improved after cholecystectomy.  However these are new symptoms now.  She presents today surgery Clinic for evaluation.    Past Medical History:   Diagnosis Date    Anxiety     Bipolar 1 disorder, depressed     Depression     PTSD (post-traumatic stress disorder)      Past Surgical History:   Procedure Laterality Date     SECTION      ESOPHAGOGASTRODUODENOSCOPY N/A 2022    Procedure: EGD (ESOPHAGOGASTRODUODENOSCOPY);  Surgeon: Andrea Manning MD;  Location: [facility] ENDO;  Service: Endoscopy;  Laterality: N/A;    LAPAROSCOPIC CHOLECYSTECTOMY Bilateral 2022    Procedure: CHOLECYSTECTOMY-LAPAROSCOPIC;  Surgeon: Eric Huston MD;  Location: [facility] OR;  Service: General;  Laterality: Bilateral;    TONSILLECTOMY, ADENOIDECTOMY      TUBAL LIGATION      WISDOM TOOTH EXTRACTION       Family History   Problem Relation Age of Onset    Hypertension Mother     No Known Problems Father     Breast cancer Maternal Grandmother     Cancer Maternal Grandmother     Breast cancer Paternal Grandmother     Cancer Paternal Grandmother     Depression Brother      Social History     Socioeconomic History    Marital status:    Tobacco Use    Smoking status: Every Day     Packs/day: 1.00     Years: 15.00     Pack years: 15.00     Types: Cigarettes    Smokeless tobacco: Never   Substance  and Sexual Activity    Alcohol use: Not Currently     Comment: socially    Drug use: Never    Sexual activity: Yes     Partners: Male     Birth control/protection: See Surgical Hx, None     Comment: Tubal Ligation -        Current Outpatient Medications   Medication Sig Dispense Refill    albuterol (PROVENTIL/VENTOLIN HFA) 90 mcg/actuation inhaler Inhale 1-2 puffs into the lungs every 6 (six) hours as needed for Wheezing. Rescue 8 g 0    amoxicillin-clavulanate 875-125mg (AUGMENTIN) 875-125 mg per tablet Take 1 tablet by mouth 2 (two) times daily. 14 tablet 0    EScitalopram oxalate (LEXAPRO) 10 MG tablet Take 10 mg by mouth.      guanFACINE (TENEX) 1 MG Tab Take 0.5 mg by mouth 2 (two) times daily.      iron-vitamin C 100-250 mg, ICAR-C, (ICAR-C) 100-250 mg Tab Take 1 tablet by mouth once daily at 6am. 30 tablet 11    pantoprazole (PROTONIX) 40 MG tablet Take 1 tablet (40 mg total) by mouth once daily. 30 tablet 11    traZODone (DESYREL) 50 MG tablet Take  mg by mouth nightly as needed.      valACYclovir (VALTREX) 500 MG tablet Take 1 tablet (500 mg total) by mouth 2 (two) times daily. 60 tablet 11     No current facility-administered medications for this visit.     Review of patient's allergies indicates:   Allergen Reactions    Sulfa (sulfonamide antibiotics) Hives    Betadine surgical scrub [povidone-iodine] Rash    Hibiclens (isopropyl alcohol) Rash       Review of Systems   Constitutional:  Negative for activity change, appetite change, chills and fever.   HENT:  Negative for congestion, dental problem and ear discharge.    Eyes:  Negative for discharge and itching.   Respiratory:  Negative for apnea, choking and chest tightness.    Cardiovascular:  Negative for chest pain and leg swelling.   Gastrointestinal:  Positive for nausea. Negative for abdominal distention, abdominal pain, anal bleeding, constipation and diarrhea.   Endocrine: Negative for cold intolerance and heat intolerance.  "  Genitourinary:  Negative for difficulty urinating and dyspareunia.   Musculoskeletal:  Negative for arthralgias and back pain.   Skin:  Negative for color change and pallor.   Neurological:  Negative for dizziness and facial asymmetry.   Hematological:  Negative for adenopathy. Does not bruise/bleed easily.   Psychiatric/Behavioral:  Negative for agitation and behavioral problems.      Objective:      Vitals:    05/02/23 1434   BP: 116/77   Pulse: 82   SpO2: 98%   Weight: 87.1 kg (192 lb)   Height: 5' 7" (1.702 m)     Physical Exam  Constitutional:       General: She is not in acute distress.     Appearance: She is well-developed.   HENT:      Head: Normocephalic and atraumatic.   Eyes:      Pupils: Pupils are equal, round, and reactive to light.   Neck:      Thyroid: No thyromegaly.   Cardiovascular:      Rate and Rhythm: Normal rate and regular rhythm.   Pulmonary:      Effort: Pulmonary effort is normal.      Breath sounds: Normal breath sounds.   Abdominal:      General: Bowel sounds are normal. There is no distension.      Palpations: Abdomen is soft.      Tenderness: There is no abdominal tenderness.   Musculoskeletal:         General: No deformity. Normal range of motion.      Cervical back: Normal range of motion and neck supple.   Skin:     General: Skin is warm.      Capillary Refill: Capillary refill takes less than 2 seconds.      Findings: No erythema.   Neurological:      Mental Status: She is alert and oriented to person, place, and time.      Cranial Nerves: No cranial nerve deficit.   Psychiatric:         Behavior: Behavior normal.        Assessment:       1. Nausea    2. Early satiety        Plan:   Nausea  -     NM Gastric Emptying; Future; Expected date: 05/02/2023  -     Full code; Standing  -     Place in Outpatient; Standing  -     Case Request Operating Room: ESOPHAGOGASTRODUODENOSCOPY (EGD)  -     Vital signs; Standing  -     Insert peripheral IV; Standing  -     POCT glucose; Standing  -  "    Diet NPO; Standing  -     Place MARIA A hose; Standing  -     Place sequential compression device; Standing    Early satiety  -     NM Gastric Emptying; Future; Expected date: 05/02/2023  -     Full code; Standing  -     Place in Outpatient; Standing  -     Case Request Operating Room: ESOPHAGOGASTRODUODENOSCOPY (EGD)  -     Vital signs; Standing  -     Insert peripheral IV; Standing  -     POCT glucose; Standing  -     Diet NPO; Standing  -     Place MARIA A hose; Standing  -     Place sequential compression device; Standing    Other orders  -     0.9%  NaCl infusion  -     IP VTE LOW RISK PATIENT; Standing        Medical Decision Making/Counseling:  Nausea.  Early satiety.  History of significant gastritis duodenitis and reflux esophagitis.  Can not rule out peptic ulcer disease H.pylori infection, etcetera as a cause.  Will recommend EGD further evaluation.  Additionally patient without history of gastric emptying study.  Recommendation be gastric emptying study to go along with EGD further management.  Possible need for promotility agents to include Reglan erythromycin.  Possible need for increased acid suppression, etcetera.  Further management patient will depend upon workup evaluation.  Risk of aspiration perforation bleeding were discussed associated with EGD.    Patient instructed that best way to communicate with my office staff is for patient to get on the [facility] [employer] patient portal to expedite communication and communication issues that may occur.  Patient was given instructions on how to get on the portal.  I encouraged patient to obtain portal access as well.  Ultimately it is up to the patient to obtain access.  Patient voiced understanding.

## 2023-05-02 NOTE — PATIENT INSTRUCTIONS
"EGD instructions     Date of procedure:  June 19, 2023    Do not take the following Medications for 3 days prior to your procedure:   Aspirin, Excedrin, BC powder or goodies powders   Ibuprofen or Motrin  Naproxen or Aleve  Fish oils  Vitamin E    Location of Department:   [facility] [address], [place] [address]    Parking:    Use parking lot 1"  Enter at the main hospital entrance  Check in with outpatient registration    Contact:   Someone from surgery will call you with a time of arrival.   If you surgery is on Monday, someone will contact you on Friday.  If you do not receive a call from surgery by 2pm the business day (June 16, 2023) before your procedure, please call [phone].       Food/Drink   Do not eat or drink after Midnight       Medications:   Take all morning medications after the procedure      Transportation:   You will NOT be able to drive yourself.  You are required to have someone drive you home from the hospital due to the anesthesia.            "

## 2023-05-05 ENCOUNTER — PATIENT MESSAGE (OUTPATIENT)
Dept: FAMILY MEDICINE | Facility: CLINIC | Age: 28
End: 2023-05-05
Payer: COMMERCIAL

## 2023-05-15 ENCOUNTER — HOSPITAL ENCOUNTER (OUTPATIENT)
Dept: RADIOLOGY | Facility: HOSPITAL | Age: 28
Discharge: HOME | End: 2023-05-15
Attending: SURGERY
Payer: MEDICAID

## 2023-05-15 DIAGNOSIS — R68.81 EARLY SATIETY: ICD-10-CM

## 2023-05-15 DIAGNOSIS — R11.0 NAUSEA: ICD-10-CM

## 2023-05-15 PROCEDURE — 78264 GASTRIC EMPTYING IMG STUDY: CPT | Mod: 26,,, | Performed by: RADIOLOGY

## 2023-05-15 PROCEDURE — 78264 NM GASTRIC EMPTYING: ICD-10-PCS | Mod: 26,,, | Performed by: RADIOLOGY

## 2023-05-15 PROCEDURE — 78264 GASTRIC EMPTYING IMG STUDY: CPT | Mod: TC

## 2023-05-15 PROCEDURE — A9541 TC99M SULFUR COLLOID: HCPCS

## 2023-05-16 ENCOUNTER — PATIENT MESSAGE (OUTPATIENT)
Dept: SURGERY | Facility: HOSPITAL | Age: 28
End: 2023-05-16
Payer: COMMERCIAL

## 2023-05-18 ENCOUNTER — OFFICE VISIT (OUTPATIENT)
Dept: SURGERY | Facility: CLINIC | Age: 28
End: 2023-05-18
Payer: MEDICAID

## 2023-05-18 DIAGNOSIS — K30 DELAYED GASTRIC EMPTYING: Primary | ICD-10-CM

## 2023-05-18 PROCEDURE — 99999 PR PBB SHADOW E&M-EST. PATIENT-LVL II: CPT | Mod: PBBFAC,,, | Performed by: SURGERY

## 2023-05-18 PROCEDURE — 1159F PR MEDICATION LIST DOCUMENTED IN MEDICAL RECORD: ICD-10-PCS | Mod: CPTII,,, | Performed by: SURGERY

## 2023-05-18 PROCEDURE — 99214 OFFICE O/P EST MOD 30 MIN: CPT | Mod: S$PBB,,, | Performed by: SURGERY

## 2023-05-18 PROCEDURE — 99999 PR PBB SHADOW E&M-EST. PATIENT-LVL II: ICD-10-PCS | Mod: PBBFAC,,, | Performed by: SURGERY

## 2023-05-18 PROCEDURE — 99212 OFFICE O/P EST SF 10 MIN: CPT | Mod: PBBFAC | Performed by: SURGERY

## 2023-05-18 PROCEDURE — 3044F PR MOST RECENT HEMOGLOBIN A1C LEVEL <7.0%: ICD-10-PCS | Mod: CPTII,,, | Performed by: SURGERY

## 2023-05-18 PROCEDURE — 1159F MED LIST DOCD IN RCRD: CPT | Mod: CPTII,,, | Performed by: SURGERY

## 2023-05-18 PROCEDURE — 3044F HG A1C LEVEL LT 7.0%: CPT | Mod: CPTII,,, | Performed by: SURGERY

## 2023-05-18 PROCEDURE — 99214 PR OFFICE/OUTPT VISIT, EST, LEVL IV, 30-39 MIN: ICD-10-PCS | Mod: S$PBB,,, | Performed by: SURGERY

## 2023-05-18 RX ORDER — METOCLOPRAMIDE 5 MG/1
5 TABLET ORAL 4 TIMES DAILY
Qty: 120 TABLET | Refills: 0 | Status: SHIPPED | OUTPATIENT
Start: 2023-05-18 | End: 2023-06-17

## 2023-05-18 NOTE — PROGRESS NOTES
[facility] Surgery  Follow-up    Subjective:     Chief Complaint:  Follow-up gastric emptying study with abdominal symptomatology.    HPI:  Gustavo Perry is a 27 y.o. female presents today for follow-up examination.  Patient's last visit has done well.  She is undergone gastric emptying study.  Continued abdominal symptomatology.  Gastric emptying study confirms evidence of delayed gastric emptying.  Patient presents today for follow-up evaluation    Review of Systems   Constitutional:  Negative for activity change, appetite change, chills and fever.   HENT:  Negative for congestion, dental problem and ear discharge.    Eyes:  Negative for discharge and itching.   Respiratory:  Negative for apnea, choking and chest tightness.    Cardiovascular:  Negative for chest pain and leg swelling.   Gastrointestinal:  Positive for nausea. Negative for abdominal distention, abdominal pain, anal bleeding, constipation and diarrhea.   Endocrine: Negative for cold intolerance and heat intolerance.   Genitourinary:  Negative for difficulty urinating and dyspareunia.   Musculoskeletal:  Negative for arthralgias and back pain.   Skin:  Negative for color change and pallor.   Neurological:  Negative for dizziness and facial asymmetry.   Hematological:  Negative for adenopathy. Does not bruise/bleed easily.   Psychiatric/Behavioral:  Negative for agitation and behavioral problems.      Objective:      There were no vitals filed for this visit.  Physical Exam  Constitutional:       General: She is not in acute distress.     Appearance: She is well-developed.   HENT:      Head: Normocephalic and atraumatic.   Eyes:      Pupils: Pupils are equal, round, and reactive to light.   Neck:      Thyroid: No thyromegaly.   Cardiovascular:      Rate and Rhythm: Normal rate and regular rhythm.   Pulmonary:      Effort: Pulmonary effort is normal.      Breath sounds: Normal breath sounds.   Abdominal:      General: Bowel sounds are  normal. There is no distension.      Palpations: Abdomen is soft.      Tenderness: There is no abdominal tenderness.   Musculoskeletal:         General: No deformity. Normal range of motion.      Cervical back: Normal range of motion and neck supple.   Skin:     General: Skin is warm.      Capillary Refill: Capillary refill takes less than 2 seconds.      Findings: No erythema.   Neurological:      Mental Status: She is alert and oriented to person, place, and time.      Cranial Nerves: No cranial nerve deficit.   Psychiatric:         Behavior: Behavior normal.     Gastric emptying study with delayed gastric emptying.     Assessment:       1. Delayed gastric emptying        Plan:   Delayed gastric emptying  -     metoclopramide HCl (REGLAN) 5 MG tablet; Take 1 tablet (5 mg total) by mouth 4 (four) times daily.  Dispense: 120 tablet; Refill: 0        Medical Decision Making/Counseling:  Recommendation be small meals multiple times a day.  Increasing time from last meal to laying down at night.  Start Reglan 5 mg 30 minutes prior to meal ingestion.  Patient notified of red flag symptoms of tardive dyskinesia and notified to start medication if symptoms were to occur.  Follow-up surgery clinic 6 weeks.    Follow up:  6 weeks.    Patient instructed that best way to communicate with my office staff is for patient to get on the [facility] epic patient portal to expedite communication and communication issues that may occur.  Patient was given instructions on how to get on the portal.  I encouraged patient to obtain portal access as well.  Ultimately it is up to the patient to obtain access.  Patient voiced understanding.

## 2023-05-24 ENCOUNTER — HOSPITAL ENCOUNTER (EMERGENCY)
Facility: HOSPITAL | Age: 28
Discharge: HOME | End: 2023-05-24
Attending: EMERGENCY MEDICINE
Payer: MEDICAID

## 2023-05-24 VITALS
SYSTOLIC BLOOD PRESSURE: 111 MMHG | RESPIRATION RATE: 15 BRPM | WEIGHT: 191 LBS | BODY MASS INDEX: 29.91 KG/M2 | OXYGEN SATURATION: 98 % | TEMPERATURE: 98 F | DIASTOLIC BLOOD PRESSURE: 69 MMHG | HEART RATE: 58 BPM

## 2023-05-24 DIAGNOSIS — J06.9 UPPER RESPIRATORY TRACT INFECTION, UNSPECIFIED TYPE: Primary | ICD-10-CM

## 2023-05-24 DIAGNOSIS — R05.9 COUGH: ICD-10-CM

## 2023-05-24 DIAGNOSIS — J22 LOWER RESP. TRACT INFECTION: ICD-10-CM

## 2023-05-24 LAB
B-HCG UR QL: NEGATIVE
BACTERIA #/AREA URNS HPF: NEGATIVE /HPF
BILIRUB UR QL STRIP: NEGATIVE
CLARITY UR: CLEAR
COLOR UR: YELLOW
CTP QC/QA: YES
GLUCOSE UR QL STRIP: NEGATIVE
HGB UR QL STRIP: ABNORMAL
HYALINE CASTS #/AREA URNS LPF: 3 /LPF
INFLUENZA A, MOLECULAR: NEGATIVE
INFLUENZA B, MOLECULAR: NEGATIVE
KETONES UR QL STRIP: NEGATIVE
LEUKOCYTE ESTERASE UR QL STRIP: NEGATIVE
MICROSCOPIC COMMENT: ABNORMAL
NITRITE UR QL STRIP: NEGATIVE
PH UR STRIP: 7 [PH] (ref 5–8)
PROT UR QL STRIP: NEGATIVE
RBC #/AREA URNS HPF: 4 /HPF (ref 0–4)
SARS-COV-2 RDRP RESP QL NAA+PROBE: NEGATIVE
SP GR UR STRIP: 1.02 (ref 1–1.03)
SPECIMEN SOURCE: NORMAL
SQUAMOUS #/AREA URNS HPF: 1 /HPF
URN SPEC COLLECT METH UR: ABNORMAL
UROBILINOGEN UR STRIP-ACNC: NEGATIVE EU/DL
WBC #/AREA URNS HPF: 0 /HPF (ref 0–5)

## 2023-05-24 PROCEDURE — 99284 EMERGENCY DEPT VISIT MOD MDM: CPT | Mod: 25

## 2023-05-24 PROCEDURE — 81025 URINE PREGNANCY TEST: CPT | Performed by: EMERGENCY MEDICINE

## 2023-05-24 PROCEDURE — U0002 COVID-19 LAB TEST NON-CDC: HCPCS | Performed by: EMERGENCY MEDICINE

## 2023-05-24 PROCEDURE — 81001 URINALYSIS AUTO W/SCOPE: CPT | Performed by: EMERGENCY MEDICINE

## 2023-05-24 PROCEDURE — 87502 INFLUENZA DNA AMP PROBE: CPT | Performed by: EMERGENCY MEDICINE

## 2023-05-24 RX ORDER — AZITHROMYCIN 500 MG/1
500 TABLET, FILM COATED ORAL DAILY
Qty: 5 TABLET | Refills: 0 | Status: SHIPPED | OUTPATIENT
Start: 2023-05-24 | End: 2023-05-29

## 2023-05-24 RX ORDER — FLUTICASONE PROPIONATE 50 MCG
1 SPRAY, SUSPENSION (ML) NASAL 2 TIMES DAILY
Qty: 15 G | Refills: 0 | Status: SHIPPED | OUTPATIENT
Start: 2023-05-24 | End: 2023-06-03

## 2023-05-24 NOTE — DISCHARGE INSTRUCTIONS
Please read and follow discharge instructions and return precautions.  Rest, avoid any strenuous activity, over exertion or overheating.  Keep well hydrated.  Alternate water and Pedialyte.  Take a Zyrtec over-the-counter daily for the next 7-10 days.  Use saline nasal spray.  Important to see your primary care provider in the next 2 days.  Return immediately if you develop new or worsening symptoms or if you have new problems or concerns.

## 2023-05-24 NOTE — ED PROVIDER NOTES
Encounter Date: 2023       History     Chief Complaint   Patient presents with    Cough    Nasal Congestion     This is a 27-year-old female who presents complaining of cough and congestion.  The patient reports symptoms started 5 days ago.  She reports she initially had nasal congestion followed by postnasal drip then frequent coughing.  The cough has been nonproductive.  She denies wheezing or shortness of breath.  She is had fatigue but no fever.  She denies lightheadedness syncope or near-syncope.  She has a mildly sore throat but is able to eat drink and swallow.  She denies any headache, neck pain or stiffness.  Her appetite has somewhat decreased.  She denies any other problems or complaints.  Her symptoms occurred after baby-sitting a 2-month-old with respiratory symptoms.      Review of patient's allergies indicates:   Allergen Reactions    Sulfa (sulfonamide antibiotics) Hives    Betadine surgical scrub [povidone-iodine] Rash    Hibiclens (isopropyl alcohol) Rash     Past Medical History:   Diagnosis Date    Anxiety     Bipolar 1 disorder, depressed     Depression     PTSD (post-traumatic stress disorder)      Past Surgical History:   Procedure Laterality Date     SECTION      ESOPHAGOGASTRODUODENOSCOPY N/A 2022    Procedure: EGD (ESOPHAGOGASTRODUODENOSCOPY);  Surgeon: Andrea Manning MD;  Location: [facility] ENDO;  Service: Endoscopy;  Laterality: N/A;    LAPAROSCOPIC CHOLECYSTECTOMY Bilateral 2022    Procedure: CHOLECYSTECTOMY-LAPAROSCOPIC;  Surgeon: Eric Huston MD;  Location: [facility] OR;  Service: General;  Laterality: Bilateral;    TONSILLECTOMY, ADENOIDECTOMY      TUBAL LIGATION      WISDOM TOOTH EXTRACTION       Family History   Problem Relation Age of Onset    Hypertension Mother     No Known Problems Father     Breast cancer Maternal Grandmother     Cancer Maternal Grandmother     Breast cancer Paternal Grandmother     Cancer Paternal Grandmother     Depression Brother       Social History     Tobacco Use    Smoking status: Every Day     Packs/day: 1.00     Years: 15.00     Pack years: 15.00     Types: Cigarettes    Smokeless tobacco: Never   Substance Use Topics    Alcohol use: Not Currently     Comment: socially    Drug use: Never     Review of Systems   Constitutional:  Positive for activity change, appetite change and fatigue. Negative for chills and fever.   HENT:  Positive for congestion, postnasal drip, rhinorrhea, sinus pressure and sore throat. Negative for drooling, ear discharge, ear pain, nosebleeds, trouble swallowing and voice change.    Eyes: Negative.  Negative for photophobia, pain, redness and visual disturbance.   Respiratory:  Positive for cough. Negative for chest tightness, shortness of breath and wheezing.    Cardiovascular: Negative.  Negative for chest pain, palpitations and leg swelling.   Gastrointestinal: Negative.  Negative for abdominal distention, abdominal pain, blood in stool, constipation, diarrhea, nausea and vomiting.   Endocrine: Negative.    Genitourinary: Negative.  Negative for decreased urine volume, difficulty urinating, dysuria, flank pain, frequency, pelvic pain, urgency and vaginal discharge.   Musculoskeletal: Negative.  Negative for arthralgias, back pain, gait problem, myalgias, neck pain and neck stiffness.   Skin: Negative.  Negative for rash.   Neurological: Negative.  Negative for dizziness, syncope, facial asymmetry, speech difficulty, weakness, light-headedness, numbness and headaches.   Hematological:  Does not bruise/bleed easily.   Psychiatric/Behavioral: Negative.  Negative for confusion.    All other systems reviewed and are negative.    Physical Exam     Initial Vitals [05/24/23 0959]   BP Pulse Resp Temp SpO2   127/73 74 17 97.8 °F (36.6 °C) 100 %      MAP       --         Physical Exam    Nursing note and vitals reviewed.  Constitutional: She is not diaphoretic. She is active and cooperative.  Non-toxic appearance. She  does not have a sickly appearance. She does not appear ill. No distress.   HENT:   Head: Normocephalic and atraumatic.   Right Ear: Tympanic membrane normal.   Left Ear: Tympanic membrane normal.   Nose: Nose normal.   Mouth/Throat: Uvula is midline, oropharynx is clear and moist and mucous membranes are normal. No oral lesions. No uvula swelling. No oropharyngeal exudate, posterior oropharyngeal edema or posterior oropharyngeal erythema.   Eyes: Conjunctivae, EOM and lids are normal. Pupils are equal, round, and reactive to light. No scleral icterus.   Neck: Trachea normal and phonation normal. Neck supple. No thyroid mass and no thyromegaly present. No stridor present. No JVD present.   Normal range of motion.   Full passive range of motion without pain.     Cardiovascular:  Normal rate, regular rhythm, normal heart sounds, intact distal pulses and normal pulses.     Exam reveals no gallop, no distant heart sounds and no friction rub.       No murmur heard.  Pulmonary/Chest: Effort normal and breath sounds normal. No accessory muscle usage or stridor. No tachypnea. No respiratory distress. She has no wheezes. She has no rhonchi. She has no rales.   Abdominal: Abdomen is soft. Bowel sounds are normal. She exhibits no distension, no pulsatile midline mass and no mass. There is no abdominal tenderness. There is no rigidity and no guarding.   Musculoskeletal:         General: No tenderness or edema. Normal range of motion.      Right hand: Normal. Normal range of motion. Normal strength. Normal sensation. Normal capillary refill. Normal pulse.      Left hand: Normal. Normal range of motion. Normal strength. Normal sensation. Normal capillary refill. Normal pulse.      Cervical back: Normal, full passive range of motion without pain, normal range of motion and neck supple. No edema, erythema, rigidity or bony tenderness. No spinous process tenderness or muscular tenderness. Normal range of motion.      Thoracic back:  Normal. No bony tenderness. Normal range of motion.      Lumbar back: Normal. No bony tenderness. Normal range of motion.      Right foot: Normal. Normal range of motion and normal capillary refill. No tenderness or bony tenderness. Normal pulse.      Left foot: Normal. Normal range of motion and normal capillary refill. No tenderness or bony tenderness. Normal pulse.      Comments: Pulses are 2+ throughout, cap refill is less than 2 sec throughout, extremities are nontender throughout with full range of motion. There is no spinal tenderness to palpation.     Neurological: She is alert and oriented to person, place, and time. She has normal strength. She displays normal reflexes. No cranial nerve deficit or sensory deficit. Gait normal.   No focal deficits.   Skin: Skin is warm, dry and intact. Capillary refill takes less than 2 seconds. No ecchymosis, no petechiae and no rash noted. No erythema. No pallor.   Psychiatric: She has a normal mood and affect. Her speech is normal and behavior is normal. Judgment and thought content normal. Cognition and memory are normal.       ED Course   Procedures  Labs Reviewed   URINALYSIS, REFLEX TO URINE CULTURE - Abnormal; Notable for the following components:       Result Value    Occult Blood UA 2+ (*)     All other components within normal limits    Narrative:     Specimen Source->Urine   URINALYSIS MICROSCOPIC - Abnormal; Notable for the following components:    Hyaline Casts, UA 3 (*)     All other components within normal limits    Narrative:     Specimen Source->Urine   INFLUENZA A AND B ANTIGEN    Narrative:     Specimen Source->Nasopharyngeal Swab   SARS-COV-2 RNA AMPLIFICATION, QUAL   POCT URINE PREGNANCY          Imaging Results              X-Ray Chest PA And Lateral (Final result)  Result time 05/24/23 11:29:54      Final result by Beni Arellano MD (05/24/23 11:29:54)                   Narrative:    Reason: Cough, nasal congestion    FINDINGS:  PA and lateral chest  compared with 3/15/2022 show normal cardiomediastinal silhouette.    Lungs are clear. Pulmonary vasculature is normal. Bones are normal.    IMPRESSION:  Normal chest.    Electronically signed by:  Beni Arellano MD  5/24/2023 11:29 AM CDT Workstation: [id number]                                     Medications - No data to display  Medical Decision Making:   Clinical Tests:   Lab Tests: Ordered and Reviewed  Radiological Study: Ordered and Reviewed  ED Management:  Patient is well-appearing in no distress.  X-rays negative for pneumonia.  The patient is a smoker.  She will be treated with Zithromax.  Symptomatic supportive care discussed, return precautions discussed in detail and importance of close outpatient evaluation discussed.  Patient voices understanding, feels well and feels ready to go home.                        Clinical Impression:   Final diagnoses:  [R05.9] Cough  [J06.9] Upper respiratory tract infection, unspecified type (Primary)  [J22] Lower resp. tract infection        ED Disposition Condition    Discharge Stable          ED Prescriptions       Medication Sig Dispense Start Date End Date Auth. Provider    azithromycin (ZITHROMAX) 500 MG tablet Take 1 tablet (500 mg total) by mouth once daily. for 5 days 5 tablet 5/24/2023 5/29/2023 Eveline Moctezuma MD    fluticasone propionate (FLONASE) 50 mcg/actuation nasal spray 1 spray (50 mcg total) by Each Nostril route 2 (two) times a day. for 10 days 15 g 5/24/2023 6/3/2023 Eveline Moctezuma MD          Follow-up Information       Follow up With Specialties Details Why Contact Info    Wale James MD Family Medicine Schedule an appointment as soon as possible for a visit in 2 days  [address] [place] [address] [place] [address] [phone]               Eveline Moctezuma MD  05/27/23 6966

## 2023-06-03 ENCOUNTER — PATIENT MESSAGE (OUTPATIENT)
Dept: FAMILY MEDICINE | Facility: CLINIC | Age: 28
End: 2023-06-03
Payer: COMMERCIAL

## 2023-06-19 ENCOUNTER — ANESTHESIA EVENT (OUTPATIENT)
Dept: SURGERY | Facility: HOSPITAL | Age: 28
End: 2023-06-19
Payer: MEDICAID

## 2023-06-19 ENCOUNTER — ANESTHESIA (OUTPATIENT)
Dept: SURGERY | Facility: HOSPITAL | Age: 28
End: 2023-06-19
Payer: MEDICAID

## 2023-06-19 ENCOUNTER — HOSPITAL ENCOUNTER (OUTPATIENT)
Facility: HOSPITAL | Age: 28
Discharge: HOME | End: 2023-06-19
Attending: SURGERY | Admitting: SURGERY
Payer: MEDICAID

## 2023-06-19 VITALS
OXYGEN SATURATION: 100 % | RESPIRATION RATE: 14 BRPM | DIASTOLIC BLOOD PRESSURE: 82 MMHG | BODY MASS INDEX: 31.23 KG/M2 | HEIGHT: 67 IN | TEMPERATURE: 99 F | HEART RATE: 75 BPM | WEIGHT: 199 LBS | SYSTOLIC BLOOD PRESSURE: 116 MMHG

## 2023-06-19 DIAGNOSIS — R11.0 NAUSEA: ICD-10-CM

## 2023-06-19 DIAGNOSIS — R68.81 EARLY SATIETY: ICD-10-CM

## 2023-06-19 LAB — B-HCG UR QL: NEGATIVE

## 2023-06-19 PROCEDURE — 88305 TISSUE EXAM BY PATHOLOGIST: CPT | Mod: 59 | Performed by: STUDENT IN AN ORGANIZED HEALTH CARE EDUCATION/TRAINING PROGRAM

## 2023-06-19 PROCEDURE — D9220A PRA ANESTHESIA: Mod: ANES,,, | Performed by: ANESTHESIOLOGY

## 2023-06-19 PROCEDURE — 27201423 OPTIME MED/SURG SUP & DEVICES STERILE SUPPLY: Performed by: SURGERY

## 2023-06-19 PROCEDURE — 63600175 PHARM REV CODE 636 W HCPCS: Performed by: ANESTHESIOLOGY

## 2023-06-19 PROCEDURE — 88342 CHG IMMUNOCYTOCHEMISTRY: ICD-10-PCS | Mod: 26,,, | Performed by: STUDENT IN AN ORGANIZED HEALTH CARE EDUCATION/TRAINING PROGRAM

## 2023-06-19 PROCEDURE — 88342 IMHCHEM/IMCYTCHM 1ST ANTB: CPT | Performed by: STUDENT IN AN ORGANIZED HEALTH CARE EDUCATION/TRAINING PROGRAM

## 2023-06-19 PROCEDURE — 25000003 PHARM REV CODE 250: Performed by: NURSE ANESTHETIST, CERTIFIED REGISTERED

## 2023-06-19 PROCEDURE — 88342 IMHCHEM/IMCYTCHM 1ST ANTB: CPT | Mod: 26,,, | Performed by: STUDENT IN AN ORGANIZED HEALTH CARE EDUCATION/TRAINING PROGRAM

## 2023-06-19 PROCEDURE — 88305 TISSUE EXAM BY PATHOLOGIST: CPT | Mod: 26,,, | Performed by: STUDENT IN AN ORGANIZED HEALTH CARE EDUCATION/TRAINING PROGRAM

## 2023-06-19 PROCEDURE — 37000008 HC ANESTHESIA 1ST 15 MINUTES: Performed by: SURGERY

## 2023-06-19 PROCEDURE — 81025 URINE PREGNANCY TEST: CPT | Performed by: ANESTHESIOLOGY

## 2023-06-19 PROCEDURE — 63600175 PHARM REV CODE 636 W HCPCS: Performed by: NURSE ANESTHETIST, CERTIFIED REGISTERED

## 2023-06-19 PROCEDURE — 43239 PR EGD, FLEX, W/BIOPSY, SGL/MULTI: ICD-10-PCS | Mod: ,,, | Performed by: SURGERY

## 2023-06-19 PROCEDURE — 25000003 PHARM REV CODE 250: Performed by: ANESTHESIOLOGY

## 2023-06-19 PROCEDURE — D9220A PRA ANESTHESIA: Mod: CRNA,,, | Performed by: NURSE ANESTHETIST, CERTIFIED REGISTERED

## 2023-06-19 PROCEDURE — 43239 EGD BIOPSY SINGLE/MULTIPLE: CPT | Mod: ,,, | Performed by: SURGERY

## 2023-06-19 PROCEDURE — 43239 EGD BIOPSY SINGLE/MULTIPLE: CPT | Performed by: SURGERY

## 2023-06-19 PROCEDURE — 00731 ANES UPR GI NDSC PX NOS: CPT | Performed by: SURGERY

## 2023-06-19 PROCEDURE — D9220A PRA ANESTHESIA: ICD-10-PCS | Mod: CRNA,,, | Performed by: NURSE ANESTHETIST, CERTIFIED REGISTERED

## 2023-06-19 PROCEDURE — 88305 TISSUE EXAM BY PATHOLOGIST: ICD-10-PCS | Mod: 26,,, | Performed by: STUDENT IN AN ORGANIZED HEALTH CARE EDUCATION/TRAINING PROGRAM

## 2023-06-19 PROCEDURE — D9220A PRA ANESTHESIA: ICD-10-PCS | Mod: ANES,,, | Performed by: ANESTHESIOLOGY

## 2023-06-19 RX ORDER — SODIUM CHLORIDE 9 MG/ML
INJECTION, SOLUTION INTRAVENOUS CONTINUOUS
Status: DISCONTINUED | OUTPATIENT
Start: 2023-06-19 | End: 2023-06-19 | Stop reason: HOSPADM

## 2023-06-19 RX ORDER — METOCLOPRAMIDE 5 MG/1
5 TABLET ORAL 4 TIMES DAILY
Qty: 120 TABLET | Refills: 0 | Status: SHIPPED | OUTPATIENT
Start: 2023-06-19 | End: 2023-08-15

## 2023-06-19 RX ORDER — LIDOCAINE HYDROCHLORIDE 10 MG/ML
1 INJECTION, SOLUTION EPIDURAL; INFILTRATION; INTRACAUDAL; PERINEURAL ONCE
Status: COMPLETED | OUTPATIENT
Start: 2023-06-19 | End: 2023-06-19

## 2023-06-19 RX ORDER — PROPOFOL 10 MG/ML
VIAL (ML) INTRAVENOUS
Status: DISCONTINUED | OUTPATIENT
Start: 2023-06-19 | End: 2023-06-19

## 2023-06-19 RX ORDER — SODIUM CHLORIDE, SODIUM LACTATE, POTASSIUM CHLORIDE, CALCIUM CHLORIDE 600; 310; 30; 20 MG/100ML; MG/100ML; MG/100ML; MG/100ML
125 INJECTION, SOLUTION INTRAVENOUS CONTINUOUS
Status: DISCONTINUED | OUTPATIENT
Start: 2023-06-19 | End: 2023-06-19 | Stop reason: HOSPADM

## 2023-06-19 RX ORDER — ONDANSETRON 2 MG/ML
4 INJECTION INTRAMUSCULAR; INTRAVENOUS DAILY PRN
Status: DISCONTINUED | OUTPATIENT
Start: 2023-06-19 | End: 2023-06-19 | Stop reason: HOSPADM

## 2023-06-19 RX ORDER — DIPHENHYDRAMINE HYDROCHLORIDE 50 MG/ML
12.5 INJECTION INTRAMUSCULAR; INTRAVENOUS
Status: DISCONTINUED | OUTPATIENT
Start: 2023-06-19 | End: 2023-06-19 | Stop reason: HOSPADM

## 2023-06-19 RX ORDER — SODIUM CHLORIDE, SODIUM LACTATE, POTASSIUM CHLORIDE, CALCIUM CHLORIDE 600; 310; 30; 20 MG/100ML; MG/100ML; MG/100ML; MG/100ML
INJECTION, SOLUTION INTRAVENOUS CONTINUOUS
Status: DISCONTINUED | OUTPATIENT
Start: 2023-06-19 | End: 2023-06-19 | Stop reason: HOSPADM

## 2023-06-19 RX ADMIN — GLYCOPYRROLATE 0.4 MG: 0.2 INJECTION INTRAMUSCULAR; INTRAVENOUS at 09:06

## 2023-06-19 RX ADMIN — SODIUM CHLORIDE, POTASSIUM CHLORIDE, SODIUM LACTATE AND CALCIUM CHLORIDE: 600; 310; 30; 20 INJECTION, SOLUTION INTRAVENOUS at 08:06

## 2023-06-19 RX ADMIN — PROPOFOL 100 MG: 10 INJECTION, EMULSION INTRAVENOUS at 09:06

## 2023-06-19 RX ADMIN — LIDOCAINE HYDROCHLORIDE 20 MG: 10 INJECTION, SOLUTION EPIDURAL; INFILTRATION; INTRACAUDAL; PERINEURAL at 09:06

## 2023-06-19 NOTE — ANESTHESIA POSTPROCEDURE EVALUATION
Anesthesia Post Evaluation    Patient: Gustavo Perry    Procedure(s) Performed: Procedure(s) (LRB):  ESOPHAGOGASTRODUODENOSCOPY (EGD) (N/A)    Final Anesthesia Type: general      Patient location during evaluation: PACU  Patient participation: Yes- Able to Participate  Level of consciousness: awake and alert  Post-procedure vital signs: reviewed and stable  Pain management: adequate  Airway patency: patent    PONV status at discharge: No PONV  Anesthetic complications: no      Cardiovascular status: blood pressure returned to baseline  Respiratory status: unassisted  Hydration status: euvolemic  Follow-up not needed.          Vitals Value Taken Time   /82 06/19/23 0952   Temp 36.9 °C (98.5 °F) 06/19/23 0915   Pulse 82 06/19/23 0953   Resp 10 06/19/23 0953   SpO2 99 % 06/19/23 0953   Vitals shown include unvalidated device data.      Event Time   Out of Recovery 09:30:00         Pain/Marcos Score: Marcos Score: 10 (6/19/2023 10:00 AM)  Modified Marcos Score: 20 (6/19/2023 10:00 AM)

## 2023-06-19 NOTE — DISCHARGE SUMMARY
Discharge Note        SUMMARY     Admit Date: 6/19/2023    Attending Physician: Eric Huston MD     Discharge Physician: Eric Huston MD    Discharge Date: 6/19/2023 9:10 AM      Hospital Course: Patient tolerated procedure well.     Disposition: Home or Self Care    Patient Instructions:   Current Discharge Medication List        START taking these medications    Details   metoclopramide HCl (REGLAN) 5 MG tablet Take 1 tablet (5 mg total) by mouth 4 (four) times daily.  Qty: 120 tablet, Refills: 0           CONTINUE these medications which have NOT CHANGED    Details   albuterol (PROVENTIL/VENTOLIN HFA) 90 mcg/actuation inhaler Inhale 1-2 puffs into the lungs every 6 (six) hours as needed for Wheezing. Rescue  Qty: 8 g, Refills: 0      pantoprazole (PROTONIX) 40 MG tablet Take 1 tablet (40 mg total) by mouth once daily.  Qty: 30 tablet, Refills: 11    Associated Diagnoses: Gastric ulcer, unspecified chronicity, unspecified whether gastric ulcer hemorrhage or perforation present      traZODone (DESYREL) 50 MG tablet Take  mg by mouth nightly as needed.      amoxicillin-clavulanate 875-125mg (AUGMENTIN) 875-125 mg per tablet Take 1 tablet by mouth 2 (two) times daily.  Qty: 14 tablet, Refills: 0      EScitalopram oxalate (LEXAPRO) 10 MG tablet Take 10 mg by mouth.      guanFACINE (TENEX) 1 MG Tab Take 0.5 mg by mouth 2 (two) times daily.      valACYclovir (VALTREX) 500 MG tablet Take 1 tablet (500 mg total) by mouth 2 (two) times daily.  Qty: 60 tablet, Refills: 11             Discharge Procedure Orders (must include Diet, Follow-up, Activity):   Discharge Procedure Orders (must include Diet, Follow-up, Activity)   Diet general     Call MD for:  temperature >100.4     Call MD for:  persistent nausea and vomiting     Call MD for:  severe uncontrolled pain     Call MD for:  difficulty breathing, headache or visual disturbances     Call MD for:  redness, tenderness, or signs of infection (pain,  swelling, redness, odor or green/yellow discharge around incision site)     Call MD for:  persistent dizziness or light-headedness        Follow Up:  Follow up as scheduled.  Resume routine diet.  Activity as tolerated.    No driving day of procedure.

## 2023-06-19 NOTE — ANESTHESIA PREPROCEDURE EVALUATION
06/19/2023  Gustavo Perry is a 28 y.o., female.      Pre-op Assessment    I have reviewed the Patient Summary Reports.     I have reviewed the Nursing Notes. I have reviewed the NPO Status.   I have reviewed the Medications.     Review of Systems  Social:  Smoker    Hepatic/GI:   GERD    Psych:   Psychiatric History (Bipolar) anxiety depression          Physical Exam    Airway:  Mallampati: II   Mouth Opening: Normal  TM Distance: Normal  Tongue: Normal  Neck ROM: Normal ROM    Chest/Lungs:  Clear to auscultation    Heart:  Rate: Normal  Rhythm: Regular Rhythm        Anesthesia Plan  Type of Anesthesia, risks & benefits discussed:    Anesthesia Type: Gen Natural Airway  Intra-op Monitoring Plan: Standard ASA Monitors  Post Op Pain Control Plan: multimodal analgesia  Induction:  IV  Informed Consent: Informed consent signed with the Patient and all parties understand the risks and agree with anesthesia plan.  All questions answered.   ASA Score: 2  Day of Surgery Review of History & Physical: H&P Update referred to the surgeon/provider.    Ready For Surgery From Anesthesia Perspective.     .

## 2023-06-19 NOTE — [employer] PATIENT INSTRUCTIONS
Discharge Summary/Instructions after an Endoscopic Procedure  Patient Name: Gustavo Perry  Patient MRN: [id number]  Patient YOB: 1995 Monday, June 19, 2023  Eric Huston MD  RESTRICTIONS:  During your procedure today, you received medications for sedation.  These   medications may affect your judgment, balance and coordination.  Therefore,   for 24 hours, you have the following restrictions:   - DO NOT drive a car, operate machinery, make legal/financial decisions,   sign important papers or drink alcohol.    ACTIVITY:  Today: no heavy lifting, straining or running due to procedural   sedation/anesthesia.  The following day: return to full activity including work.  DIET:  Eat and drink normally unless instructed otherwise.     TREATMENT FOR COMMON SIDE EFFECTS:  - Mild abdominal pain, nausea, belching, bloating or excessive gas:  rest,   eat lightly and use a heating pad.  - Sore Throat: treat with throat lozenges and/or gargle with warm salt   water.  - Because air was used during the procedure, expelling large amounts of air   from your rectum or belching is normal.  - If a bowel prep was taken, you may not have a bowel movement for 1-3 days.    This is normal.  SYMPTOMS TO WATCH FOR AND REPORT TO YOUR PHYSICIAN:  1. Abdominal pain or bloating, other than gas cramps.  2. Chest pain.  3. Back pain.  4. Signs of infection such as: chills or fever occurring within 24 hours   after the procedure.  5. Rectal bleeding, which would show as bright red, maroon, or black stools.   (A tablespoon of blood from the rectum is not serious, especially if   hemorrhoids are present.)  6. Vomiting.  7. Weakness or dizziness.  GO DIRECTLY TO THE NEAREST EMERGENCY ROOM IF YOU HAVE ANY OF THE FOLLOWING:      Difficulty breathing              Chills and/or fever over 101 F   Persistent vomiting and/or vomiting blood   Severe abdominal pain   Severe chest pain   Black, tarry stools   Bleeding- more than one  tablespoon   Any other symptom or condition that you feel may need urgent attention  Your doctor recommends these additional instructions:  If any biopsies were taken, your doctors clinic will contact you in 1 to 2   weeks with any results.  - Discharge patient to home (ambulatory).   - Resume previous diet.   - Continue present medications.   - Use metoclopramide 5 mg PO QID; 30 min AC and HS.  For questions, problems or results please call your physician - Eric Huston MD at Work:  [phone].  [facility] EMERGENCY ROOM PHONE NUMBER: [phone]  IF A COMPLICATION OR EMERGENCY SITUATION ARISES AND YOU ARE UNABLE TO REACH   YOUR PHYSICIAN - GO DIRECTLY TO THE EMERGENCY ROOM.  MD Eric Mcmahon MD  6/19/2023 9:12:41 AM  This report has been verified and signed electronically.  Dear patient,  As a result of recent federal legislation (The Federal Cures Act), you may   receive lab or pathology results from your procedure in your [facility]   account before your physician is able to contact you. Your physician or   their representative will relay the results to you with their   recommendations at their soonest availability.  Thank you,  [employer]

## 2023-06-19 NOTE — H&P
[facility] Surgery H&P Note    Subjective:       Patient ID: Gustavo Perry is a 28 y.o. female.    Chief Complaint:  Nausea.  Early satiety.  HPI:  Gustavo Perry is a 28 y.o. female history of bipolar and PTSD presents today as a established patient surgery Clinic with new issue.  Patient states in the last few months she is noticed nausea after eating small amounts of food.  Early satiety.  Decreased food intake.  No vomiting.  Mild epigastric pain.  History of significant gastritis as well as duodenitis and reflux esophagitis found on EGD done back last summer.  She is had her gallbladder out since that time.  Symptoms improved after cholecystectomy.  However these are new symptoms now.  She presents today surgery Clinic for evaluation.    Past Medical History:   Diagnosis Date    Anxiety     Bipolar 1 disorder, depressed     Depression     PTSD (post-traumatic stress disorder)      Past Surgical History:   Procedure Laterality Date     SECTION      ESOPHAGOGASTRODUODENOSCOPY N/A 2022    Procedure: EGD (ESOPHAGOGASTRODUODENOSCOPY);  Surgeon: Andrea Manning MD;  Location: [facility] ENDO;  Service: Endoscopy;  Laterality: N/A;    LAPAROSCOPIC CHOLECYSTECTOMY Bilateral 2022    Procedure: CHOLECYSTECTOMY-LAPAROSCOPIC;  Surgeon: Eric Huston MD;  Location: [facility] OR;  Service: General;  Laterality: Bilateral;    TONSILLECTOMY, ADENOIDECTOMY      TUBAL LIGATION      WISDOM TOOTH EXTRACTION       Family History   Problem Relation Age of Onset    Hypertension Mother     No Known Problems Father     Breast cancer Maternal Grandmother     Cancer Maternal Grandmother     Breast cancer Paternal Grandmother     Cancer Paternal Grandmother     Depression Brother      Social History     Socioeconomic History    Marital status:    Tobacco Use    Smoking status: Every Day     Packs/day: 1.00     Years: 15.00     Pack years: 15.00     Types: Cigarettes    Smokeless tobacco: Never   Substance  "and Sexual Activity    Alcohol use: Not Currently     Comment: socially    Drug use: Never    Sexual activity: Yes     Partners: Male     Birth control/protection: See Surgical Hx, None     Comment: Tubal Ligation -        No current facility-administered medications for this encounter.     Review of patient's allergies indicates:   Allergen Reactions    Sulfa (sulfonamide antibiotics) Hives    Betadine surgical scrub [povidone-iodine] Rash    Hibiclens (isopropyl alcohol) Rash       Review of Systems   Constitutional:  Negative for activity change, appetite change, chills and fever.   HENT:  Negative for congestion, dental problem and ear discharge.    Eyes:  Negative for discharge and itching.   Respiratory:  Negative for apnea, choking and chest tightness.    Cardiovascular:  Negative for chest pain and leg swelling.   Gastrointestinal:  Positive for nausea. Negative for abdominal distention, abdominal pain, anal bleeding, constipation and diarrhea.   Endocrine: Negative for cold intolerance and heat intolerance.   Genitourinary:  Negative for difficulty urinating and dyspareunia.   Musculoskeletal:  Negative for arthralgias and back pain.   Skin:  Negative for color change and pallor.   Neurological:  Negative for dizziness and facial asymmetry.   Hematological:  Negative for adenopathy. Does not bruise/bleed easily.   Psychiatric/Behavioral:  Negative for agitation and behavioral problems.      Objective:      Vitals:    06/19/23 0741 06/19/23 0746   BP: 117/65 117/65   BP Location: Right arm    Patient Position: Sitting    Pulse: (!) 54    Resp: 16    Temp: 98.3 °F (36.8 °C)    TempSrc: Oral    SpO2: 99%    Weight: 90.3 kg (199 lb)    Height: 5' 7" (1.702 m)      Physical Exam  Constitutional:       General: She is not in acute distress.     Appearance: She is well-developed.   HENT:      Head: Normocephalic and atraumatic.   Eyes:      Pupils: Pupils are equal, round, and reactive to light.   Neck:     "  Thyroid: No thyromegaly.   Cardiovascular:      Rate and Rhythm: Normal rate and regular rhythm.   Pulmonary:      Effort: Pulmonary effort is normal.      Breath sounds: Normal breath sounds.   Abdominal:      General: Bowel sounds are normal. There is no distension.      Palpations: Abdomen is soft.      Tenderness: There is no abdominal tenderness.   Musculoskeletal:         General: No deformity. Normal range of motion.      Cervical back: Normal range of motion and neck supple.   Skin:     General: Skin is warm.      Capillary Refill: Capillary refill takes less than 2 seconds.      Findings: No erythema.   Neurological:      Mental Status: She is alert and oriented to person, place, and time.      Cranial Nerves: No cranial nerve deficit.   Psychiatric:         Behavior: Behavior normal.        Assessment:       No diagnosis found.      Plan:   There are no diagnoses linked to this encounter.      Medical Decision Making/Counseling:  Nausea.  Early satiety.  History of significant gastritis duodenitis and reflux esophagitis.  Can not rule out peptic ulcer disease H.pylori infection, etcetera as a cause.  Will recommend EGD further evaluation.  Additionally patient without history of gastric emptying study.  Recommendation be gastric emptying study to go along with EGD further management.  Possible need for promotility agents to include Reglan erythromycin.  Possible need for increased acid suppression, etcetera.  Further management patient will depend upon workup evaluation.  Risk of aspiration perforation bleeding were discussed associated with EGD.    Patient instructed that best way to communicate with my office staff is for patient to get on the [facility] epic patient portal to expedite communication and communication issues that may occur.  Patient was given instructions on how to get on the portal.  I encouraged patient to obtain portal access as well.  Ultimately it is up to the patient to obtain access.   Patient voiced understanding.

## 2023-06-19 NOTE — TRANSFER OF CARE
"Anesthesia Transfer of Care Note    Patient: Gustavo Perry    Procedure(s) Performed: Procedure(s) (LRB):  ESOPHAGOGASTRODUODENOSCOPY (EGD) (N/A)    Patient location: PACU    Anesthesia Type: general    Transport from OR: Transported from OR on room air with adequate spontaneous ventilation    Post pain: adequate analgesia    Post assessment: no apparent anesthetic complications and tolerated procedure well    Post vital signs: stable    Level of consciousness: awake, alert and oriented    Nausea/Vomiting: no nausea/vomiting    Complications: none    Transfer of care protocol was followed      Last vitals:   Visit Vitals  /65   Pulse (!) 54   Temp 36.8 °C (98.3 °F) (Oral)   Resp 16   Ht 5' 7" (1.702 m)   Wt 90.3 kg (199 lb)   LMP 06/19/2023 (Exact Date)   SpO2 99%   Breastfeeding No   BMI 31.17 kg/m²     "

## 2023-06-20 ENCOUNTER — PATIENT MESSAGE (OUTPATIENT)
Dept: SURGERY | Facility: CLINIC | Age: 28
End: 2023-06-20
Payer: COMMERCIAL

## 2023-06-25 LAB
COMMENT: NORMAL
FINAL PATHOLOGIC DIAGNOSIS: NORMAL
GROSS: NORMAL
Lab: NORMAL
MICROSCOPIC EXAM: NORMAL

## 2023-07-06 ENCOUNTER — OFFICE VISIT (OUTPATIENT)
Dept: SURGERY | Facility: CLINIC | Age: 28
End: 2023-07-06
Payer: MEDICAID

## 2023-07-06 VITALS
SYSTOLIC BLOOD PRESSURE: 121 MMHG | DIASTOLIC BLOOD PRESSURE: 79 MMHG | OXYGEN SATURATION: 99 % | BODY MASS INDEX: 29.98 KG/M2 | HEIGHT: 67 IN | WEIGHT: 191 LBS | HEART RATE: 65 BPM

## 2023-07-06 DIAGNOSIS — K30 DELAYED GASTRIC EMPTYING: ICD-10-CM

## 2023-07-06 DIAGNOSIS — K29.70 GASTRITIS, PRESENCE OF BLEEDING UNSPECIFIED, UNSPECIFIED CHRONICITY, UNSPECIFIED GASTRITIS TYPE: Primary | ICD-10-CM

## 2023-07-06 PROCEDURE — 3008F PR BODY MASS INDEX (BMI) DOCUMENTED: ICD-10-PCS | Mod: CPTII,,, | Performed by: SURGERY

## 2023-07-06 PROCEDURE — 99999 PR PBB SHADOW E&M-EST. PATIENT-LVL III: ICD-10-PCS | Mod: PBBFAC,,, | Performed by: SURGERY

## 2023-07-06 PROCEDURE — 1159F PR MEDICATION LIST DOCUMENTED IN MEDICAL RECORD: ICD-10-PCS | Mod: CPTII,,, | Performed by: SURGERY

## 2023-07-06 PROCEDURE — 3008F BODY MASS INDEX DOCD: CPT | Mod: CPTII,,, | Performed by: SURGERY

## 2023-07-06 PROCEDURE — 99213 PR OFFICE/OUTPT VISIT, EST, LEVL III, 20-29 MIN: ICD-10-PCS | Mod: S$PBB,,, | Performed by: SURGERY

## 2023-07-06 PROCEDURE — 99213 OFFICE O/P EST LOW 20 MIN: CPT | Mod: PBBFAC | Performed by: SURGERY

## 2023-07-06 PROCEDURE — 3074F SYST BP LT 130 MM HG: CPT | Mod: CPTII,,, | Performed by: SURGERY

## 2023-07-06 PROCEDURE — 1159F MED LIST DOCD IN RCRD: CPT | Mod: CPTII,,, | Performed by: SURGERY

## 2023-07-06 PROCEDURE — 99999 PR PBB SHADOW E&M-EST. PATIENT-LVL III: CPT | Mod: PBBFAC,,, | Performed by: SURGERY

## 2023-07-06 PROCEDURE — 99213 OFFICE O/P EST LOW 20 MIN: CPT | Mod: S$PBB,,, | Performed by: SURGERY

## 2023-07-06 PROCEDURE — 3078F PR MOST RECENT DIASTOLIC BLOOD PRESSURE < 80 MM HG: ICD-10-PCS | Mod: CPTII,,, | Performed by: SURGERY

## 2023-07-06 PROCEDURE — 3074F PR MOST RECENT SYSTOLIC BLOOD PRESSURE < 130 MM HG: ICD-10-PCS | Mod: CPTII,,, | Performed by: SURGERY

## 2023-07-06 PROCEDURE — 3044F PR MOST RECENT HEMOGLOBIN A1C LEVEL <7.0%: ICD-10-PCS | Mod: CPTII,,, | Performed by: SURGERY

## 2023-07-06 PROCEDURE — 3078F DIAST BP <80 MM HG: CPT | Mod: CPTII,,, | Performed by: SURGERY

## 2023-07-06 PROCEDURE — 3044F HG A1C LEVEL LT 7.0%: CPT | Mod: CPTII,,, | Performed by: SURGERY

## 2023-07-06 NOTE — PROGRESS NOTES
"[facility] General Surgery  Follow-up    Subjective:     Chief Complaint:  Follow-up EGD.    HPI:  Gustavo Perry is a 28 y.o. female presents today for follow-up examination after EGD.  Patient since EGD has done well.  Still abdominal symptomatology with nausea vomiting bloating.  She admits to not taking the Protonix daily, she takes it only when she is reflux, indigestion.  She is tried the Reglan however is difficult to take prior to meal ingestion and to remember.  She presents today for follow-up evaluation.    Review of Systems   Constitutional:  Negative for activity change, appetite change, chills and fever.   HENT:  Negative for congestion, dental problem and ear discharge.    Eyes:  Negative for discharge and itching.   Respiratory:  Negative for apnea, choking and chest tightness.    Cardiovascular:  Negative for chest pain and leg swelling.   Gastrointestinal:  Negative for abdominal distention, abdominal pain, anal bleeding, constipation, diarrhea and nausea.   Endocrine: Negative for cold intolerance and heat intolerance.   Genitourinary:  Negative for difficulty urinating and dyspareunia.   Musculoskeletal:  Negative for arthralgias and back pain.   Skin:  Negative for color change and pallor.   Neurological:  Negative for dizziness and facial asymmetry.   Hematological:  Negative for adenopathy. Does not bruise/bleed easily.   Psychiatric/Behavioral:  Negative for agitation and behavioral problems.      Objective:      Vitals:    07/06/23 0914   BP: 121/79   Pulse: 65   SpO2: 99%   Weight: 86.6 kg (191 lb)   Height: 5' 7" (1.702 m)     Physical Exam  Constitutional:       General: She is not in acute distress.     Appearance: She is well-developed.   HENT:      Head: Normocephalic and atraumatic.   Eyes:      Pupils: Pupils are equal, round, and reactive to light.   Neck:      Thyroid: No thyromegaly.   Cardiovascular:      Rate and Rhythm: Normal rate and regular rhythm.   Pulmonary:      " Effort: Pulmonary effort is normal.      Breath sounds: Normal breath sounds.   Abdominal:      General: Bowel sounds are normal. There is no distension.      Palpations: Abdomen is soft.      Tenderness: There is no abdominal tenderness.   Musculoskeletal:         General: No deformity. Normal range of motion.      Cervical back: Normal range of motion and neck supple.   Skin:     General: Skin is warm.      Capillary Refill: Capillary refill takes less than 2 seconds.      Findings: No erythema.   Neurological:      Mental Status: She is alert and oriented to person, place, and time.      Cranial Nerves: No cranial nerve deficit.   Psychiatric:         Behavior: Behavior normal.        Assessment:       1. Gastritis, presence of bleeding unspecified, unspecified chronicity, unspecified gastritis type    2. Delayed gastric emptying        Plan:   Gastritis, presence of bleeding unspecified, unspecified chronicity, unspecified gastritis type    Delayed gastric emptying        Medical Decision Making/Counseling:  EGD confirms evidence of gastritis no evidence of H.pylori species.  Will recommend patient be compliant with Protonix daily ingestion to improve gastritis which likely will improve patient's symptomatology.      Delayed gastric emptying.  Have recommended patient maintain compliance with taking Reglan 30 minutes prior to meal ingestion.  Follow-up surgery clinic 1 month for repeat evaluation.    Follow up:  1 month    Patient instructed that best way to communicate with my office staff is for patient to get on the [facility] epic patient portal to expedite communication and communication issues that may occur.  Patient was given instructions on how to get on the portal.  I encouraged patient to obtain portal access as well.  Ultimately it is up to the patient to obtain access.  Patient voiced understanding.

## 2023-07-10 ENCOUNTER — PATIENT MESSAGE (OUTPATIENT)
Dept: SURGERY | Facility: CLINIC | Age: 28
End: 2023-07-10
Payer: COMMERCIAL

## 2023-07-19 ENCOUNTER — PATIENT MESSAGE (OUTPATIENT)
Dept: SURGERY | Facility: CLINIC | Age: 28
End: 2023-07-19
Payer: COMMERCIAL

## 2023-07-20 DIAGNOSIS — K29.70 GASTRITIS, PRESENCE OF BLEEDING UNSPECIFIED, UNSPECIFIED CHRONICITY, UNSPECIFIED GASTRITIS TYPE: Primary | ICD-10-CM

## 2023-07-20 RX ORDER — ERYTHROMYCIN 500 MG/1
500 TABLET, COATED ORAL
Qty: 90 TABLET | Refills: 0 | Status: SHIPPED | OUTPATIENT
Start: 2023-07-20 | End: 2023-08-19

## 2023-08-01 ENCOUNTER — PATIENT MESSAGE (OUTPATIENT)
Dept: SURGERY | Facility: CLINIC | Age: 28
End: 2023-08-01
Payer: COMMERCIAL

## 2023-08-15 ENCOUNTER — LAB VISIT (OUTPATIENT)
Dept: LAB | Facility: HOSPITAL | Age: 28
End: 2023-08-15
Attending: FAMILY MEDICINE
Payer: MEDICAID

## 2023-08-15 ENCOUNTER — OFFICE VISIT (OUTPATIENT)
Dept: FAMILY MEDICINE | Facility: CLINIC | Age: 28
End: 2023-08-15
Payer: MEDICAID

## 2023-08-15 VITALS
DIASTOLIC BLOOD PRESSURE: 70 MMHG | HEART RATE: 60 BPM | OXYGEN SATURATION: 99 % | BODY MASS INDEX: 31.11 KG/M2 | WEIGHT: 198.19 LBS | SYSTOLIC BLOOD PRESSURE: 120 MMHG | HEIGHT: 67 IN

## 2023-08-15 DIAGNOSIS — R63.1 POLYDIPSIA: ICD-10-CM

## 2023-08-15 DIAGNOSIS — D50.9 MICROCYTIC HYPOCHROMIC ANEMIA: ICD-10-CM

## 2023-08-15 DIAGNOSIS — G47.00 INSOMNIA, UNSPECIFIED TYPE: ICD-10-CM

## 2023-08-15 DIAGNOSIS — R63.1 POLYDIPSIA: Primary | ICD-10-CM

## 2023-08-15 LAB
ANION GAP SERPL CALC-SCNC: 9 MMOL/L (ref 8–16)
BUN SERPL-MCNC: 10 MG/DL (ref 6–20)
CALCIUM SERPL-MCNC: 9.4 MG/DL (ref 8.7–10.5)
CHLORIDE SERPL-SCNC: 107 MMOL/L (ref 95–110)
CO2 SERPL-SCNC: 22 MMOL/L (ref 23–29)
CREAT SERPL-MCNC: 0.9 MG/DL (ref 0.5–1.4)
EST. GFR  (NO RACE VARIABLE): >60 ML/MIN/1.73 M^2
ESTIMATED AVG GLUCOSE: 100 MG/DL (ref 68–131)
FERRITIN SERPL-MCNC: 5 NG/ML (ref 20–300)
GLUCOSE SERPL-MCNC: 86 MG/DL (ref 70–110)
HBA1C MFR BLD: 5.1 % (ref 4–5.6)
IRON SERPL-MCNC: 46 UG/DL (ref 30–160)
POTASSIUM SERPL-SCNC: 3.5 MMOL/L (ref 3.5–5.1)
SATURATED IRON: 8 % (ref 20–50)
SODIUM SERPL-SCNC: 138 MMOL/L (ref 136–145)
TOTAL IRON BINDING CAPACITY: 556 UG/DL (ref 250–450)
TRANSFERRIN SERPL-MCNC: 376 MG/DL (ref 200–375)

## 2023-08-15 PROCEDURE — 3078F PR MOST RECENT DIASTOLIC BLOOD PRESSURE < 80 MM HG: ICD-10-PCS | Mod: CPTII,,, | Performed by: FAMILY MEDICINE

## 2023-08-15 PROCEDURE — 3074F SYST BP LT 130 MM HG: CPT | Mod: CPTII,,, | Performed by: FAMILY MEDICINE

## 2023-08-15 PROCEDURE — 1159F PR MEDICATION LIST DOCUMENTED IN MEDICAL RECORD: ICD-10-PCS | Mod: CPTII,,, | Performed by: FAMILY MEDICINE

## 2023-08-15 PROCEDURE — 3074F PR MOST RECENT SYSTOLIC BLOOD PRESSURE < 130 MM HG: ICD-10-PCS | Mod: CPTII,,, | Performed by: FAMILY MEDICINE

## 2023-08-15 PROCEDURE — 3044F PR MOST RECENT HEMOGLOBIN A1C LEVEL <7.0%: ICD-10-PCS | Mod: CPTII,,, | Performed by: FAMILY MEDICINE

## 2023-08-15 PROCEDURE — 3008F BODY MASS INDEX DOCD: CPT | Mod: CPTII,,, | Performed by: FAMILY MEDICINE

## 2023-08-15 PROCEDURE — 1159F MED LIST DOCD IN RCRD: CPT | Mod: CPTII,,, | Performed by: FAMILY MEDICINE

## 2023-08-15 PROCEDURE — 99214 OFFICE O/P EST MOD 30 MIN: CPT | Mod: S$PBB,,, | Performed by: FAMILY MEDICINE

## 2023-08-15 PROCEDURE — 99214 PR OFFICE/OUTPT VISIT, EST, LEVL IV, 30-39 MIN: ICD-10-PCS | Mod: S$PBB,,, | Performed by: FAMILY MEDICINE

## 2023-08-15 PROCEDURE — 36415 COLL VENOUS BLD VENIPUNCTURE: CPT | Performed by: FAMILY MEDICINE

## 2023-08-15 PROCEDURE — 83036 HEMOGLOBIN GLYCOSYLATED A1C: CPT | Performed by: FAMILY MEDICINE

## 2023-08-15 PROCEDURE — 84466 ASSAY OF TRANSFERRIN: CPT | Performed by: FAMILY MEDICINE

## 2023-08-15 PROCEDURE — 3078F DIAST BP <80 MM HG: CPT | Mod: CPTII,,, | Performed by: FAMILY MEDICINE

## 2023-08-15 PROCEDURE — 83540 ASSAY OF IRON: CPT | Performed by: FAMILY MEDICINE

## 2023-08-15 PROCEDURE — 99213 OFFICE O/P EST LOW 20 MIN: CPT | Mod: PBBFAC | Performed by: FAMILY MEDICINE

## 2023-08-15 PROCEDURE — 99999 PR PBB SHADOW E&M-EST. PATIENT-LVL III: ICD-10-PCS | Mod: PBBFAC,,, | Performed by: FAMILY MEDICINE

## 2023-08-15 PROCEDURE — 80048 BASIC METABOLIC PNL TOTAL CA: CPT | Performed by: FAMILY MEDICINE

## 2023-08-15 PROCEDURE — 3008F PR BODY MASS INDEX (BMI) DOCUMENTED: ICD-10-PCS | Mod: CPTII,,, | Performed by: FAMILY MEDICINE

## 2023-08-15 PROCEDURE — 3044F HG A1C LEVEL LT 7.0%: CPT | Mod: CPTII,,, | Performed by: FAMILY MEDICINE

## 2023-08-15 PROCEDURE — 82728 ASSAY OF FERRITIN: CPT | Performed by: FAMILY MEDICINE

## 2023-08-15 PROCEDURE — 99999 PR PBB SHADOW E&M-EST. PATIENT-LVL III: CPT | Mod: PBBFAC,,, | Performed by: FAMILY MEDICINE

## 2023-08-15 RX ORDER — DOXEPIN HYDROCHLORIDE 10 MG/1
10 CAPSULE ORAL NIGHTLY
Qty: 30 CAPSULE | Refills: 11 | Status: SHIPPED | OUTPATIENT
Start: 2023-08-15 | End: 2024-03-25

## 2023-08-15 NOTE — PROGRESS NOTES
[facility] - Clinic Note    Subjective      Ms. Perry is a 28 y.o. female who presents to clinic with complaints of increase thirst.     Reports increase thirst and hunger for the past few weeks.   Admits to dry mouth.   Does smoke marijuana.     Having trouble sleeping. On trazodone with no relief. Has tried melatonin previously.     [facility] Gustavo has a past medical history of Anxiety, Bipolar 1 disorder, depressed, Depression, and PTSD (post-traumatic stress disorder).   PSXH Gustavo has a past surgical history that includes TONSILLECTOMY, ADENOIDECTOMY; [place] tooth extraction; Tubal ligation;  section; Esophagogastroduodenoscopy (N/A, 2022); Laparoscopic cholecystectomy (Bilateral, 2022); and Esophagogastroduodenoscopy (N/A, 2023).    Gustavo's family history includes Breast cancer in her maternal grandmother and paternal grandmother; Cancer in her maternal grandmother and paternal grandmother; Depression in her brother; Hypertension in her mother; No Known Problems in her father.    Gustavo reports that she has been smoking cigarettes. She has a 15.0 pack-year smoking history. She has never used smokeless tobacco. She reports that she does not currently use alcohol. She reports that she does not use drugs.   ELROY Chen is allergic to sulfa (sulfonamide antibiotics), betadine surgical scrub [povidone-iodine], and hibiclens (isopropyl alcohol).   [facility] Gustavo has a current medication list which includes the following prescription(s): albuterol, pantoprazole, valacyclovir, and doxepin.     Review of Systems   Constitutional:  Negative for activity change, appetite change, chills, fatigue and fever.   Eyes:  Negative for visual disturbance.   Respiratory:  Negative for cough and shortness of breath.    Cardiovascular:  Negative for chest pain, palpitations and leg swelling.   Gastrointestinal:  Negative for abdominal pain, nausea and vomiting.   Endocrine: Positive for polydipsia, polyphagia and  "polyuria.   Genitourinary:  Positive for frequency. Negative for dysuria.   Skin:  Negative for wound.   Neurological:  Negative for dizziness and headaches.   Psychiatric/Behavioral:  Negative for confusion.      Objective     /70 (BP Location: Left arm, Patient Position: Sitting, BP Method: Medium (Manual))   Pulse 60   Ht 5' 7" (1.702 m)   Wt 89.9 kg (198 lb 3.2 oz)   SpO2 99%   BMI 31.04 kg/m²     Physical Exam   Constitutional: normal appearance. She appears well-developed, well-nourished and obese.  Non-toxic appearance. No distress. She does not appear ill.   HENT:   Head: Normocephalic and atraumatic.   Eyes: Right eye exhibits no discharge. Left eye exhibits no discharge.   Cardiovascular: Normal rate, regular rhythm, normal heart sounds and normal pulses. Exam reveals no gallop and no friction rub.   No murmur heard.Pulmonary:      Effort: Pulmonary effort is normal. No respiratory distress.      Breath sounds: Normal breath sounds. No wheezing, rhonchi or rales.     Abdominal: Normal appearance.   Musculoskeletal:      Cervical back: Neck supple.   Lymphadenopathy:     She has no cervical adenopathy.   Neurological: She is alert.   Skin: Skin is warm and dry. Capillary refill takes less than 2 seconds. She is not diaphoretic.   Psychiatric: Her behavior is normal. Mood, judgment and thought content normal.   Vitals reviewed.     Assessment/Plan     Gustavo was seen today for blood work .    Diagnoses and all orders for this visit:    Polydipsia  -     Hemoglobin A1c; Future  -     Basic metabolic panel; Future  -     Urinalysis, Reflex to Urine Culture; Future    Insomnia, unspecified type  -     doxepin (SINEQUAN) 10 MG capsule; Take 1 capsule (10 mg total) by mouth every evening.          Future Appointments   Date Time Provider Department Center   9/12/2023  9:45 AM Cate Ledesma WHNP [facility]   9/19/2023  8:40 AM Wale James MD [facility] Jack "       Wale James MD  Family Medicine  [facility]

## 2023-09-06 ENCOUNTER — PATIENT MESSAGE (OUTPATIENT)
Dept: FAMILY MEDICINE | Facility: CLINIC | Age: 28
End: 2023-09-06
Payer: COMMERCIAL

## 2023-09-12 PROBLEM — Z01.419 WELL WOMAN EXAM WITH ROUTINE GYNECOLOGICAL EXAM: Status: ACTIVE | Noted: 2020-12-22

## 2023-09-12 PROBLEM — N89.8 VAGINAL DISCHARGE: Status: ACTIVE | Noted: 2023-09-12

## 2023-09-12 PROBLEM — N76.0 BACTERIAL VAGINOSIS: Status: ACTIVE | Noted: 2023-09-12

## 2023-09-12 PROBLEM — B96.89 BACTERIAL VAGINOSIS: Status: ACTIVE | Noted: 2023-09-12

## 2023-09-12 PROBLEM — D50.0 IRON DEFICIENCY ANEMIA DUE TO CHRONIC BLOOD LOSS: Status: ACTIVE | Noted: 2022-06-13

## 2023-09-12 PROBLEM — N94.10 DYSPAREUNIA IN FEMALE: Status: ACTIVE | Noted: 2023-09-12

## 2023-09-12 PROBLEM — N92.0 MENORRHAGIA WITH REGULAR CYCLE: Status: ACTIVE | Noted: 2023-09-12

## 2023-09-12 PROBLEM — F32.0 CURRENT MILD EPISODE OF MAJOR DEPRESSIVE DISORDER: Status: ACTIVE | Noted: 2023-09-12

## 2023-09-12 PROBLEM — N94.6 DYSMENORRHEA: Status: ACTIVE | Noted: 2023-09-12

## 2023-09-19 ENCOUNTER — OFFICE VISIT (OUTPATIENT)
Dept: FAMILY MEDICINE | Facility: CLINIC | Age: 28
End: 2023-09-19
Payer: COMMERCIAL

## 2023-09-19 VITALS
SYSTOLIC BLOOD PRESSURE: 124 MMHG | HEIGHT: 67 IN | HEART RATE: 71 BPM | OXYGEN SATURATION: 99 % | DIASTOLIC BLOOD PRESSURE: 72 MMHG | WEIGHT: 196.63 LBS | BODY MASS INDEX: 30.86 KG/M2

## 2023-09-19 DIAGNOSIS — D50.9 IRON DEFICIENCY ANEMIA, UNSPECIFIED IRON DEFICIENCY ANEMIA TYPE: ICD-10-CM

## 2023-09-19 DIAGNOSIS — G47.00 INSOMNIA, UNSPECIFIED TYPE: Primary | ICD-10-CM

## 2023-09-19 DIAGNOSIS — R42 DIZZINESS: ICD-10-CM

## 2023-09-19 DIAGNOSIS — H61.23 BILATERAL IMPACTED CERUMEN: ICD-10-CM

## 2023-09-19 DIAGNOSIS — R00.2 PALPITATIONS: ICD-10-CM

## 2023-09-19 PROCEDURE — 3044F HG A1C LEVEL LT 7.0%: CPT | Mod: S$GLB,,, | Performed by: FAMILY MEDICINE

## 2023-09-19 PROCEDURE — 3008F BODY MASS INDEX DOCD: CPT | Mod: S$GLB,,, | Performed by: FAMILY MEDICINE

## 2023-09-19 PROCEDURE — 3044F PR MOST RECENT HEMOGLOBIN A1C LEVEL <7.0%: ICD-10-PCS | Mod: S$GLB,,, | Performed by: FAMILY MEDICINE

## 2023-09-19 PROCEDURE — 3074F SYST BP LT 130 MM HG: CPT | Mod: S$GLB,,, | Performed by: FAMILY MEDICINE

## 2023-09-19 PROCEDURE — 3008F PR BODY MASS INDEX (BMI) DOCUMENTED: ICD-10-PCS | Mod: S$GLB,,, | Performed by: FAMILY MEDICINE

## 2023-09-19 PROCEDURE — 3078F PR MOST RECENT DIASTOLIC BLOOD PRESSURE < 80 MM HG: ICD-10-PCS | Mod: S$GLB,,, | Performed by: FAMILY MEDICINE

## 2023-09-19 PROCEDURE — 99999 PR PBB SHADOW E&M-EST. PATIENT-LVL III: CPT | Mod: PBBFAC,,, | Performed by: FAMILY MEDICINE

## 2023-09-19 PROCEDURE — 3078F DIAST BP <80 MM HG: CPT | Mod: S$GLB,,, | Performed by: FAMILY MEDICINE

## 2023-09-19 PROCEDURE — 99214 OFFICE O/P EST MOD 30 MIN: CPT | Mod: S$GLB,,, | Performed by: FAMILY MEDICINE

## 2023-09-19 PROCEDURE — 99214 PR OFFICE/OUTPT VISIT, EST, LEVL IV, 30-39 MIN: ICD-10-PCS | Mod: S$GLB,,, | Performed by: FAMILY MEDICINE

## 2023-09-19 PROCEDURE — 3074F PR MOST RECENT SYSTOLIC BLOOD PRESSURE < 130 MM HG: ICD-10-PCS | Mod: S$GLB,,, | Performed by: FAMILY MEDICINE

## 2023-09-19 PROCEDURE — 99999 PR PBB SHADOW E&M-EST. PATIENT-LVL III: ICD-10-PCS | Mod: PBBFAC,,, | Performed by: FAMILY MEDICINE

## 2023-09-19 NOTE — PROGRESS NOTES
[facility] - Clinic Note    Subjective      Ms. Perry is a 28 y.o. female who presents to clinic for a follow up.     Started on doxepin last month for insomnia.  Reports an improvement  Denies adverse effects.   Tried trazodone and melatonin in the past with no relief.     Reports dizziness with head position changes.   Feels like she is going to faint, blurry vision, and reports that her body will feel heavy at that time.     Concerned about her weight  Has been on adipex in the past    Reports palpitations  Present for many years  No triggers or aggravating factors  Goes away on its own  Feels like heart skips a beat. Has not checked her heart rate when this occurs.     [facility] Gustavo has a past medical history of Anxiety, Bipolar 1 disorder, depressed, Depression, Herpes simplex virus (HSV) infection, and PTSD (post-traumatic stress disorder).   PSXH Gustavo has a past surgical history that includes TONSILLECTOMY, ADENOIDECTOMY; [place] tooth extraction; Tubal ligation;  section; Esophagogastroduodenoscopy (N/A, 2022); Laparoscopic cholecystectomy (Bilateral, 2022); Esophagogastroduodenoscopy (N/A, 2023); and Cholecystectomy.    Gustavo's family history includes Breast cancer in her maternal grandmother and paternal grandmother; Cancer in her maternal grandmother and paternal grandmother; Depression in her brother; Diabetes in her maternal uncle; Hypertension in her mother; No Known Problems in her father.    Gustavo reports that she has been smoking cigarettes. She has a 15.0 pack-year smoking history. She has never used smokeless tobacco. She reports that she does not currently use alcohol. She reports that she does not use drugs.   ELROY Chen is allergic to sulfa (sulfonamide antibiotics), betadine surgical scrub [povidone-iodine], and hibiclens (isopropyl alcohol).   DIANNE Chen has a current medication list which includes the following prescription(s): albuterol, doxepin, metronidazole,  "pantoprazole, and valacyclovir.     Review of Systems   Constitutional:  Negative for activity change, appetite change, chills, fatigue and fever.   Eyes:  Negative for visual disturbance.   Respiratory:  Negative for cough and shortness of breath.    Cardiovascular:  Positive for palpitations. Negative for chest pain and leg swelling.   Gastrointestinal:  Negative for abdominal pain, nausea and vomiting.   Skin:  Negative for wound.   Neurological:  Negative for dizziness and headaches.   Psychiatric/Behavioral:  Negative for confusion.      Objective     /72 (BP Location: Right arm, Patient Position: Sitting, BP Method: Medium (Manual))   Pulse 71   Ht 5' 7" (1.702 m)   Wt 89.2 kg (196 lb 9.6 oz)   LMP 08/20/2023 (Exact Date)   SpO2 99%   BMI 30.79 kg/m²     Physical Exam   Constitutional: normal appearance. She appears well-developed, well-nourished and obese.  Non-toxic appearance. No distress. She does not appear ill.   HENT:   Head: Normocephalic and atraumatic.   Eyes: Right eye exhibits no discharge. Left eye exhibits no discharge.   Cardiovascular: Normal rate, regular rhythm, normal heart sounds and normal pulses. Exam reveals no gallop and no friction rub.   No murmur heard.Pulmonary:      Effort: Pulmonary effort is normal. No respiratory distress.      Breath sounds: Normal breath sounds. No wheezing, rhonchi or rales.     Abdominal: Normal appearance.   Musculoskeletal:      Cervical back: Neck supple.   Lymphadenopathy:     She has no cervical adenopathy.   Neurological: She is alert.   Skin: Skin is warm and dry. Capillary refill takes less than 2 seconds. She is not diaphoretic.   Psychiatric: Her behavior is normal. Mood, judgment and thought content normal.   Vitals reviewed.     Assessment/Plan     Gustavo was seen today for follow-up.    Diagnoses and all orders for this visit:    Insomnia, unspecified type  -improved with doxepin    Dizziness  -     Ambulatory referral/consult to ENT; " Future    Bilateral impacted cerumen  -     Ambulatory referral/consult to ENT; Future    Palpitations  -     Holter monitor - 48 hour; Future    Iron deficiency anemia, unspecified iron deficiency anemia type  -recommended daily OTC ferrous sulfate.          Future Appointments   Date Time Provider Department Center   9/22/2023  9:45 AM Migel Marcsu MD [facility] E[facility]   10/5/2023  3:30 PM Johnie Ventura MD [facility]       Wale James MD  Family Medicine  [facility]

## 2023-09-28 ENCOUNTER — HOSPITAL ENCOUNTER (OUTPATIENT)
Dept: CARDIOLOGY | Facility: HOSPITAL | Age: 28
Discharge: HOME | End: 2023-09-28
Attending: FAMILY MEDICINE
Payer: COMMERCIAL

## 2023-09-28 DIAGNOSIS — R00.2 PALPITATIONS: ICD-10-CM

## 2023-09-28 PROCEDURE — 93225 XTRNL ECG REC<48 HRS REC: CPT

## 2023-09-28 PROCEDURE — 93227 XTRNL ECG REC<48 HR R&I: CPT | Mod: ,,, | Performed by: INTERNAL MEDICINE

## 2023-09-28 PROCEDURE — 93227 HOLTER MONITOR - 48 HOUR (CUPID ONLY): ICD-10-PCS | Mod: ,,, | Performed by: INTERNAL MEDICINE

## 2023-10-03 LAB
OHS CV EVENT MONITOR DAY: 0
OHS CV HOLTER LENGTH DECIMAL HOURS: 47.98
OHS CV HOLTER LENGTH HOURS: 47
OHS CV HOLTER LENGTH MINUTES: 59
OHS CV HOLTER SINUS AVERAGE HR: 77
OHS CV HOLTER SINUS MAX HR: 162
OHS CV HOLTER SINUS MIN HR: 45

## 2023-10-09 ENCOUNTER — PATIENT MESSAGE (OUTPATIENT)
Dept: FAMILY MEDICINE | Facility: CLINIC | Age: 28
End: 2023-10-09
Payer: COMMERCIAL

## 2023-10-09 DIAGNOSIS — R00.2 PALPITATIONS: Primary | ICD-10-CM

## 2023-10-09 DIAGNOSIS — I49.1 PAC (PREMATURE ATRIAL CONTRACTION): ICD-10-CM

## 2023-10-19 ENCOUNTER — PATIENT MESSAGE (OUTPATIENT)
Dept: FAMILY MEDICINE | Facility: CLINIC | Age: 28
End: 2023-10-19
Payer: COMMERCIAL

## 2023-11-27 ENCOUNTER — OFFICE VISIT (OUTPATIENT)
Dept: CARDIOLOGY | Facility: CLINIC | Age: 28
End: 2023-11-27
Payer: COMMERCIAL

## 2023-11-27 VITALS
HEIGHT: 67 IN | BODY MASS INDEX: 30.08 KG/M2 | WEIGHT: 191.63 LBS | OXYGEN SATURATION: 99 % | HEART RATE: 94 BPM | SYSTOLIC BLOOD PRESSURE: 140 MMHG | DIASTOLIC BLOOD PRESSURE: 88 MMHG

## 2023-11-27 DIAGNOSIS — R42 ORTHOSTATIC LIGHTHEADEDNESS: ICD-10-CM

## 2023-11-27 DIAGNOSIS — I49.1 PAC (PREMATURE ATRIAL CONTRACTION): ICD-10-CM

## 2023-11-27 DIAGNOSIS — F09 COGNITIVE DYSFUNCTION: ICD-10-CM

## 2023-11-27 DIAGNOSIS — D50.0 IRON DEFICIENCY ANEMIA DUE TO CHRONIC BLOOD LOSS: ICD-10-CM

## 2023-11-27 DIAGNOSIS — E65 OBESE ABDOMEN: ICD-10-CM

## 2023-11-27 DIAGNOSIS — R00.2 PALPITATIONS: Primary | ICD-10-CM

## 2023-11-27 DIAGNOSIS — E78.00 HYPERCHOLESTEROLEMIA: ICD-10-CM

## 2023-11-27 DIAGNOSIS — F12.90 MARIJUANA SMOKER: ICD-10-CM

## 2023-11-27 DIAGNOSIS — F17.200 SMOKER: ICD-10-CM

## 2023-11-27 DIAGNOSIS — Z78.9 EXCESSIVE CAFFEINE INTAKE: ICD-10-CM

## 2023-11-27 DIAGNOSIS — Z76.89 ENCOUNTER TO ESTABLISH CARE: ICD-10-CM

## 2023-11-27 DIAGNOSIS — G47.19 EXCESSIVE DAYTIME SLEEPINESS: ICD-10-CM

## 2023-11-27 DIAGNOSIS — R06.83 SNORING: ICD-10-CM

## 2023-11-27 DIAGNOSIS — F41.1 GAD (GENERALIZED ANXIETY DISORDER): ICD-10-CM

## 2023-11-27 DIAGNOSIS — R07.2 PRECORDIAL CHEST PAIN: ICD-10-CM

## 2023-11-27 DIAGNOSIS — R06.81 WITNESSED EPISODE OF APNEA: ICD-10-CM

## 2023-11-27 PROCEDURE — 99999 PR PBB SHADOW E&M-EST. PATIENT-LVL V: ICD-10-PCS | Mod: PBBFAC,,, | Performed by: INTERNAL MEDICINE

## 2023-11-27 PROCEDURE — 3079F PR MOST RECENT DIASTOLIC BLOOD PRESSURE 80-89 MM HG: ICD-10-PCS | Mod: S$GLB,,, | Performed by: INTERNAL MEDICINE

## 2023-11-27 PROCEDURE — 1159F PR MEDICATION LIST DOCUMENTED IN MEDICAL RECORD: ICD-10-PCS | Mod: S$GLB,,, | Performed by: INTERNAL MEDICINE

## 2023-11-27 PROCEDURE — 3044F HG A1C LEVEL LT 7.0%: CPT | Mod: S$GLB,,, | Performed by: INTERNAL MEDICINE

## 2023-11-27 PROCEDURE — 99999 PR PBB SHADOW E&M-EST. PATIENT-LVL V: CPT | Mod: PBBFAC,,, | Performed by: INTERNAL MEDICINE

## 2023-11-27 PROCEDURE — 99205 OFFICE O/P NEW HI 60 MIN: CPT | Mod: 25,S$GLB,, | Performed by: INTERNAL MEDICINE

## 2023-11-27 PROCEDURE — 1160F PR REVIEW ALL MEDS BY PRESCRIBER/CLIN PHARMACIST DOCUMENTED: ICD-10-PCS | Mod: S$GLB,,, | Performed by: INTERNAL MEDICINE

## 2023-11-27 PROCEDURE — 3077F SYST BP >= 140 MM HG: CPT | Mod: S$GLB,,, | Performed by: INTERNAL MEDICINE

## 2023-11-27 PROCEDURE — 3008F PR BODY MASS INDEX (BMI) DOCUMENTED: ICD-10-PCS | Mod: S$GLB,,, | Performed by: INTERNAL MEDICINE

## 2023-11-27 PROCEDURE — 3008F BODY MASS INDEX DOCD: CPT | Mod: S$GLB,,, | Performed by: INTERNAL MEDICINE

## 2023-11-27 PROCEDURE — 3077F PR MOST RECENT SYSTOLIC BLOOD PRESSURE >= 140 MM HG: ICD-10-PCS | Mod: S$GLB,,, | Performed by: INTERNAL MEDICINE

## 2023-11-27 PROCEDURE — 3044F PR MOST RECENT HEMOGLOBIN A1C LEVEL <7.0%: ICD-10-PCS | Mod: S$GLB,,, | Performed by: INTERNAL MEDICINE

## 2023-11-27 PROCEDURE — 1159F MED LIST DOCD IN RCRD: CPT | Mod: S$GLB,,, | Performed by: INTERNAL MEDICINE

## 2023-11-27 PROCEDURE — 93000 EKG 12-LEAD: ICD-10-PCS | Mod: S$GLB,,, | Performed by: INTERNAL MEDICINE

## 2023-11-27 PROCEDURE — 99205 PR OFFICE/OUTPT VISIT, NEW, LEVL V, 60-74 MIN: ICD-10-PCS | Mod: 25,S$GLB,, | Performed by: INTERNAL MEDICINE

## 2023-11-27 PROCEDURE — 93000 ELECTROCARDIOGRAM COMPLETE: CPT | Mod: S$GLB,,, | Performed by: INTERNAL MEDICINE

## 2023-11-27 PROCEDURE — 1160F RVW MEDS BY RX/DR IN RCRD: CPT | Mod: S$GLB,,, | Performed by: INTERNAL MEDICINE

## 2023-11-27 PROCEDURE — 3079F DIAST BP 80-89 MM HG: CPT | Mod: S$GLB,,, | Performed by: INTERNAL MEDICINE

## 2023-11-27 NOTE — PROGRESS NOTES
Subjective:    Patient ID:  Gustavo Perry is a 28 y.o. female who presents for evaluation of Consult (Heart)  PCP and referred by Wale James MD   No prior cardiologist  Lives with , Chan, non-smoker  Stay at home mom, 4 children, usual stress.    Patient is a new patient to me.     Health literacy: medium  Vaccinations: decline, had COVID 3/2022, no residual problem    Activities: no time for exercise, do housework and yard work, no problem, not limited.  Nicotine: started age 16, 1 ppd   Alcohol: none  Illicit drugs: marijuana started age 16, uses socially, twice monthly  Cardiac symptoms: palpitations since teens and occasional CP, dizziness with standing  Home BP: do not check  Medication compliance: no CV meds  Diet: regular, uses  salt  Caffeine: 6 cpd, have sleep problem  Labs:   Lab Results   Component Value Date    TSH 0.612 02/16/2023        Lab Results   Component Value Date    HGBA1C 5.1 08/15/2023       Lab Results   Component Value Date    WBC 7.7 09/12/2023    HGB 11.3 09/12/2023    HCT 36.2 09/12/2023    MCV 76 (L) 09/12/2023     09/12/2023       CMP  Sodium   Date Value Ref Range Status   08/15/2023 138 136 - 145 mmol/L Final   02/16/2023 138 136 - 145 mmol/L Final   05/16/2022 140 136 - 145 mmol/L Final     Potassium   Date Value Ref Range Status   08/15/2023 3.5 3.5 - 5.1 mmol/L Final   02/16/2023 3.8 3.5 - 5.1 mmol/L Final   05/16/2022 3.7 3.5 - 5.1 mmol/L Final     Chloride   Date Value Ref Range Status   08/15/2023 107 95 - 110 mmol/L Final   02/16/2023 109 95 - 110 mmol/L Final   05/16/2022 108 95 - 110 mmol/L Final     CO2   Date Value Ref Range Status   08/15/2023 22 (L) 23 - 29 mmol/L Final   02/16/2023 24 23 - 29 mmol/L Final   05/16/2022 25 23 - 29 mmol/L Final     Glucose   Date Value Ref Range Status   08/15/2023 86 70 - 110 mg/dL Final   02/16/2023 85 70 - 110 mg/dL Final   05/16/2022 85 70 - 110 mg/dL Final     BUN   Date Value Ref Range Status   08/15/2023  10 6 - 20 mg/dL Final   02/16/2023 11 6 - 20 mg/dL Final   05/16/2022 9 6 - 20 mg/dL Final     Creatinine   Date Value Ref Range Status   08/15/2023 0.9 0.5 - 1.4 mg/dL Final   02/16/2023 0.9 0.5 - 1.4 mg/dL Final   05/16/2022 0.9 0.5 - 1.4 mg/dL Final     Calcium   Date Value Ref Range Status   08/15/2023 9.4 8.7 - 10.5 mg/dL Final   02/16/2023 9.4 8.7 - 10.5 mg/dL Final   05/16/2022 9.1 8.7 - 10.5 mg/dL Final     Total Protein   Date Value Ref Range Status   02/16/2023 7.4 6.0 - 8.4 g/dL Final   05/16/2022 7.1 6.0 - 8.4 g/dL Final   04/01/2022 7.5 6.0 - 8.4 g/dL Final     Albumin   Date Value Ref Range Status   02/16/2023 3.9 3.5 - 5.2 g/dL Final   05/16/2022 3.8 3.5 - 5.2 g/dL Final   04/01/2022 3.8 3.5 - 5.2 g/dL Final     Total Bilirubin   Date Value Ref Range Status   02/16/2023 0.2 0.1 - 1.0 mg/dL Final     Comment:     For infants and newborns, interpretation of results should be based  on gestational age, weight and in agreement with clinical  observations.    Premature Infant recommended reference ranges:  Up to 24 hours.............<8.0 mg/dL  Up to 48 hours............<12.0 mg/dL  3-5 days..................<15.0 mg/dL  6-29 days.................<15.0 mg/dL     05/16/2022 0.2 0.1 - 1.0 mg/dL Final     Comment:     For infants and newborns, interpretation of results should be based  on gestational age, weight and in agreement with clinical  observations.    Premature Infant recommended reference ranges:  Up to 24 hours.............<8.0 mg/dL  Up to 48 hours............<12.0 mg/dL  3-5 days..................<15.0 mg/dL  6-29 days.................<15.0 mg/dL     04/01/2022 0.2 0.1 - 1.0 mg/dL Final     Comment:     For infants and newborns, interpretation of results should be based  on gestational age, weight and in agreement with clinical  observations.    Premature Infant recommended reference ranges:  Up to 24 hours.............<8.0 mg/dL  Up to 48 hours............<12.0 mg/dL  3-5  "days..................<15.0 mg/dL  6-29 days.................<15.0 mg/dL       Alkaline Phosphatase   Date Value Ref Range Status   02/16/2023 68 55 - 135 U/L Final   05/16/2022 77 55 - 135 U/L Final   04/01/2022 73 55 - 135 U/L Final     AST   Date Value Ref Range Status   02/16/2023 10 10 - 40 U/L Final   05/16/2022 16 10 - 40 U/L Final   04/01/2022 12 10 - 40 U/L Final     ALT   Date Value Ref Range Status   02/16/2023 14 10 - 44 U/L Final   05/16/2022 18 10 - 44 U/L Final   04/01/2022 12 10 - 44 U/L Final     Anion Gap   Date Value Ref Range Status   08/15/2023 9 8 - 16 mmol/L Final   02/16/2023 5 (L) 8 - 16 mmol/L Final   05/16/2022 7 (L) 8 - 16 mmol/L Final     eGFR   Date Value Ref Range Status   08/15/2023 >60.0 >60 mL/min/1.73 m^2 Final   02/16/2023 >60.0 >60 mL/min/1.73 m^2 Final     @labrcntip(troponini)@    BNP   Date Value Ref Range Status   03/15/2022 15 0 - 99 pg/mL Final     Comment:     Values of less than 100 pg/ml are consistent with non-CHF populations.   }   Lab Results   Component Value Date    CHOL 216 (H) 09/12/2023    CHOL 189 04/01/2022     Lab Results   Component Value Date    HDL 42 09/12/2023    HDL 44 04/01/2022     Lab Results   Component Value Date    LDLCALC 148 (H) 09/12/2023    LDLCALC 104.8 04/01/2022     Lab Results   Component Value Date    TRIG 142 09/12/2023    TRIG 201 (H) 04/01/2022     Lab Results   Component Value Date    CHOLHDL 23.3 04/01/2022      Last Holter: 9/2023  Last Echo: none  Last stress test: none  Cardiovascular angiogram: none  ECG: SA, rate 72, late transition  Fundoscopic exam: within the past year, negative for retinopathy    [facility] referred for recurrent palpitations since teen. No clear inciting factor. Makes her "body feels weird, tired". Maybe twice monthly, last up to seconds. Also concern CP, feels related to anxiety, 3-4 times monthly, last up to 10 minutes. No premature family history for CAD nor stroke.    Holter 9/2023 - The predominant rhythm is " "sinus with rare ectopy.  ·  Monitoring started at 11:29 AM and continued for 47 hr 59 min. The average heart rate was 77 BPM. The minimum heart rate was 45 BPM, occurring at 5:01:12 AM D2. The maximum heart rate was 162 BPM, occurring at 12:07:35 PM D2.  ·  No symptom identified.    CXR 5/2023 - PA and lateral chest compared with 3/15/2022 show normal cardiomediastinal silhouette.     Lungs are clear. Pulmonary vasculature is normal.     Wale James MD noted 9/19/2023 "Reports palpitations  Present for many years  No triggers or aggravating factors  Goes away on its own  Feels like heart skips a beat. Has not checked her heart rate when this occurs."          Review of Systems   Constitutional: Positive for chills and diaphoresis. Negative for fever, malaise/fatigue, night sweats and weight gain.   HENT:  Positive for stridor and tinnitus. Negative for nosebleeds.    Eyes:  Positive for blurred vision and photophobia. Negative for visual disturbance.   Cardiovascular:  Positive for chest pain and near-syncope. Negative for claudication, cyanosis, dyspnea on exertion, irregular heartbeat, leg swelling, orthopnea, palpitations and paroxysmal nocturnal dyspnea.   Respiratory:  Positive for snoring. Negative for cough, shortness of breath, sleep disturbances due to breathing and wheezing.         Galena score 12, awakened tired.   Endocrine: Negative for polydipsia and polyuria.   Hematologic/Lymphatic: Does not bruise/bleed easily.   Skin:  Negative for color change, flushing, nail changes, poor wound healing and suspicious lesions.   Musculoskeletal:  Negative for arthritis, falls, gout, joint pain, joint swelling, muscle cramps, muscle weakness and myalgias.   Gastrointestinal:  Positive for diarrhea, heartburn and nausea. Negative for hematemesis, hematochezia and melena.   Neurological:  Positive for headaches, light-headedness (with standing) and tremors. Negative for disturbances in coordination, " "excessive daytime sleepiness, dizziness, focal weakness, loss of balance, numbness, vertigo and weakness.   Psychiatric/Behavioral:  Positive for depression and memory loss. Negative for substance abuse. The patient has insomnia. The patient is not nervous/anxious.      Answers submitted by the patient for this visit:  Review of Systems Questionnaire (Submitted on 11/26/2023)  activity change: No  unexpected weight change: Yes  rhinorrhea: No  trouble swallowing: No  chest tightness: No  blood in stool: No  difficulty urinating: No  menstrual problem: No  arthralgias: No  confusion: No  dysphoric mood: Yes       Objective:    Physical Exam  Constitutional:       Appearance: She is well-developed.      Comments: RA O2 sat 99%  Orthostatic VS: sitting 126/86, standing 140/89   HENT:      Head: Normocephalic.   Eyes:      Conjunctiva/sclera: Conjunctivae normal.      Pupils: Pupils are equal, round, and reactive to light.   Neck:      Thyroid: No thyromegaly.      Vascular: No JVD.   Cardiovascular:      Rate and Rhythm: Normal rate and regular rhythm.      Pulses: Intact distal pulses.           Carotid pulses are 2+ on the right side and 2+ on the left side.       Radial pulses are 2+ on the right side and 2+ on the left side.        Dorsalis pedis pulses are 2+ on the right side and 2+ on the left side.        Posterior tibial pulses are 2+ on the right side and 2+ on the left side.      Heart sounds: Normal heart sounds. No murmur heard.     No friction rub. No gallop.   Pulmonary:      Effort: Pulmonary effort is normal.      Breath sounds: No rales.      Comments: Diminished breath sounds and prolong expiration.     Chest:      Chest wall: No tenderness.   Abdominal:      General: Bowel sounds are normal.      Palpations: Abdomen is soft.      Tenderness: There is no abdominal tenderness.      Comments: Waist 39.5"   Musculoskeletal:         General: Normal range of motion.      Cervical back: Normal range of " motion and neck supple.   Lymphadenopathy:      Cervical: No cervical adenopathy.   Skin:     General: Skin is warm and dry.      Findings: No rash.   Neurological:      Mental Status: She is alert and oriented to person, place, and time.           Assessment:       1. Palpitations    2. Encounter to establish care    3. PAC (premature atrial contraction)    4. Smoker, started age 16, 1 ppd    5. Marijuana smoker, started age 16    6. Excessive caffeine intake    7. Iron deficiency anemia due to chronic blood loss    8. Hypercholesterolemia    9. GLYNN (generalized anxiety disorder)    10. Excessive daytime sleepiness    11. Snoring    12. Witnessed episode of apnea    13. Orthostatic lightheadedness    14. Cognitive dysfunction    15. Obese abdomen    16. Precordial chest pain         Plan:       Gustavo was seen today for consult.    Diagnoses and all orders for this visit:    Palpitations  -     IN OFFICE EKG 12-LEAD (to [place])  -     Ambulatory referral/consult to Cardiology    Encounter to establish care    PAC (premature atrial contraction)  -     Ambulatory referral/consult to Cardiology    Smoker, started age 16, 1 ppd    Marijuana smoker, started age 16    Excessive caffeine intake    Iron deficiency anemia due to chronic blood loss    Hypercholesterolemia    GLYNN (generalized anxiety disorder)    Excessive daytime sleepiness  -     Polysomnogram (CPAP will be added if patient meets diagnostic criteria.); Future    Snoring  -     Polysomnogram (CPAP will be added if patient meets diagnostic criteria.); Future    Witnessed episode of apnea  -     Polysomnogram (CPAP will be added if patient meets diagnostic criteria.); Future    Orthostatic lightheadedness    Cognitive dysfunction    Obese abdomen    Precordial chest pain  -     IN OFFICE EKG 12-LEAD (to [place])     - All medical issues reviewed, need to quit smoking  - Warning signs of MI and stroke given, if symptoms last more than 5 minutes, stop immediately and  call 911, then chew 2-4 low-dose ASA (81 mg).   - Consider use of Potassium chloride salt substitute, [place] Nu-Salt.   - Teach the body to relax, try one of these for at least 30 minutes daily: meditation, deep prayer, gentle yoga, margot chi, paced breathing or visualizing yourself in a calm, safe place.  - CBT, cognitive behavior therapy for sleep and anxiety disorder.    - Can consider reducing caffeine intake.  - Info on [facility]  - CV status all medications reviewed, patient acknowledge good understanding.  - Recommend healthy living: no nicotine, moderate alcohol, healthy diet and regular exercise aiming for fitness, restorative sleep and weight control  - Need good exercise program, 4 key elements: 1. Aerobic (walking, swimming, dancing, jogging, biking, etc, 2. Muscle strengthening / resistance exercise, need to do 2-3 times weekly, 3. Stretching daily, good stretch takes a whole  total minute. 4. Balance exercise daily.   - Instruction for Mediterranean, high potassium diet and heart healthy exercise given.  - Weigh twice weekly, try to lose 1-2 lbs per week. Target weight loss of 5%-10%.  - Highly recommend 30-60 minutes of exercise / activities daily, can have Sunday off, with 2-3 sessions of muscle strengthening weekly. A  would be very helpful.  - Low ASCVD risk, follow up only as needed.   - Phone review / encourage use of [facility]       Total time spend including review of record prior to face-to-face visit is 60 minutes. Greater than 50% of the time was spent in counseling and coordination of care. The above assessment and plan have been discussed at length. Referring provider's note reviewed. Labs and procedure over the last 6 months reviewed. Problem List updated. Asked to bring in all active medications / pills bottles with next visit. Will send note to referring / PCP.

## 2023-11-27 NOTE — PATIENT INSTRUCTIONS
Recommended Mediterranean dietEating Heart-Healthy Food: Using the DASH Plan  Eating for your heart doesnt have to be hard or boring. You just need to know how to make healthier choices. The DASH eating plan has been developed to help you do just that. DASH stands for Dietary Approaches to Stop Hypertension. It is a plan that has been proven to be healthier for your heart and to lower your risk for high blood pressure. It can also help lower your risk for cancer, heart disease, osteoporosis, and diabetes.  Choosing from Each Food Group  Choose foods from each of the food groups below each day. Try to get the recommended number of servings for each food group. The serving numbers are based on a diet of 2,000 calories a day. Talk to your doctor if youre unsure about your calorie needs.  Grains   Servings: 7-8 a day  A serving is:  1 slice bread  1 ounce dry cereal  half a cup cooked rice or pasta  Best choices: Whole grains and any grains high in fiber.  Vegetables   Servings: 4-5 a day  A serving is:  1 cup raw leafy vegetable  Half a cup cooked vegetable  Three-quarter cup vegetable juice  Best choices: Fresh or frozen vegetable prepared without too much added salt or fat.    Fruits   Servings: 4-5 a day  A serving is:  Three-quarter cup fruit juice  1 medium fruit  One-quarter cup dried fruit  One-half cup fresh, frozen, or canned fruit  Best choices: A variety of fresh fruits of different colors. Whole fruits are a much better choice than fruit juices.  Low-fat or Fat Free Dairy   Servings: 2-3 a day  A serving is:  8 ounces milk  1 cup yogurt  One and a half ounces cheese  Best choices: Skim or 1% milk, low-fat or fat free yogurt or buttermilk, and low-fat cheeses.       Meat, Poultry, Fish   Servings: 2 or fewer a day  A serving is:  3 ounces cooked meat, poultry, or fish  Best choices: Lean meats and fish. Trim away visible fat. Broil, roast, or boil instead of frying. Remove skin from poultry before eating.   Nuts, Seeds, Beans   Servings: 4-5 a week  A serving is:  One third cup nuts (or one and a half ounces)  2 tablespoons sunflower seeds  Half a cup cooked beans  Best choices: Dry roasted nuts with no salt added, lentils, kidney beans, garbanzo beans, and whole chaidez beans.    Fats and Oils   Servings: 2 a day  A serving is:  1 teaspoon vegetable oil  1 teaspoon soft margarine  1 tablespoon low-fat mayonnaise  1 teaspoon regular mayonnaise  2 tablespoons light salad dressing  1 tablespoon regular salad dressing  Best choices: Monounsaturated and polyunsaturated fats such as olive, canola, or safflower oil.  Sweets   Servings: 5 a week or fewer  A serving is:  1 tablespoon sugar, maple syrup, or honey  1 tablespoon jam or jelly  1 half-ounce jelly beans (about 15)  8 ounces lemonade  Best choices: Dried fruit can be a satisfying sweet. Choose low-fat sweets when possible. And watch your serving sizes!       Aerobic Exercise for a Healthy Heart  Exercise is a lot more than an energy booster and a stress reliever. It also strengthens your heart muscle, lowers your blood pressure and blood cholesterol, and burns calories.      Remember, some activity is better than none.     Choose an Aerobic Activity  Choose a nonstop activity that makes your heart and lungs work harder than they do when you rest or walk normally. This aerobic exercise can improve the way your heart and other muscles use oxygen. Make it fun by exercising with a friend and choosing an activity you enjoy. Here are some ideas:  Walking  Swimming  Bicycling  Stair climbing  Dancing  Jogging  Exercise Regularly  If you havent been exercising regularly,  get your doctors okay first. Then start slowly.  Here are some tips:  Begin exercising 3 times a week for 5-10 minutes at a time.  When you feel comfortable, add a few minutes each week.  Slowly build up to exercising 3-4 times each week for 20-40 minutes. Aim for a total of 150 or more minutes a  week.  Be sure to carry your nitroglycerin with you when you exercise.  If you get angina when youre exercising, stop what youre doing, take your nitroglycerin, and call your doctor.  © 5953-8252 [employer], [address] [place] [address], [place] [address]. All rights reserved. This information is not intended as a substitute for professional medical care. Always follow your healthcare professional's instructions.    Losing Weight (Cardiovascular)  Excess weight is a major risk factor for heart disease. Losing weight may help keep your arteries open so that your heart can get the oxygen-rich blood it needs. Weight loss can also help lower your blood pressure and reduce your risk for diabetes. All in all, losing weight makes you healthier.          Exercise with a friend. When activity is fun, you're more likely to stick with it.        Calories and Weight Loss  Calories are the fuel your body burns for energy. You get the calories you need from the food you eat. For healthy weight loss, women should eat at least 1,200 calories a day, men at least 1,500.    When you eat more calories than you need, your body stores the extra calories as fat. One pound of fat equals 3,500 calories.    To lose weight, try to burn 500 calories a day more than you eat. To do this, eat 250 calories less each day. Add activity to burn the other 250 calories. Walking 21/2 miles burns about 250 calories.    Eat a variety of healthy foods. Its the best way to make calories count.     Tips for losing weight:  Drink 8 to 10 glasses of water a day.    Dont skip meals. Instead, eat smaller portions.       Brisk Activity Is Best  Brisk activity gets your heart pumping faster. It makes your heart healthier. Its also the best way to burn calories. In fact, your body may keep burning calories for hours after you stop a brisk activity.    Begin by walking 10 minutes most days.    Add more time and speed to your walk. Build up as you feel  able.    Try to walk briskly at least 30 minutes most days. If needed, you can break this into 2 shorter sessions.     Check off the ideas below that you could try to make your day more active:    Take the stairs instead of the elevator.    Park your car farther away and walk.    Ride a bike to work or to the store.    Walk laps around the mall.

## 2024-02-19 ENCOUNTER — PATIENT MESSAGE (OUTPATIENT)
Dept: CARDIOLOGY | Facility: CLINIC | Age: 29
End: 2024-02-19
Payer: COMMERCIAL

## 2024-02-19 DIAGNOSIS — E78.00 HYPERCHOLESTEROLEMIA: Primary | ICD-10-CM

## 2024-02-22 ENCOUNTER — TELEPHONE (OUTPATIENT)
Dept: FAMILY MEDICINE | Facility: CLINIC | Age: 29
End: 2024-02-22
Payer: COMMERCIAL

## 2024-02-22 NOTE — TELEPHONE ENCOUNTER
----- Message from Patty Abreu sent at 2/22/2024  9:02 AM CST -----  Regarding: Needs return call  Type: Needs Medical Advice  Who Called:  Gustavo Owens Call Back Number: [phone]    Additional Information: Pt would like to schedule a eca appt with someone in the office and my schedule would not show, please call to advise

## 2024-02-28 ENCOUNTER — OFFICE VISIT (OUTPATIENT)
Dept: FAMILY MEDICINE | Facility: CLINIC | Age: 29
End: 2024-02-28
Payer: COMMERCIAL

## 2024-02-28 DIAGNOSIS — R41.89 BRAIN FOG: ICD-10-CM

## 2024-02-28 DIAGNOSIS — F31.9 BIPOLAR 1 DISORDER: ICD-10-CM

## 2024-02-28 DIAGNOSIS — G47.00 INSOMNIA, UNSPECIFIED TYPE: ICD-10-CM

## 2024-02-28 DIAGNOSIS — D50.9 IRON DEFICIENCY ANEMIA, UNSPECIFIED IRON DEFICIENCY ANEMIA TYPE: ICD-10-CM

## 2024-02-28 DIAGNOSIS — E55.9 VITAMIN D DEFICIENCY: ICD-10-CM

## 2024-02-28 DIAGNOSIS — F41.9 ANXIETY: ICD-10-CM

## 2024-02-28 DIAGNOSIS — R00.2 PALPITATIONS: Primary | ICD-10-CM

## 2024-02-28 DIAGNOSIS — Z76.89 ENCOUNTER TO ESTABLISH CARE: ICD-10-CM

## 2024-02-28 PROCEDURE — 99214 OFFICE O/P EST MOD 30 MIN: CPT | Mod: 95,,, | Performed by: FAMILY MEDICINE

## 2024-02-28 RX ORDER — QUETIAPINE FUMARATE 50 MG/1
50 TABLET, FILM COATED ORAL NIGHTLY
Qty: 90 TABLET | Refills: 3 | Status: SHIPPED | OUTPATIENT
Start: 2024-02-28 | End: 2025-02-27

## 2024-02-28 NOTE — PROGRESS NOTES
Subjective:       Patient ID: Gustavo Perry is a 28 y.o. female.  The patient location is:  [facility]  The chief complaint leading to consultation is:  Anxiety    Visit type: audiovisual    Face to Face time with patient:  20 minutes  35 minutes minutes of total time spent on the encounter, which includes face to face time and non-face to face time preparing to see the patient (eg, review of tests), Obtaining and/or reviewing separately obtained history, Documenting clinical information in the electronic or other health record, Independently interpreting results (not separately reported) and communicating results to the patient/family/caregiver, or Care coordination (not separately reported).         Each patient to whom he or she provides medical services by telemedicine is:  (1) informed of the relationship between the physician and patient and the respective role of any other health care provider with respect to management of the patient; and (2) notified that he or she may decline to receive medical services by telemedicine and may withdraw from such care at any time.      Chief Complaint: No chief complaint on file.    Ms. Perry is a 28-year-old female who is the mother of 4 young children ages 3-9.  She is here to establish care via virtual visit.  She is concerned about her increasing anxiety and her lack of sleep.  She was previously diagnosed with bipolar 1 disorder by a family medicine physician.  She has also been diagnosed with anger and depression by a different family medicine physician.  She has a  brother who was diagnosed with borderline schizophrenia in his teens by a licensed psychiatrist.  She has been seen by [facility] in the past her anxiety, but does not remember any specific diagnoses.  It has been over a year since her last labs and at the time of her last labs she had a low normal vitamin B12 and a low  vitamin-D.  She was recently seen (proximally 2-1/2 months ago)  by physician in [facility] in diagnosed with anxiety.  She was placed on Vraylar 3 mg.  She states that it really has not helped her anxiety much at all, and as far she can tell the only thing it has done as taking the edge off of her anger.  In light of the numerous diagnoses by different family medicine physicians I feel she would do better with a licensed psychologist or psychiatrist to get a definitive diagnoses to better treat her with the appropriate medication.  We did discuss a low-dose Seroquel which can help bipolar 1 (if she truly has it) but it will help her with her anxiety induced insomnia.  Routine labs will be done, mental health consult will be done and we will start Seroquel at low-dose.  We will have her follow up in 3 months and she will keep in contact using the portal system      Review of Systems   Constitutional:  Positive for unexpected weight change. Negative for activity change.   HENT:  Negative for hearing loss, rhinorrhea and trouble swallowing.    Eyes:  Negative for discharge and visual disturbance.   Respiratory:  Positive for chest tightness. Negative for wheezing.    Cardiovascular:  Positive for chest pain and palpitations.   Gastrointestinal:  Negative for blood in stool, constipation, diarrhea and vomiting.   Endocrine: Negative for polydipsia and polyuria.   Genitourinary:  Negative for difficulty urinating, dysuria, hematuria and menstrual problem.   Musculoskeletal:  Positive for neck pain. Negative for arthralgias and joint swelling.   Neurological:  Positive for headaches. Negative for weakness.   Psychiatric/Behavioral:  Positive for agitation, decreased concentration, dysphoric mood and sleep disturbance. Negative for confusion. The patient is nervous/anxious.        Patient Active Problem List   Diagnosis    Rh negative status during pregnancy    History of trichomoniasis    History of PCR DNA positive for HSV2    Well woman exam with routine gynecological exam     Iron deficiency anemia due to chronic blood loss    Obese abdomen    Gastroesophageal reflux disease    History of cholecystectomy    Menorrhagia with regular cycle    Dysmenorrhea    Vaginal discharge    Dyspareunia in female    Current mild episode of major depressive disorder    Bacterial vaginosis    Smoker, started age 16, 1 ppd    Marijuana smoker, started age 16    Excessive caffeine intake    Hypercholesterolemia, baseline LDL-C 148    GLYNN (generalized anxiety disorder)    Excessive daytime sleepiness    Snoring    Witnessed episode of apnea    Orthostatic lightheadedness    Cognitive dysfunction    Precordial chest pain       Objective:      Physical Exam  Constitutional:       Appearance: Normal appearance.   Neurological:      General: No focal deficit present.      Mental Status: She is alert and oriented to person, place, and time.   Psychiatric:         Behavior: Behavior normal.         Thought Content: Thought content normal.      Comments: Patient with concerns over her Anxiety and lack of response to the Vraylar.  She is concerned about her medical diagnoses as she has not sure what is making her feel the way she feels, and would like a definitive diagnosis.  She feels like she may have ADHD as she has difficulty concentrating and focusing.  She does have a low B12 and low D which can contribute to that also         Lab Results   Component Value Date    WBC 7.7 09/12/2023    HGB 11.3 09/12/2023    HCT 36.2 09/12/2023     09/12/2023    CHOL 216 (H) 09/12/2023    TRIG 142 09/12/2023    HDL 42 09/12/2023    ALT 14 02/16/2023    AST 10 02/16/2023     08/15/2023    K 3.5 08/15/2023     08/15/2023    CREATININE 0.9 08/15/2023    BUN 10 08/15/2023    CO2 22 (L) 08/15/2023    TSH 0.612 02/16/2023    INR 1.0 03/15/2022    HGBA1C 5.1 08/15/2023     The ASCVD Risk score ([place] DK, et al., 2019) failed to calculate for the following reasons:    The 2019 ASCVD risk score is only valid for  ages 40 to 79    Assessment:       1. Palpitations    2. Insomnia, unspecified type    3. Iron deficiency anemia, unspecified iron deficiency anemia type    4. Vitamin D deficiency    5. Encounter to establish care    6. Brain fog    7. Anxiety    8. Bipolar 1 disorder        Plan:       1. Palpitations  -     TSH; Future; Expected date: 02/28/2024    2. Insomnia, unspecified type  -     Vitamin B12; Future; Expected date: 02/28/2024  -     TSH; Future; Expected date: 02/28/2024  -     Ambulatory referral/consult to Behavioral Health; Future; Expected date: 03/06/2024    3. Iron deficiency anemia, unspecified iron deficiency anemia type  -     CBC Auto Differential; Future; Expected date: 02/28/2024  -     Comprehensive Metabolic Panel; Future; Expected date: 02/28/2024    4. Vitamin D deficiency  -     Vitamin D; Future; Expected date: 02/28/2024    5. Encounter to establish care  -     Microalbumin/Creatinine Ratio, Urine; Future; Expected date: 02/28/2024  -     Vitamin D; Future; Expected date: 02/28/2024  -     Vitamin B12; Future; Expected date: 02/28/2024  -     Lipid Panel; Future; Expected date: 02/28/2024  -     CBC Auto Differential; Future; Expected date: 02/28/2024  -     Comprehensive Metabolic Panel; Future; Expected date: 02/28/2024  -     Hemoglobin A1C; Future; Expected date: 02/28/2024  -     TSH; Future; Expected date: 02/28/2024    6. Brain fog  -     Vitamin D; Future; Expected date: 02/28/2024  -     Vitamin B12; Future; Expected date: 02/28/2024  -     Hemoglobin A1C; Future; Expected date: 02/28/2024    7. Anxiety  -     Ambulatory referral/consult to Behavioral Health; Future; Expected date: 03/06/2024    8. Bipolar 1 disorder  -     Ambulatory referral/consult to Behavioral Health; Future; Expected date: 03/06/2024    Other orders  -     QUEtiapine (SEROQUEL) 50 MG tablet; Take 1 tablet (50 mg total) by mouth every evening.  Dispense: 90 tablet; Refill: 3       Follow up in about 3 months  (around 5/28/2024), or if symptoms worsen or fail to improve.

## 2024-03-01 ENCOUNTER — LAB VISIT (OUTPATIENT)
Dept: LAB | Facility: HOSPITAL | Age: 29
End: 2024-03-01
Attending: FAMILY MEDICINE
Payer: COMMERCIAL

## 2024-03-01 DIAGNOSIS — Z76.89 ENCOUNTER TO ESTABLISH CARE: ICD-10-CM

## 2024-03-01 DIAGNOSIS — G47.00 INSOMNIA, UNSPECIFIED TYPE: ICD-10-CM

## 2024-03-01 DIAGNOSIS — R41.89 BRAIN FOG: ICD-10-CM

## 2024-03-01 DIAGNOSIS — D50.9 IRON DEFICIENCY ANEMIA, UNSPECIFIED IRON DEFICIENCY ANEMIA TYPE: ICD-10-CM

## 2024-03-01 DIAGNOSIS — E55.9 VITAMIN D DEFICIENCY: ICD-10-CM

## 2024-03-01 DIAGNOSIS — R00.2 PALPITATIONS: ICD-10-CM

## 2024-03-01 LAB
25(OH)D3+25(OH)D2 SERPL-MCNC: 20 NG/ML (ref 30–96)
ALBUMIN/CREAT UR: 3.1 UG/MG (ref 0–30)
BASOPHILS # BLD AUTO: 0.05 K/UL (ref 0–0.2)
BASOPHILS NFR BLD: 0.7 % (ref 0–1.9)
CHOLEST SERPL-MCNC: 236 MG/DL (ref 120–199)
CHOLEST/HDLC SERPL: 5.5 {RATIO} (ref 2–5)
CREAT UR-MCNC: 162 MG/DL (ref 15–325)
DIFFERENTIAL METHOD BLD: ABNORMAL
EOSINOPHIL # BLD AUTO: 0.1 K/UL (ref 0–0.5)
EOSINOPHIL NFR BLD: 1.1 % (ref 0–8)
ERYTHROCYTE [DISTWIDTH] IN BLOOD BY AUTOMATED COUNT: 17.8 % (ref 11.5–14.5)
ESTIMATED AVG GLUCOSE: 100 MG/DL (ref 68–131)
HBA1C MFR BLD: 5.1 % (ref 4–5.6)
HCT VFR BLD AUTO: 38.2 % (ref 37–48.5)
HDLC SERPL-MCNC: 43 MG/DL (ref 40–75)
HDLC SERPL: 18.2 % (ref 20–50)
HGB BLD-MCNC: 12.6 G/DL (ref 12–16)
IMM GRANULOCYTES # BLD AUTO: 0.01 K/UL (ref 0–0.04)
IMM GRANULOCYTES NFR BLD AUTO: 0.1 % (ref 0–0.5)
LDLC SERPL CALC-MCNC: 172.6 MG/DL (ref 63–159)
LYMPHOCYTES # BLD AUTO: 2.6 K/UL (ref 1–4.8)
LYMPHOCYTES NFR BLD: 35.6 % (ref 18–48)
MCH RBC QN AUTO: 26 PG (ref 27–31)
MCHC RBC AUTO-ENTMCNC: 33 G/DL (ref 32–36)
MCV RBC AUTO: 79 FL (ref 82–98)
MICROALBUMIN UR DL<=1MG/L-MCNC: 5 UG/ML
MONOCYTES # BLD AUTO: 0.4 K/UL (ref 0.3–1)
MONOCYTES NFR BLD: 5.3 % (ref 4–15)
NEUTROPHILS # BLD AUTO: 4.2 K/UL (ref 1.8–7.7)
NEUTROPHILS NFR BLD: 57.2 % (ref 38–73)
NONHDLC SERPL-MCNC: 193 MG/DL
NRBC BLD-RTO: 0 /100 WBC
PLATELET # BLD AUTO: 276 K/UL (ref 150–450)
PMV BLD AUTO: 9.7 FL (ref 9.2–12.9)
RBC # BLD AUTO: 4.85 M/UL (ref 4–5.4)
TRIGL SERPL-MCNC: 102 MG/DL (ref 30–150)
TSH SERPL DL<=0.005 MIU/L-ACNC: 0.82 UIU/ML (ref 0.4–4)
VIT B12 SERPL-MCNC: 335 PG/ML (ref 210–950)
WBC # BLD AUTO: 7.31 K/UL (ref 3.9–12.7)

## 2024-03-01 PROCEDURE — 82607 VITAMIN B-12: CPT | Performed by: FAMILY MEDICINE

## 2024-03-01 PROCEDURE — 82306 VITAMIN D 25 HYDROXY: CPT | Performed by: FAMILY MEDICINE

## 2024-03-01 PROCEDURE — 80061 LIPID PANEL: CPT | Performed by: FAMILY MEDICINE

## 2024-03-01 PROCEDURE — 85025 COMPLETE CBC W/AUTO DIFF WBC: CPT | Performed by: FAMILY MEDICINE

## 2024-03-01 PROCEDURE — 82043 UR ALBUMIN QUANTITATIVE: CPT | Performed by: FAMILY MEDICINE

## 2024-03-01 PROCEDURE — 83036 HEMOGLOBIN GLYCOSYLATED A1C: CPT | Performed by: FAMILY MEDICINE

## 2024-03-01 PROCEDURE — 36415 COLL VENOUS BLD VENIPUNCTURE: CPT | Performed by: FAMILY MEDICINE

## 2024-03-01 PROCEDURE — 84443 ASSAY THYROID STIM HORMONE: CPT | Performed by: FAMILY MEDICINE

## 2024-03-04 DIAGNOSIS — E55.9 VITAMIN D DEFICIENCY: Primary | ICD-10-CM

## 2024-03-04 RX ORDER — ERGOCALCIFEROL 1.25 MG/1
50000 CAPSULE ORAL
Qty: 13 CAPSULE | Refills: 3 | Status: SHIPPED | OUTPATIENT
Start: 2024-03-04

## 2024-03-05 RX ORDER — ATORVASTATIN CALCIUM 10 MG/1
10 TABLET, FILM COATED ORAL DAILY
Qty: 90 TABLET | Refills: 3 | Status: SHIPPED | OUTPATIENT
Start: 2024-03-05 | End: 2025-03-05

## 2024-03-14 ENCOUNTER — PATIENT MESSAGE (OUTPATIENT)
Dept: FAMILY MEDICINE | Facility: CLINIC | Age: 29
End: 2024-03-14
Payer: COMMERCIAL

## 2024-03-15 ENCOUNTER — OFFICE VISIT (OUTPATIENT)
Dept: FAMILY MEDICINE | Facility: CLINIC | Age: 29
End: 2024-03-15
Payer: COMMERCIAL

## 2024-03-15 VITALS
DIASTOLIC BLOOD PRESSURE: 76 MMHG | HEIGHT: 67 IN | HEART RATE: 77 BPM | WEIGHT: 205 LBS | OXYGEN SATURATION: 98 % | BODY MASS INDEX: 32.18 KG/M2 | SYSTOLIC BLOOD PRESSURE: 120 MMHG

## 2024-03-15 DIAGNOSIS — R11.0 NAUSEA: ICD-10-CM

## 2024-03-15 DIAGNOSIS — G43.109 MIGRAINE WITH AURA AND WITHOUT STATUS MIGRAINOSUS, NOT INTRACTABLE: ICD-10-CM

## 2024-03-15 DIAGNOSIS — H61.23 BILATERAL IMPACTED CERUMEN: ICD-10-CM

## 2024-03-15 DIAGNOSIS — R42 DIZZINESS AND GIDDINESS: Primary | ICD-10-CM

## 2024-03-15 PROCEDURE — 3074F SYST BP LT 130 MM HG: CPT | Mod: S$GLB,,, | Performed by: FAMILY MEDICINE

## 2024-03-15 PROCEDURE — 3008F BODY MASS INDEX DOCD: CPT | Mod: S$GLB,,, | Performed by: FAMILY MEDICINE

## 2024-03-15 PROCEDURE — 1159F MED LIST DOCD IN RCRD: CPT | Mod: S$GLB,,, | Performed by: FAMILY MEDICINE

## 2024-03-15 PROCEDURE — 99214 OFFICE O/P EST MOD 30 MIN: CPT | Mod: S$GLB,,, | Performed by: FAMILY MEDICINE

## 2024-03-15 PROCEDURE — 99999 PR PBB SHADOW E&M-EST. PATIENT-LVL III: CPT | Mod: PBBFAC,,, | Performed by: FAMILY MEDICINE

## 2024-03-15 PROCEDURE — 3078F DIAST BP <80 MM HG: CPT | Mod: S$GLB,,, | Performed by: FAMILY MEDICINE

## 2024-03-15 PROCEDURE — 3044F HG A1C LEVEL LT 7.0%: CPT | Mod: S$GLB,,, | Performed by: FAMILY MEDICINE

## 2024-03-15 RX ORDER — PRAZOSIN HYDROCHLORIDE 1 MG/1
1 CAPSULE ORAL NIGHTLY
COMMUNITY
Start: 2024-01-03

## 2024-03-15 RX ORDER — RIZATRIPTAN BENZOATE 10 MG/1
10 TABLET, ORALLY DISINTEGRATING ORAL
Qty: 30 TABLET | Refills: 1 | Status: SHIPPED | OUTPATIENT
Start: 2024-03-15 | End: 2024-04-14

## 2024-03-15 RX ORDER — ONDANSETRON 4 MG/1
8 TABLET, ORALLY DISINTEGRATING ORAL 2 TIMES DAILY
Qty: 30 TABLET | Refills: 3 | Status: SHIPPED | OUTPATIENT
Start: 2024-03-15

## 2024-03-15 NOTE — PROGRESS NOTES
Subjective:       Patient ID: Gustavo Perry is a 28 y.o. female.    Chief Complaint: Dizziness (Pt presenting to clinic for possible vertigo. Pt stated when she turns her head, squats or driving she has dizzy spells. She states she sometimes needs to hold on to something to keep from falling. )    Ms Perry is a 27 yo female who has increasing episodes of vertigo. She States that she sometimes has visual disturbances that sometimes include her vision going to black. She is having increasing pre-syncopal episodes.     Dizziness:    Associated symptoms: headaches, weakness and light-headedness.no environmental allergies.    Review of Systems   HENT:          Stopped up ears   Allergic/Immunologic: Negative for environmental allergies.   Neurological:  Positive for dizziness, syncope, weakness, light-headedness, numbness and headaches.        Presyncope, but not full syncope   Hematological:  Bruises/bleeds easily.   Psychiatric/Behavioral:  Positive for sleep disturbance.        Patient Active Problem List   Diagnosis    Rh negative status during pregnancy    History of trichomoniasis    History of PCR DNA positive for HSV2    Well woman exam with routine gynecological exam    Iron deficiency anemia due to chronic blood loss    Obese abdomen    Gastroesophageal reflux disease    History of cholecystectomy    Menorrhagia with regular cycle    Dysmenorrhea    Vaginal discharge    Dyspareunia in female    Current mild episode of major depressive disorder    Bacterial vaginosis    Smoker, started age 16, 1 ppd    Marijuana smoker, started age 16    Excessive caffeine intake    Hypercholesterolemia, baseline LDL-C 148    GLYNN (generalized anxiety disorder)    Excessive daytime sleepiness    Snoring    Witnessed episode of apnea    Orthostatic lightheadedness    Cognitive dysfunction    Precordial chest pain       Objective:      Physical Exam  Vitals and nursing note reviewed.   Constitutional:       Appearance: Normal  "appearance. She is normal weight.   HENT:      Head: Normocephalic.      Right Ear: There is impacted cerumen.      Left Ear: There is impacted cerumen.      Nose: Nose normal.   Eyes:      Extraocular Movements: Extraocular movements intact.      Conjunctiva/sclera: Conjunctivae normal.      Pupils: Pupils are equal, round, and reactive to light.   Cardiovascular:      Rate and Rhythm: Normal rate and regular rhythm.      Heart sounds: Normal heart sounds. No murmur heard.  Pulmonary:      Effort: Pulmonary effort is normal. No respiratory distress.      Breath sounds: Normal breath sounds.   Musculoskeletal:         General: Normal range of motion.      Cervical back: Normal range of motion and neck supple.   Skin:     General: Skin is warm and dry.   Neurological:      General: No focal deficit present.      Mental Status: She is alert and oriented to person, place, and time.   Psychiatric:         Mood and Affect: Mood normal.         Behavior: Behavior normal.         Lab Results   Component Value Date    WBC 7.31 03/01/2024    HGB 12.6 03/01/2024    HCT 38.2 03/01/2024     03/01/2024    CHOL 236 (H) 03/01/2024    TRIG 102 03/01/2024    HDL 43 03/01/2024    ALT 14 02/16/2023    AST 10 02/16/2023     08/15/2023    K 3.5 08/15/2023     08/15/2023    CREATININE 0.9 08/15/2023    BUN 10 08/15/2023    CO2 22 (L) 08/15/2023    TSH 0.823 03/01/2024    INR 1.0 03/15/2022    HGBA1C 5.1 03/01/2024     The ASCVD Risk score ([place] DK, et al., 2019) failed to calculate for the following reasons:    The 2019 ASCVD risk score is only valid for ages 40 to 79  Visit Vitals  /76 (BP Location: Right arm, Patient Position: Sitting, BP Method: Medium (Manual))   Pulse 77   Ht 5' 7" (1.702 m)   Wt 93 kg (205 lb)   LMP  (LMP Unknown)   SpO2 98%   BMI 32.11 kg/m²      Assessment:       1. Dizziness and giddiness    2. Migraine with aura and without status migrainosus, not intractable    3. Nausea    4. " Bilateral impacted cerumen        Plan:       1. Dizziness and giddiness  -     CT Head Without Contrast; Future; Expected date: 03/15/2024    2. Migraine with aura and without status migrainosus, not intractable  -     CT Head Without Contrast; Future; Expected date: 03/15/2024  -     rizatriptan (MAXALT-MLT) 10 MG disintegrating tablet; Take 1 tablet (10 mg total) by mouth as needed for Migraine. May repeat in 2 hours if needed  Dispense: 30 tablet; Refill: 1    3. Nausea  -     ondansetron (ZOFRAN-ODT) 4 MG TbDL; Take 2 tablets (8 mg total) by mouth 2 (two) times daily.  Dispense: 30 tablet; Refill: 3    4. Bilateral impacted cerumen  -     Ear wax removal       Follow up in about 3 months (around 6/15/2024), or if symptoms worsen or fail to improve.

## 2024-03-22 ENCOUNTER — PATIENT MESSAGE (OUTPATIENT)
Dept: FAMILY MEDICINE | Facility: CLINIC | Age: 29
End: 2024-03-22
Payer: COMMERCIAL

## 2024-03-22 DIAGNOSIS — N64.4 BREAST PAIN: Primary | ICD-10-CM

## 2024-03-25 ENCOUNTER — OFFICE VISIT (OUTPATIENT)
Dept: FAMILY MEDICINE | Facility: CLINIC | Age: 29
End: 2024-03-25
Payer: COMMERCIAL

## 2024-03-25 ENCOUNTER — LAB VISIT (OUTPATIENT)
Dept: LAB | Facility: HOSPITAL | Age: 29
End: 2024-03-25
Attending: FAMILY MEDICINE
Payer: COMMERCIAL

## 2024-03-25 VITALS
HEIGHT: 67 IN | HEART RATE: 78 BPM | DIASTOLIC BLOOD PRESSURE: 80 MMHG | SYSTOLIC BLOOD PRESSURE: 102 MMHG | OXYGEN SATURATION: 98 % | RESPIRATION RATE: 12 BRPM | WEIGHT: 207.13 LBS | BODY MASS INDEX: 32.51 KG/M2

## 2024-03-25 DIAGNOSIS — Z20.2 EXPOSURE TO STD: ICD-10-CM

## 2024-03-25 DIAGNOSIS — N30.00 ACUTE CYSTITIS WITHOUT HEMATURIA: ICD-10-CM

## 2024-03-25 DIAGNOSIS — Z20.2 EXPOSURE TO STD: Primary | ICD-10-CM

## 2024-03-25 LAB
HIV 1+2 AB+HIV1 P24 AG SERPL QL IA: NORMAL
KOH PREP SPEC: NORMAL

## 2024-03-25 PROCEDURE — 86592 SYPHILIS TEST NON-TREP QUAL: CPT | Performed by: FAMILY MEDICINE

## 2024-03-25 PROCEDURE — 99999 PR PBB SHADOW E&M-EST. PATIENT-LVL I: CPT | Mod: PBBFAC,,, | Performed by: FAMILY MEDICINE

## 2024-03-25 PROCEDURE — 87389 HIV-1 AG W/HIV-1&-2 AB AG IA: CPT | Performed by: FAMILY MEDICINE

## 2024-03-25 PROCEDURE — 3044F HG A1C LEVEL LT 7.0%: CPT | Mod: S$GLB,,, | Performed by: FAMILY MEDICINE

## 2024-03-25 PROCEDURE — 99214 OFFICE O/P EST MOD 30 MIN: CPT | Mod: S$GLB,,, | Performed by: FAMILY MEDICINE

## 2024-03-25 PROCEDURE — 87210 SMEAR WET MOUNT SALINE/INK: CPT | Performed by: FAMILY MEDICINE

## 2024-03-25 PROCEDURE — 36415 COLL VENOUS BLD VENIPUNCTURE: CPT | Performed by: FAMILY MEDICINE

## 2024-03-25 RX ORDER — NITROFURANTOIN 25; 75 MG/1; MG/1
100 CAPSULE ORAL 2 TIMES DAILY
Qty: 20 CAPSULE | Refills: 0 | Status: SHIPPED | OUTPATIENT
Start: 2024-03-25

## 2024-03-25 NOTE — PROGRESS NOTES
Subjective:       Patient ID: Gustavo Perry is a 28 y.o. female.    Chief Complaint: No chief complaint on file.      Past Medical History:   Diagnosis Date    Anxiety     Bipolar 1 disorder, depressed     Depression     Herpes simplex virus (HSV) infection     PTSD (post-traumatic stress disorder)        Past Surgical History:   Procedure Laterality Date     SECTION      CHOLECYSTECTOMY      ESOPHAGOGASTRODUODENOSCOPY N/A 2022    Procedure: EGD (ESOPHAGOGASTRODUODENOSCOPY);  Surgeon: Andrea Manning MD;  Location: [facility] ENDO;  Service: Endoscopy;  Laterality: N/A;    ESOPHAGOGASTRODUODENOSCOPY N/A 2023    Procedure: ESOPHAGOGASTRODUODENOSCOPY (EGD);  Surgeon: Eric Huston MD;  Location: [facility] ENDO;  Service: General;  Laterality: N/A;    LAPAROSCOPIC CHOLECYSTECTOMY Bilateral 2022    Procedure: CHOLECYSTECTOMY-LAPAROSCOPIC;  Surgeon: Eric Huston MD;  Location: [facility] OR;  Service: General;  Laterality: Bilateral;    TONSILLECTOMY, ADENOIDECTOMY      TUBAL LIGATION      WISDOM TOOTH EXTRACTION          Social History     Socioeconomic History    Marital status:    Tobacco Use    Smoking status: Every Day     Current packs/day: 1.00     Average packs/day: 1 pack/day for 15.0 years (15.0 ttl pk-yrs)     Types: Cigarettes    Smokeless tobacco: Never   Substance and Sexual Activity    Alcohol use: Not Currently    Drug use: Never    Sexual activity: Yes     Partners: Male     Birth control/protection: See Surgical Hx     Comment: Tubal Ligation -      Social Determinants of Health     Financial Resource Strain: Low Risk  (2024)    Overall Financial Resource Strain (CARDIA)     Difficulty of Paying Living Expenses: Not very hard   Food Insecurity: Food Insecurity Present (2024)    Hunger Vital Sign     Worried About Running Out of Food in the Last Year: Sometimes true     Ran Out of Food in the Last Year: Never true   Transportation Needs: Unmet  Transportation Needs (2/28/2024)    PRAPARE - Transportation     Lack of Transportation (Medical): Yes     Lack of Transportation (Non-Medical): No   Physical Activity: Insufficiently Active (2/28/2024)    Exercise Vital Sign     Days of Exercise per Week: 2 days     Minutes of Exercise per Session: 30 min   Stress: Stress Concern Present (2/28/2024)    [employer] - Occupational Stress Questionnaire     Feeling of Stress : Very much   Social Connections: Unknown (2/28/2024)    Social Connection and Isolation Panel [NHANES]     Frequency of Communication with Friends and Family: Three times a week     Frequency of Social Gatherings with Friends and Family: Once a week     Active Member of Clubs or Organizations: No     Attends Club or Organization Meetings: Never     Marital Status:    Housing Stability: Low Risk  (2/28/2024)    Housing Stability Vital Sign     Unable to Pay for Housing in the Last Year: No     Number of Places Lived in the Last Year: 1     Unstable Housing in the Last Year: No       Family History   Problem Relation Age of Onset    Breast cancer Paternal Grandmother     Cancer Paternal Grandmother     Breast cancer Maternal Grandmother     Cancer Maternal Grandmother     No Known Problems Father     Hypertension Mother     Depression Brother     Diabetes Maternal Uncle        Review of patient's allergies indicates:   Allergen Reactions    Sulfa (sulfonamide antibiotics) Hives    Betadine surgical scrub [povidone-iodine] Rash    Hibiclens (isopropyl alcohol) Rash          Current Outpatient Medications:     atorvastatin (LIPITOR) 10 MG tablet, Take 1 tablet (10 mg total) by mouth once daily., Disp: 90 tablet, Rfl: 3    ergocalciferol (ERGOCALCIFEROL) 50,000 unit Cap, Take 1 capsule (50,000 Units total) by mouth every 7 days., Disp: 13 capsule, Rfl: 3    nitrofurantoin, macrocrystal-monohydrate, (MACROBID) 100 MG capsule, Take 1 capsule (100 mg total) by mouth 2  (two) times daily., Disp: 20 capsule, Rfl: 0    ondansetron (ZOFRAN-ODT) 4 MG TbDL, Take 2 tablets (8 mg total) by mouth 2 (two) times daily., Disp: 30 tablet, Rfl: 3    prazosin (MINIPRESS) 1 MG Cap, Take 1 mg by mouth every evening., Disp: , Rfl:     QUEtiapine (SEROQUEL) 50 MG tablet, Take 1 tablet (50 mg total) by mouth every evening., Disp: 90 tablet, Rfl: 3    rizatriptan (MAXALT-MLT) 10 MG disintegrating tablet, Take 1 tablet (10 mg total) by mouth as needed for Migraine. May repeat in 2 hours if needed, Disp: 30 tablet, Rfl: 1    valACYclovir (VALTREX) 500 MG tablet, Take 1 tablet (500 mg total) by mouth 2 (two) times daily., Disp: 60 tablet, Rfl: 11    Ms. Perry is a 28-year-old female who was seen approximately 3 weeks ago to establish care.  She is here today with UTI and possible exposure to STD. She did a home UTI test       Review of Systems   Constitutional:  Negative for activity change, appetite change, chills, diaphoresis and fever.   HENT:  Negative for congestion, ear pain, postnasal drip, rhinorrhea and sore throat.    Respiratory:  Negative for cough and shortness of breath.    Cardiovascular:  Negative for chest pain, palpitations and leg swelling.   Gastrointestinal:  Negative for abdominal distention and abdominal pain.   Genitourinary:  Positive for dysuria, frequency, pelvic pain and urgency.   Skin:  Negative for color change, rash and wound.       Objective:      Physical Exam  Vitals and nursing note reviewed.   Constitutional:       Appearance: She is well-developed.   Cardiovascular:      Rate and Rhythm: Normal rate and regular rhythm.      Heart sounds: Normal heart sounds.   Pulmonary:      Effort: Pulmonary effort is normal. No respiratory distress.      Breath sounds: Normal breath sounds.   Abdominal:      General: Bowel sounds are normal. There is no distension.      Palpations: Abdomen is soft. There is no mass.      Tenderness: There is abdominal tenderness in the suprapubic  area. There is no guarding or rebound.      Hernia: No hernia is present.   Skin:     General: Skin is warm and dry.   Neurological:      Mental Status: She is alert and oriented to person, place, and time.         Assessment:       1. Exposure to STD    2. Acute cystitis without hematuria        Plan:         Exposure to STD  -     Urinalysis, Reflex to Urine Culture; Future; Expected date: 03/25/2024  -     Gonococcus, Amplified DNA [facility]; Urine; Future; Expected date: 03/25/2024  -     RPR; Future; Expected date: 03/25/2024  -     HIV 1/2 Ag/Ab (4th Gen); Future; Expected date: 03/25/2024  -     KOH prep; Future; Expected date: 03/25/2024  -     C. trachomatis/N. gonorrhoeae by AMP DNA [facility]; Urine; Future; Expected date: 03/25/2024    Acute cystitis without hematuria  -     nitrofurantoin, macrocrystal-monohydrate, (MACROBID) 100 MG capsule; Take 1 capsule (100 mg total) by mouth 2 (two) times daily.  Dispense: 20 capsule; Refill: 0        Risks, benefits, and side effects were discussed with the patient. All questions were answered to the fullest satisfaction of the patient, and pt verbalized understanding and agreement to treatment plan. Pt was to call with any new or worsening symptoms, or present to the ER.        Vy Jon MD

## 2024-03-26 LAB — RPR SER QL: NORMAL

## 2024-03-26 NOTE — TELEPHONE ENCOUNTER
See mychart message in regards to breast pain. Pt was here 3/15, 3/25, would you like her to come back in for eval?

## 2024-03-28 NOTE — TELEPHONE ENCOUNTER
----- Message from Pari Camacho sent at 5/24/2022 10:49 AM CDT -----  Regarding: pt called  Name of Who is Calling: KWABENA MARTE [[id number]]      What is the request in detail: pt is requesting to speak with the nurse Vikki , and wanted her to know that Dr Wood does not see patients for back pain. Please advise     Can the clinic reply by [facility]: No      [phone]      
Dr. Barton does not treat back pain. Patient will need a referral else where  
Pt notified, advised [facility] in [place]. Patient voiced understanding.   
Change in [facility]

## 2024-04-08 ENCOUNTER — PATIENT MESSAGE (OUTPATIENT)
Dept: FAMILY MEDICINE | Facility: CLINIC | Age: 29
End: 2024-04-08
Payer: COMMERCIAL

## 2024-04-08 DIAGNOSIS — N64.4 BREAST PAIN: Primary | ICD-10-CM

## 2024-04-09 ENCOUNTER — TELEPHONE (OUTPATIENT)
Dept: FAMILY MEDICINE | Facility: CLINIC | Age: 29
End: 2024-04-09
Payer: COMMERCIAL

## 2024-05-02 ENCOUNTER — HOSPITAL ENCOUNTER (OUTPATIENT)
Dept: RADIOLOGY | Facility: HOSPITAL | Age: 29
Discharge: HOME | End: 2024-05-02
Attending: FAMILY MEDICINE
Payer: COMMERCIAL

## 2024-05-02 DIAGNOSIS — N64.4 BREAST PAIN: ICD-10-CM

## 2024-05-02 PROCEDURE — 77062 BREAST TOMOSYNTHESIS BI: CPT | Mod: 26,,, | Performed by: RADIOLOGY

## 2024-05-02 PROCEDURE — 76642 ULTRASOUND BREAST LIMITED: CPT | Mod: TC,LT

## 2024-05-02 PROCEDURE — 77062 BREAST TOMOSYNTHESIS BI: CPT | Mod: TC

## 2024-05-02 PROCEDURE — 77066 DX MAMMO INCL CAD BI: CPT | Mod: 26,,, | Performed by: RADIOLOGY

## 2024-05-02 PROCEDURE — 76642 ULTRASOUND BREAST LIMITED: CPT | Mod: 26,LT,, | Performed by: RADIOLOGY

## 2024-06-10 ENCOUNTER — PATIENT MESSAGE (OUTPATIENT)
Dept: FAMILY MEDICINE | Facility: CLINIC | Age: 29
End: 2024-06-10
Payer: COMMERCIAL

## 2024-06-14 ENCOUNTER — OFFICE VISIT (OUTPATIENT)
Dept: FAMILY MEDICINE | Facility: CLINIC | Age: 29
End: 2024-06-14
Payer: COMMERCIAL

## 2024-06-14 DIAGNOSIS — E66.01 MORBID OBESITY: Primary | ICD-10-CM

## 2024-06-14 PROCEDURE — 99213 OFFICE O/P EST LOW 20 MIN: CPT | Mod: 95,,, | Performed by: FAMILY MEDICINE

## 2024-06-14 PROCEDURE — 3044F HG A1C LEVEL LT 7.0%: CPT | Mod: 95,,, | Performed by: FAMILY MEDICINE

## 2024-06-14 RX ORDER — TIRZEPATIDE 2.5 MG/.5ML
2.5 INJECTION, SOLUTION SUBCUTANEOUS
Qty: 12 PEN | Refills: 1 | Status: SHIPPED | OUTPATIENT
Start: 2024-06-14

## 2024-06-14 NOTE — PROGRESS NOTES
Subjective:       Patient ID: Gustavo Perry is a 29 y.o. female.    Chief Complaint: No chief complaint on file.      Past Medical History:   Diagnosis Date    Anxiety     Bipolar 1 disorder, depressed     Depression     Herpes simplex virus (HSV) infection     PTSD (post-traumatic stress disorder)        Past Surgical History:   Procedure Laterality Date     SECTION      CHOLECYSTECTOMY      ESOPHAGOGASTRODUODENOSCOPY N/A 2022    Procedure: EGD (ESOPHAGOGASTRODUODENOSCOPY);  Surgeon: Andrea Manning MD;  Location: [facility] ENDO;  Service: Endoscopy;  Laterality: N/A;    ESOPHAGOGASTRODUODENOSCOPY N/A 2023    Procedure: ESOPHAGOGASTRODUODENOSCOPY (EGD);  Surgeon: Eric Huston MD;  Location: [facility] ENDO;  Service: General;  Laterality: N/A;    LAPAROSCOPIC CHOLECYSTECTOMY Bilateral 2022    Procedure: CHOLECYSTECTOMY-LAPAROSCOPIC;  Surgeon: Eric Huston MD;  Location: [facility] OR;  Service: General;  Laterality: Bilateral;    TONSILLECTOMY, ADENOIDECTOMY      TUBAL LIGATION      WISDOM TOOTH EXTRACTION          Social History     Socioeconomic History    Marital status:    Tobacco Use    Smoking status: Every Day     Current packs/day: 1.00     Average packs/day: 1 pack/day for 15.0 years (15.0 ttl pk-yrs)     Types: Cigarettes    Smokeless tobacco: Never   Substance and Sexual Activity    Alcohol use: Not Currently    Drug use: Never    Sexual activity: Yes     Partners: Male     Birth control/protection: See Surgical Hx     Comment: Tubal Ligation -      Social Determinants of Health     Financial Resource Strain: Low Risk  (2024)    Overall Financial Resource Strain (CARDIA)     Difficulty of Paying Living Expenses: Not very hard   Food Insecurity: Food Insecurity Present (2024)    Hunger Vital Sign     Worried About Running Out of Food in the Last Year: Sometimes true     Ran Out of Food in the Last Year: Never true   Transportation Needs: Unmet  Transportation Needs (2/28/2024)    PRAPARE - Transportation     Lack of Transportation (Medical): Yes     Lack of Transportation (Non-Medical): No   Physical Activity: Insufficiently Active (2/28/2024)    Exercise Vital Sign     Days of Exercise per Week: 2 days     Minutes of Exercise per Session: 30 min   Stress: Stress Concern Present (2/28/2024)    [employer] - Occupational Stress Questionnaire     Feeling of Stress : Very much   Housing Stability: Low Risk  (2/28/2024)    Housing Stability Vital Sign     Unable to Pay for Housing in the Last Year: No     Number of Places Lived in the Last Year: 1     Unstable Housing in the Last Year: No       Family History   Problem Relation Name Age of Onset    Breast cancer Paternal Grandmother Winnie amos     Cancer Paternal Grandmother Winnie amos     Breast cancer Maternal Grandmother Dante benjamin     Cancer Maternal Grandmother Dante benjamin     No Known Problems Father      Hypertension Mother Alexia coronado     Depression Brother Jac amos     Diabetes Maternal Uncle         Review of patient's allergies indicates:   Allergen Reactions    Sulfa (sulfonamide antibiotics) Hives    Betadine surgical scrub [povidone-iodine] Rash    Hibiclens (isopropyl alcohol) Rash          Current Outpatient Medications:     atorvastatin (LIPITOR) 10 MG tablet, Take 1 tablet (10 mg total) by mouth once daily., Disp: 90 tablet, Rfl: 3    ergocalciferol (ERGOCALCIFEROL) 50,000 unit Cap, Take 1 capsule (50,000 Units total) by mouth every 7 days., Disp: 13 capsule, Rfl: 3    nitrofurantoin, macrocrystal-monohydrate, (MACROBID) 100 MG capsule, Take 1 capsule (100 mg total) by mouth 2 (two) times daily., Disp: 20 capsule, Rfl: 0    ondansetron (ZOFRAN-ODT) 4 MG TbDL, Take 2 tablets (8 mg total) by mouth 2 (two) times daily., Disp: 30 tablet, Rfl: 3    prazosin (MINIPRESS) 1 MG Cap, Take 1 mg by mouth every evening., Disp: , Rfl:     QUEtiapine (SEROQUEL)  "50 MG tablet, Take 1 tablet (50 mg total) by mouth every evening., Disp: 90 tablet, Rfl: 3    rizatriptan (MAXALT-MLT) 10 MG disintegrating tablet, Take 1 tablet (10 mg total) by mouth as needed for Migraine. May repeat in 2 hours if needed, Disp: 30 tablet, Rfl: 1    tirzepatide (MOUNJARO) 2.5 mg/0.5 mL PnIj, Inject 2.5 mg into the skin every 7 days., Disp: 12 Pen, Rfl: 1    valACYclovir (VALTREX) 500 MG tablet, Take 1 tablet (500 mg total) by mouth 2 (two) times daily., Disp: 60 tablet, Rfl: 11    Ms. Perry is a 29-year-old female who was concerned that she may have been missed diagnosed with bipolar disorder as she feels she has ADHD.  I encouraged her to find a psychologist or a psychiatrist that will take her insurance and I will be happy to send her a referral.  She states that she has 2 sons that are ADHD and she feels like her symptoms are more like her son's than what she has read about bipolar disorder.  She states that the  doctor who diagnosed her with bipolar disorder "only saw her for 5 minutes", prescribed her Vraylar, which made her cry all the time, so she quit taking it.    She also complained that she is now up to 220 lb and she has hungry all the time.  Her last labs were done in March in her hemoglobin A1c was well within the normal range.  She does state that her mother was diagnosed with prediabetes last week.  She also says that she has thirsty all the time.  I tried to reassure her that she likely does not have diabetes based on her most recent hemoglobin A1c.      Since she is gaining weight, she weighed 207 at her last doctor's appointment in March 20, 2024, and she states she weighs over 220 lb now, she would like to try Mounjaro.  Mounjaro has been sent to the pharmacy, and we will see what her insurance will do.      Review of Systems   Constitutional:  Positive for activity change and unexpected weight change.   HENT:  Negative for hearing loss, rhinorrhea and trouble swallowing.  "   Eyes:  Negative for discharge and visual disturbance.   Respiratory:  Negative for chest tightness and wheezing.    Cardiovascular:  Negative for chest pain and palpitations.   Gastrointestinal:  Negative for blood in stool, constipation, diarrhea and vomiting.   Endocrine: Positive for polydipsia and polyuria.   Genitourinary:  Negative for difficulty urinating, dysuria, hematuria and menstrual problem.   Musculoskeletal:  Positive for neck pain. Negative for arthralgias and joint swelling.   Neurological:  Negative for weakness and headaches.   Psychiatric/Behavioral:  Negative for confusion and dysphoric mood.        Objective:      Physical Exam  Constitutional:       Appearance: Normal appearance. She is obese.   HENT:      Head: Normocephalic.   Eyes:      Extraocular Movements: Extraocular movements intact.      Pupils: Pupils are equal, round, and reactive to light.   Neurological:      General: No focal deficit present.      Mental Status: She is alert and oriented to person, place, and time. Mental status is at baseline.   Psychiatric:         Mood and Affect: Mood normal.         Behavior: Behavior normal.         Assessment:       1. Morbid obesity        Plan:         Morbid obesity  -     tirzepatide (MOUNJARO) 2.5 mg/0.5 mL PnIj; Inject 2.5 mg into the skin every 7 days.  Dispense: 12 Pen; Refill: 1    This virtual visit was done with the patient at her home in [facility].  She was outside on her back porch smoking a cigarette throughout this virtual visit.  The face-to-face time with the patient was approximately 16 minutes.  The total time spent on this visit with documentation, chart review, lab review was 22 minutes    Risks, benefits, and side effects were discussed with the patient. All questions were answered to the fullest satisfaction of the patient, and pt verbalized understanding and agreement to treatment plan. Pt was to call with any new or worsening symptoms, or present to  the KRISTIN Jon MD

## 2024-06-24 ENCOUNTER — PATIENT MESSAGE (OUTPATIENT)
Dept: FAMILY MEDICINE | Facility: CLINIC | Age: 29
End: 2024-06-24
Payer: COMMERCIAL

## 2024-06-29 ENCOUNTER — PATIENT MESSAGE (OUTPATIENT)
Dept: FAMILY MEDICINE | Facility: CLINIC | Age: 29
End: 2024-06-29
Payer: COMMERCIAL

## 2024-07-22 ENCOUNTER — PATIENT MESSAGE (OUTPATIENT)
Dept: FAMILY MEDICINE | Facility: CLINIC | Age: 29
End: 2024-07-22
Payer: COMMERCIAL

## 2024-08-07 ENCOUNTER — PATIENT MESSAGE (OUTPATIENT)
Dept: FAMILY MEDICINE | Facility: CLINIC | Age: 29
End: 2024-08-07
Payer: COMMERCIAL

## 2024-08-13 ENCOUNTER — PATIENT MESSAGE (OUTPATIENT)
Dept: FAMILY MEDICINE | Facility: CLINIC | Age: 29
End: 2024-08-13
Payer: COMMERCIAL

## 2024-08-20 ENCOUNTER — OFFICE VISIT (OUTPATIENT)
Dept: FAMILY MEDICINE | Facility: CLINIC | Age: 29
End: 2024-08-20
Payer: COMMERCIAL

## 2024-08-20 ENCOUNTER — TELEPHONE (OUTPATIENT)
Dept: FAMILY MEDICINE | Facility: CLINIC | Age: 29
End: 2024-08-20
Payer: COMMERCIAL

## 2024-08-20 DIAGNOSIS — N92.0 MENORRHAGIA WITH REGULAR CYCLE: Primary | ICD-10-CM

## 2024-08-20 DIAGNOSIS — F31.9 BIPOLAR 1 DISORDER: ICD-10-CM

## 2024-08-20 DIAGNOSIS — G43.109 MIGRAINE WITH AURA AND WITHOUT STATUS MIGRAINOSUS, NOT INTRACTABLE: ICD-10-CM

## 2024-08-20 DIAGNOSIS — F41.9 ANXIETY: ICD-10-CM

## 2024-08-20 PROCEDURE — 3044F HG A1C LEVEL LT 7.0%: CPT | Mod: 95,,, | Performed by: FAMILY MEDICINE

## 2024-08-20 PROCEDURE — 99214 OFFICE O/P EST MOD 30 MIN: CPT | Mod: 95,,, | Performed by: FAMILY MEDICINE

## 2024-08-20 PROCEDURE — G2211 COMPLEX E/M VISIT ADD ON: HCPCS | Mod: 95,,, | Performed by: FAMILY MEDICINE

## 2024-08-20 RX ORDER — CITALOPRAM 10 MG/1
10 TABLET ORAL DAILY
Qty: 90 TABLET | Refills: 1 | Status: SHIPPED | OUTPATIENT
Start: 2024-08-20 | End: 2025-08-20

## 2024-08-20 RX ORDER — RIZATRIPTAN BENZOATE 10 MG/1
10 TABLET, ORALLY DISINTEGRATING ORAL
Qty: 27 TABLET | Refills: 3 | Status: SHIPPED | OUTPATIENT
Start: 2024-08-20 | End: 2024-09-19

## 2024-08-20 NOTE — PROGRESS NOTES
Subjective:       Patient ID: Gustavo Perry is a 29 y.o. female.    Chief Complaint: No chief complaint on file.      Past Medical History:   Diagnosis Date    Anxiety     Bipolar 1 disorder, depressed     Depression     Herpes simplex virus (HSV) infection     PTSD (post-traumatic stress disorder)        Past Surgical History:   Procedure Laterality Date     SECTION      CHOLECYSTECTOMY      ESOPHAGOGASTRODUODENOSCOPY N/A 2022    Procedure: EGD (ESOPHAGOGASTRODUODENOSCOPY);  Surgeon: Andrea Manning MD;  Location: [facility] ENDO;  Service: Endoscopy;  Laterality: N/A;    ESOPHAGOGASTRODUODENOSCOPY N/A 2023    Procedure: ESOPHAGOGASTRODUODENOSCOPY (EGD);  Surgeon: Eric Huston MD;  Location: [facility] ENDO;  Service: General;  Laterality: N/A;    LAPAROSCOPIC CHOLECYSTECTOMY Bilateral 2022    Procedure: CHOLECYSTECTOMY-LAPAROSCOPIC;  Surgeon: Eric Huston MD;  Location: [facility] OR;  Service: General;  Laterality: Bilateral;    TONSILLECTOMY, ADENOIDECTOMY      TUBAL LIGATION      WISDOM TOOTH EXTRACTION          Social History     Socioeconomic History    Marital status:    Tobacco Use    Smoking status: Every Day     Current packs/day: 1.00     Average packs/day: 1 pack/day for 15.0 years (15.0 ttl pk-yrs)     Types: Cigarettes    Smokeless tobacco: Never   Substance and Sexual Activity    Alcohol use: Not Currently    Drug use: Never    Sexual activity: Yes     Partners: Male     Birth control/protection: See Surgical Hx     Comment: Tubal Ligation -      Social Determinants of Health     Financial Resource Strain: Low Risk  (2024)    Overall Financial Resource Strain (CARDIA)     Difficulty of Paying Living Expenses: Not very hard   Food Insecurity: Food Insecurity Present (2024)    Hunger Vital Sign     Worried About Running Out of Food in the Last Year: Sometimes true     Ran Out of Food in the Last Year: Never true   Transportation Needs: Unmet  Transportation Needs (2/28/2024)    PRAPARE - Transportation     Lack of Transportation (Medical): Yes     Lack of Transportation (Non-Medical): No   Physical Activity: Insufficiently Active (2/28/2024)    Exercise Vital Sign     Days of Exercise per Week: 2 days     Minutes of Exercise per Session: 30 min   Stress: Stress Concern Present (2/28/2024)    [employer] - Occupational Stress Questionnaire     Feeling of Stress : Very much   Housing Stability: Low Risk  (2/28/2024)    Housing Stability Vital Sign     Unable to Pay for Housing in the Last Year: No     Number of Places Lived in the Last Year: 1     Unstable Housing in the Last Year: No       Family History   Problem Relation Name Age of Onset    Breast cancer Paternal Grandmother Winnie amos     Cancer Paternal Grandmother Winnie amos     Breast cancer Maternal Grandmother Dante benjamin     Cancer Maternal Grandmother Dante benjamin     No Known Problems Father      Hypertension Mother Alexia coronado     Depression Brother Jac amos     Diabetes Maternal Uncle         Review of patient's allergies indicates:   Allergen Reactions    Sulfa (sulfonamide antibiotics) Hives    Betadine surgical scrub [povidone-iodine] Rash    Hibiclens (isopropyl alcohol) Rash          Current Outpatient Medications:     atorvastatin (LIPITOR) 10 MG tablet, Take 1 tablet (10 mg total) by mouth once daily., Disp: 90 tablet, Rfl: 3    citalopram (CELEXA) 10 MG tablet, Take 1 tablet (10 mg total) by mouth once daily., Disp: 90 tablet, Rfl: 1    ergocalciferol (ERGOCALCIFEROL) 50,000 unit Cap, Take 1 capsule (50,000 Units total) by mouth every 7 days., Disp: 13 capsule, Rfl: 3    ondansetron (ZOFRAN-ODT) 4 MG TbDL, Take 2 tablets (8 mg total) by mouth 2 (two) times daily., Disp: 30 tablet, Rfl: 3    prazosin (MINIPRESS) 1 MG Cap, Take 1 mg by mouth every evening., Disp: , Rfl:     QUEtiapine (SEROQUEL) 50 MG tablet, Take 1 tablet (50 mg total) by  mouth every evening., Disp: 90 tablet, Rfl: 3    rizatriptan (MAXALT-MLT) 10 MG disintegrating tablet, Take 1 tablet (10 mg total) by mouth as needed for Migraine. May repeat in 2 hours if needed, Disp: 27 tablet, Rfl: 3    tirzepatide (MOUNJARO) 2.5 mg/0.5 mL PnIj, Inject 2.5 mg into the skin every 7 days., Disp: 12 Pen, Rfl: 1    Ms. Lisa Perry is a 29-year-old female with anxiety, bipolar disorder, depression, herpes simplex, PTSD and chronic fatigue.Her last labs were done 2024 and include the followin. CBC within acceptable range  2. Lipid panel with total cholesterol 236, HDL 43, , triglycerides 102, and a total cholesterol/HDL ratio of 5.5  3. Vitamin D3 20, vitamin B12 335  4. TSH 0.823  5. 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 She does feel like she's being selfish for feeling this way      Review of Systems   Constitutional:  Positive for unexpected weight change. Negative for activity change.   HENT:  Negative for hearing loss, rhinorrhea and trouble swallowing.    Eyes:  Negative for discharge and visual disturbance.   Respiratory:  Negative for chest tightness and wheezing.    Cardiovascular:  Negative for chest pain and palpitations.   Gastrointestinal:  Negative for blood in stool, constipation, diarrhea and vomiting.   Endocrine: Negative for polydipsia and polyuria.   Genitourinary:  Negative for difficulty urinating, dysuria, hematuria and menstrual problem.   Musculoskeletal:  Positive for neck pain. Negative for arthralgias and joint swelling.   Neurological:  Positive for headaches. Negative for weakness.   Psychiatric/Behavioral:  Negative for confusion and dysphoric mood.        Objective:      Physical Exam  Constitutional:       Appearance: Normal appearance. She is obese.   HENT:      Head: Normocephalic and  atraumatic.   Eyes:      Pupils: Pupils are equal, round, and reactive to light.   Neurological:      General: No focal deficit present.      Mental Status: She is alert and oriented to person, place, and time. Mental status is at baseline.   Psychiatric:      Comments: DEPRESSION/ANXIETY         Assessment:       1. Menorrhagia with regular cycle    2. Anxiety    3. Bipolar 1 disorder    4. Migraine with aura and without status migrainosus, not intractable        Plan:       Anxiety  -     Ambulatory referral/consult to Psychiatry; Future; Expected date: 08/27/2024  -     TSH; Future; Expected date: 08/20/2024  -     T4, free; Future; Expected date: 08/20/2024  -     Cortisol, 8AM; Future; Expected date: 08/20/2024  -     citalopram (CELEXA) 10 MG tablet; Take 1 tablet (10 mg total) by mouth once daily.  Dispense: 90 tablet; Refill: 1    Bipolar 1 disorder  -     Ambulatory referral/consult to Psychiatry; Future; Expected date: 08/27/2024    Migraine with aura and without status migrainosus, not intractable  -     rizatriptan (MAXALT-MLT) 10 MG disintegrating tablet; Take 1 tablet (10 mg total) by mouth as needed for Migraine. May repeat in 2 hours if needed  Dispense: 27 tablet; Refill: 3    Face to face 30 min with patient at home in [place], [place] out of total  45 minutes spent with review of chart, including prior labs and lab results, medications including refills ordered today, review of allergies and dictation in documentation of this face-to-face virtual visit      Risks, benefits, and side effects were discussed with the patient. All questions were answered to the fullest satisfaction of the patient, and pt verbalized understanding and agreement to treatment plan. Pt was to call with any new or worsening symptoms, or present to the ER.        Vy Jon MD

## 2024-08-20 NOTE — TELEPHONE ENCOUNTER
Spoke with patient regarding virtual visit request of lab request. Patient informed me that' she wishes for her cortisol levels to be checked. Informed patient that Dr Jon is in with her last clinic patient and would be online shortly to complete the appt. Patient voiced understanding

## 2024-08-22 ENCOUNTER — PATIENT MESSAGE (OUTPATIENT)
Dept: FAMILY MEDICINE | Facility: CLINIC | Age: 29
End: 2024-08-22
Payer: COMMERCIAL

## 2024-09-14 ENCOUNTER — PATIENT MESSAGE (OUTPATIENT)
Dept: FAMILY MEDICINE | Facility: CLINIC | Age: 29
End: 2024-09-14
Payer: COMMERCIAL

## 2024-09-16 ENCOUNTER — PATIENT MESSAGE (OUTPATIENT)
Dept: FAMILY MEDICINE | Facility: CLINIC | Age: 29
End: 2024-09-16
Payer: COMMERCIAL

## 2025-01-10 ENCOUNTER — OFFICE VISIT (OUTPATIENT)
Dept: FAMILY MEDICINE | Facility: CLINIC | Age: 30
End: 2025-01-10
Payer: COMMERCIAL

## 2025-01-10 DIAGNOSIS — F41.9 ANXIETY: ICD-10-CM

## 2025-01-10 RX ORDER — BUSPIRONE HYDROCHLORIDE 15 MG/1
15 TABLET ORAL 3 TIMES DAILY PRN
Qty: 90 TABLET | Refills: 5 | Status: SHIPPED | OUTPATIENT
Start: 2025-01-10 | End: 2026-01-10

## 2025-01-10 RX ORDER — CITALOPRAM 20 MG/1
20 TABLET, FILM COATED ORAL DAILY
Qty: 90 TABLET | Refills: 1 | Status: SHIPPED | OUTPATIENT
Start: 2025-01-10 | End: 2026-01-10

## 2025-01-10 RX ORDER — CITALOPRAM 20 MG/1
10 TABLET, FILM COATED ORAL DAILY
Qty: 90 TABLET | Refills: 1 | Status: SHIPPED | OUTPATIENT
Start: 2025-01-10 | End: 2025-01-10

## 2025-01-10 NOTE — PROGRESS NOTES
Patient ID: Gustavo Perry is a 29 y.o. female.    Chief Complaint: No chief complaint on file.    History of Present Illness    CHIEF COMPLAINT:  Gustavo presents today for chronic anxiety management.    ANXIETY:  She experiences chronic anxiety severe enough to cause trembling during extreme anxiety or panic attacks. She requests an increase in Celexa from 10 mg to 20 mg and additional medication for severe anxiety episodes. She was referred to psychiatry but has not yet seen a psychiatrist. She expresses concern about high cortisol levels causing her severe anxiety episodes.    MEDICAL HISTORY:  She has a history of bipolar 1 disorder.    CURRENT MEDICATIONS:  She takes Celexa 10 mg for anxiety, Atorvastatin 10 mg for cholesterol, Vitamin D 50,000 units weekly, Zofran for occasional nausea, Seroquel 50 mg for sleep, Maxalt for migraine headaches, and Prazosin.    LABS:  CBC was within normal range. Vitamin B12 was 335. Lipid panel showed high total cholesterol at 236, high total cholesterol HDL ratio at 5.5, HDL at 43, LDL at 173, and triglycerides at 102. Vitamin D was low at 20. Hemoglobin A1C was 5.1%. TSH was in the low normal range at 0.823, and free T4 was 0.84.      ROS:  ROS as indicated in HPI.         Physical Exam    General: No acute distress. Well-developed. Well-nourished.  Eyes: EOMI. Sclerae anicteric.  HENT: Normocephalic. Atraumatic. Nares patent. Moist oral mucosa.  Cardiovascular: Regular rate. Regular rhythm. No murmurs. No rubs. No gallops. Normal S1, S2.  Respiratory: Normal respiratory effort. Clear to auscultation bilaterally. No rales. No rhonchi. No wheezing.  Musculoskeletal: No  obvious deformity.  Extremities: No lower extremity edema.  Neurological: Alert & oriented x3. No slurred speech. Normal gait.  Psychiatric: Normal mood. Normal affect. Good insight. Good judgment.  Skin: Warm. Dry. No rash.         Assessment & Plan    IMPRESSION:  - Assessed chronic anxiety and recent severe  episodes, considering history of bipolar 1 disorder  - Evaluated current medication regimen and determined need for adjustment due to increased anxiety symptoms  - Will increase Celexa dosage and add Buspar for acute anxiety management  - Noted pending psychiatry referral from previous visit  - Acknowledged patient's concern about potential elevated cortisol levels contributing to anxiety, with cortisol level test still pending    ANXIETY DISORDER:  - Evaluated the patient's chronic anxiety condition, noting severe anxiety or panic attack moments with trembling.  - Assessed current medication regimen, including Celexa 10 mg for anxiety.  - Increased Celexa dosage from 10 mg to 20 mg daily.  - Prescribed Buspar (buspirone) 15 mg up to 3 times daily for acute anxiety management.  - Explained Buspar as an acute anxiety medication that can be taken in addition to Celexa.  - Discussed various dosage options for Buspar and rationale for starting at a higher dose for high anxiety.  - Referred the patient to psychiatry due to severe anxiety, with appointment scheduled for August 2024.  - Ordered cortisol level test to investigate potential correlation with anxiety episodes.  - Instructed the patient to follow up after psychiatry appointment to reassess anxiety management plan.    BIPOLAR 1 DISORDER:  - Monitored the patient's bipolar 1 disorder.  - Continued prescription of Seroquel 50 mg for sleep, noting its potential benefit in managing bipolar disorder.    HYPERLIPIDEMIA:  - Reviewed recent lipid panel results: total cholesterol 236, total cholesterol/HDL ratio 5.5, HDL 43, , and triglycerides 102.  - Continued prescription of Atorvastatin 10 mg for cholesterol management.    VITAMIN D DEFICIENCY:  - Noted low Vitamin D level of 20 in the last lab test on March 1, 2024.  - Prescribed Vitamin D 50,000 units weekly to address deficiency.    MIGRAINE:  - Continued prescription of Maxalt for management of migraine  headaches.    NAUSEA:  - Prescribed Zofran for management of occasional nausea.    SLEEP DISORDER:  - Continued prescription of Seroquel 50 mg for sleep management.    POST-TRAUMATIC STRESS DISORDER (PTSD):  - Continued prescription of Prazosin, noting its potential benefit in managing nightmares associated with PTSD, often used in conjunction with opiate analgesics.         Plan:          Anxiety  -     Discontinue: citalopram (CELEXA) 20 MG tablet; Take 0.5 tablets (10 mg total) by mouth once daily.  Dispense: 90 tablet; Refill: 1  -     busPIRone (BUSPAR) 15 MG tablet; Take 1 tablet (15 mg total) by mouth 3 (three) times daily as needed (Anxiety).  Dispense: 90 tablet; Refill: 5  -     citalopram (CELEXA) 20 MG tablet; Take 1 tablet (20 mg total) by mouth once daily.  Dispense: 90 tablet; Refill: 1    Face-to-face time with Lisa was 13 minutes but there was no connection on her end and she could not hear me.  I called her on the telephone in the audio part was done via telephone as she had poor service in [facility] all she was traveling in her car.  A total of 31 minutes was utilized in reviewing this patient's chart, reviewing her drug allergies and her medication list as well as her last 2 office visits, her last lab review, prescribing medications and in documenting this audiovisual note    Follow up in about 3 months (around 4/10/2025), or if symptoms worsen or fail to improve.    This note was generated with the assistance of ambient listening technology. Verbal consent was obtained by the patient and accompanying visitor(s) for the recording of patient appointment to facilitate this note. I attest to having reviewed and edited the generated note for accuracy, though some syntax or spelling errors may persist. Please contact the author of this note for any clarification.

## 2025-03-21 DIAGNOSIS — E55.9 VITAMIN D DEFICIENCY: ICD-10-CM

## 2025-03-21 RX ORDER — ERGOCALCIFEROL 1.25 MG/1
50000 CAPSULE ORAL
Qty: 13 CAPSULE | Refills: 3 | Status: SHIPPED | OUTPATIENT
Start: 2025-03-21

## 2025-03-21 NOTE — TELEPHONE ENCOUNTER
Care Due:                  Date            Visit Type   Department     Provider  --------------------------------------------------------------------------------                                ESTABLISHED                              PATIENT -    [facility]  [facility]  Next Visit: None Scheduled  None         None Found                                                            Last  Test          Frequency    Reason                     Performed    Due Date  --------------------------------------------------------------------------------    CMP.........  12 months..  atorvastatin,              Not Found    Overdue                             ergocalciferol...........    Lipid Panel.  12 months..  atorvastatin.............  03- 02-    Vitamin D...  12 months..  ergocalciferol...........  03- 02-    [facility] Embedded Care Due Messages. Reference number: [id number].   3/21/2025 10:04:35 AM CDT

## 2025-03-21 NOTE — TELEPHONE ENCOUNTER
Refill Routing Note   Medication(s) are not appropriate for processing by [facility] for the following reason(s):        Outside of protocol    [facility] action(s):  Route               Appointments  past 12m or future 3m with PCP    Date Provider   Last Visit   1/10/2025 Vy Jon MD   Next Visit   Visit date not found Vy Jon MD   ED visits in past 90 days: 0        Note composed:4:09 PM 03/21/2025

## 2025-03-31 ENCOUNTER — PATIENT MESSAGE (OUTPATIENT)
Dept: FAMILY MEDICINE | Facility: CLINIC | Age: 30
End: 2025-03-31
Payer: COMMERCIAL

## 2025-04-09 ENCOUNTER — TELEPHONE (OUTPATIENT)
Dept: FAMILY MEDICINE | Facility: CLINIC | Age: 30
End: 2025-04-09

## 2025-04-09 NOTE — TELEPHONE ENCOUNTER
Called patient to inquire about rescheduling missed appointment this morning, no answer, left voicemail for patient and sending missed appointment letter to patients address on file.

## 2025-04-09 NOTE — LETTER
April 9, 2025    Gustavo Perry  [address] [place] [address]             [facility]  [address]  [address]  [place] [facility] [place] [address]  Phone: [phone]  Fax: [phone] Dear Mrs. Gustavo Perry:    We are sorry that you missed your appointment with Dr. Jon on 4/9/2025. Your health and follow-up medical care are important to us. Please call our office as soon as possible so that we may reschedule your appointment. If you have already rescheduled your appointment, please disregard this letter.    Sincerely,        Vy Jon MD

## 2025-05-03 ENCOUNTER — HOSPITAL ENCOUNTER (EMERGENCY)
Facility: HOSPITAL | Age: 30
Discharge: HOME | End: 2025-05-03
Attending: EMERGENCY MEDICINE
Payer: MEDICAID

## 2025-05-03 VITALS
HEART RATE: 68 BPM | HEIGHT: 66 IN | TEMPERATURE: 98 F | WEIGHT: 203.38 LBS | RESPIRATION RATE: 17 BRPM | BODY MASS INDEX: 32.68 KG/M2 | OXYGEN SATURATION: 99 % | DIASTOLIC BLOOD PRESSURE: 87 MMHG | SYSTOLIC BLOOD PRESSURE: 123 MMHG

## 2025-05-03 DIAGNOSIS — H10.9 CONJUNCTIVITIS OF RIGHT EYE, UNSPECIFIED CONJUNCTIVITIS TYPE: Primary | ICD-10-CM

## 2025-05-03 LAB
HCV AB SERPL QL IA: NORMAL
HIV 1+2 AB+HIV1 P24 AG SERPL QL IA: NORMAL

## 2025-05-03 PROCEDURE — 99283 EMERGENCY DEPT VISIT LOW MDM: CPT

## 2025-05-03 PROCEDURE — 86803 HEPATITIS C AB TEST: CPT | Performed by: EMERGENCY MEDICINE

## 2025-05-03 PROCEDURE — 87389 HIV-1 AG W/HIV-1&-2 AB AG IA: CPT | Performed by: EMERGENCY MEDICINE

## 2025-05-03 RX ORDER — ERYTHROMYCIN 5 MG/G
OINTMENT OPHTHALMIC
Qty: 3.5 G | Refills: 3 | Status: SHIPPED | OUTPATIENT
Start: 2025-05-03 | End: 2025-05-10

## 2025-05-03 NOTE — ED PROVIDER NOTES
"Encounter Date: 5/3/2025       History     Chief Complaint   Patient presents with    Eye Problem     Pt presents to the ed with c/o right eye pain and swelling that started yesterday. Pt reports minimal change in vision. Pt states she has a "little" blurred vision when looking to the left.      POV to ED alone.  Patient complains of right upper eyelid puffiness, itching, tenderness onset yesterday.  Denies injury.  No visual disturbance.  No contact use.  No other complaints    The history is provided by the patient. No  was used.     Review of patient's allergies indicates:   Allergen Reactions    Sulfa (sulfonamide antibiotics) Hives    Betadine surgical scrub [povidone-iodine] Rash    Hibiclens (isopropyl alcohol) Rash     Past Medical History:   Diagnosis Date    Anxiety     Bipolar 1 disorder, depressed     Depression     Herpes simplex virus (HSV) infection     PTSD (post-traumatic stress disorder)      Past Surgical History:   Procedure Laterality Date     SECTION      CHOLECYSTECTOMY      ESOPHAGOGASTRODUODENOSCOPY N/A 2022    Procedure: EGD (ESOPHAGOGASTRODUODENOSCOPY);  Surgeon: Andrea Manning MD;  Location: [facility] ENDO;  Service: Endoscopy;  Laterality: N/A;    ESOPHAGOGASTRODUODENOSCOPY N/A 2023    Procedure: ESOPHAGOGASTRODUODENOSCOPY (EGD);  Surgeon: Eric Huston MD;  Location: [facility] ENDO;  Service: General;  Laterality: N/A;    LAPAROSCOPIC CHOLECYSTECTOMY Bilateral 2022    Procedure: CHOLECYSTECTOMY-LAPAROSCOPIC;  Surgeon: Eric Huston MD;  Location: [facility] OR;  Service: General;  Laterality: Bilateral;    TONSILLECTOMY, ADENOIDECTOMY      TUBAL LIGATION      WISDOM TOOTH EXTRACTION       Family History   Problem Relation Name Age of Onset    Breast cancer Paternal Grandmother Winnie amos     Cancer Paternal Grandmother Winnie amos     Breast cancer Maternal Grandmother Dante benjamin     Cancer Maternal Grandmother Dante benjamin     No " Known Problems Father      Hypertension Mother Alexia coronado     Depression Brother Jac amos     Diabetes Maternal Uncle       Social History[1]  Review of Systems   Constitutional:  Negative for chills and fever.   Eyes:  Positive for itching. Negative for discharge and visual disturbance.        Right upper eyelid puffy   All other systems reviewed and are negative.      Physical Exam     Initial Vitals [05/03/25 1056]   BP Pulse Resp Temp SpO2   138/82 83 17 98.2 °F (36.8 °C) 99 %      MAP       --         Physical Exam    Nursing note and vitals reviewed.  Constitutional: She appears well-developed and well-nourished. No distress.   HENT:   Head: Normocephalic and atraumatic. Mouth/Throat: Oropharynx is clear and moist.   Eyes: EOM are normal. Pupils are equal, round, and reactive to light.   Right upper eyelid with mild puffiness, no redness, no induration.  No appreciated lesion, no stye.  No drainage. Sclera WNL   Neck:   Normal range of motion.  Cardiovascular:  Normal rate and regular rhythm.           Pulmonary/Chest: Breath sounds normal. No respiratory distress.   Abdominal: Abdomen is soft.   Musculoskeletal:         General: Normal range of motion.      Cervical back: Normal range of motion.     Neurological: She is alert and oriented to person, place, and time. GCS score is 15. GCS eye subscore is 4. GCS verbal subscore is 5. GCS motor subscore is 6.   Skin: Skin is warm and dry. Capillary refill takes less than 2 seconds.   Psychiatric: She has a normal mood and affect. Thought content normal.         ED Course   Procedures  Labs Reviewed   HEPATITIS C ANTIBODY   HIV 1 / 2 ANTIBODY          Imaging Results    None          Medications - No data to display  Medical Decision Making  Presents for evaluation of right eyelid puffiness, see HPI  Differentials include but not limited to folliculitis, stye, abscess, cellulitis, conjunctivitis  Discharged home, diagnosis conjunctivitis.  Discussed with  patient allergic versus bacterial component.  Medications for home use.  Instructed to Rest, increase fluids, lots of water and liquids.  Tylenol and or Motrin as needed.  Cool compresses as needed, call clinic for office recheck.  Return as needed.  Keep the eyes clean and dry. Agrees with care    Risk  OTC drugs.  Prescription drug management.                                      Clinical Impression:  Final diagnoses:  [H10.9] Conjunctivitis of right eye, unspecified conjunctivitis type (Primary)          ED Disposition Condition    Discharge Stable          ED Prescriptions       Medication Sig Dispense Start Date End Date Auth. Provider    naphazoline-pheniramine 0.025-0.3% (NAPHCON-A) 0.025-0.3 % ophthalmic solution Place 1 drop into the right eye every 6 (six) hours as needed (itching). 15 mL 5/3/2025 5/17/2025 Mervat Casiano NP    erythromycin (ROMYCIN) ophthalmic ointment Place into the left eye 5 (five) times daily. for 7 days 3.5 g 5/3/2025 5/10/2025 Mervat Casiano NP          Follow-up Information       Follow up With Specialties Details Why Contact Info    Vy Jon MD Family Medicine Call in 3 days  [address] [place] [address] [phone]               Mervat Casiano NP  05/03/25 1124         [1]   Social History  Tobacco Use    Smoking status: Every Day     Current packs/day: 1.00     Average packs/day: 1 pack/day for 15.0 years (15.0 ttl pk-yrs)     Types: Cigarettes    Smokeless tobacco: Never   Substance Use Topics    Alcohol use: Not Currently    Drug use: Never        Mervat Casiano NP  05/03/25 5892

## 2025-05-03 NOTE — DISCHARGE INSTRUCTIONS
Rest, increase fluids, lots of water and liquids.  Tylenol and or Motrin as needed.  Cool compresses as needed, call clinic for office recheck.  Return as needed.  Keep the eyes clean and dry

## 2025-05-05 ENCOUNTER — TELEPHONE (OUTPATIENT)
Dept: FAMILY MEDICINE | Facility: CLINIC | Age: 30
End: 2025-05-05
Payer: MEDICAID

## 2025-05-05 NOTE — TELEPHONE ENCOUNTER
Attempted to contact Gustavo Perry to discuss Scheduling an appointment.    Left voice mail to return our call at [phone]   Vikki Stringre LPN

## 2025-05-22 ENCOUNTER — PATIENT MESSAGE (OUTPATIENT)
Dept: FAMILY MEDICINE | Facility: CLINIC | Age: 30
End: 2025-05-22
Payer: MEDICAID

## 2025-05-22 DIAGNOSIS — E78.00 HYPERCHOLESTEROLEMIA: ICD-10-CM

## 2025-05-22 DIAGNOSIS — F41.9 ANXIETY: ICD-10-CM

## 2025-05-22 RX ORDER — ATORVASTATIN CALCIUM 10 MG/1
10 TABLET, FILM COATED ORAL DAILY
Qty: 90 TABLET | Refills: 3 | Status: SHIPPED | OUTPATIENT
Start: 2025-05-22 | End: 2026-05-22

## 2025-05-22 RX ORDER — BUSPIRONE HYDROCHLORIDE 15 MG/1
15 TABLET ORAL 3 TIMES DAILY PRN
Qty: 90 TABLET | Refills: 5 | Status: SHIPPED | OUTPATIENT
Start: 2025-05-22 | End: 2026-05-22

## 2025-05-22 NOTE — TELEPHONE ENCOUNTER
Care Due:                  Date            Visit Type   Department     Provider  --------------------------------------------------------------------------------                                ESTABLISHED                              PATIENT -    [facility] 2ND FLOOR  [facility]  Next Visit: None Scheduled  None         None Found                                                            Last  Test          Frequency    Reason                     Performed    Due Date  --------------------------------------------------------------------------------    CMP.........  12 months..  atorvastatin,              Not Found    Overdue                             ergocalciferol...........    Lipid Panel.  12 months..  atorvastatin.............  03- 02-    Vitamin D...  12 months..  ergocalciferol...........  03- 02-    [facility] Embedded Care Due Messages. Reference number: [id number].   5/22/2025 11:49:32 AM CDT

## 2025-07-11 DIAGNOSIS — F41.9 ANXIETY: ICD-10-CM

## 2025-07-11 RX ORDER — CITALOPRAM 20 MG/1
TABLET ORAL
Qty: 90 TABLET | Refills: 1 | Status: SHIPPED | OUTPATIENT
Start: 2025-07-11

## 2025-07-11 NOTE — TELEPHONE ENCOUNTER
"Refill Routing Note   Medication(s) are not appropriate for processing by [facility] for the following reason(s):        Allergy or intolerance    [facility] action(s):  Defer        Medication Therapy Plan: ED 5/3/25 for "pink eye". No chg to Celexa, ronald    Pharmacist review requested: Yes     Appointments  past 12m or future 3m with PCP    Date Provider   Last Visit   1/10/2025 Vy Jon MD   Next Visit   Visit date not found Vy Jon MD   ED visits in past 90 days: 1        Note composed:3:56 PM 07/11/2025          "

## 2025-07-11 NOTE — TELEPHONE ENCOUNTER
" Refill Decision Note   Gustavo Perry  is requesting a refill authorization.  Brief Assessment and Rationale for Refill:  Approve     Medication Therapy Plan: ED 5/3/25 for "pink eye". No chg to Celexa, unrelated      Pharmacist review requested: Yes   Comments:     Note composed:6:37 PM 07/11/2025            "

## 2025-07-11 NOTE — TELEPHONE ENCOUNTER
No care due was identified.  [facility] Embedded Care Due Messages. Reference number: [id number].   7/11/2025 7:27:57 AM CDT

## 2025-08-06 PROBLEM — Z01.419 WELL WOMAN EXAM WITH ROUTINE GYNECOLOGICAL EXAM: Status: RESOLVED | Noted: 2020-12-22 | Resolved: 2025-08-06

## 2025-08-06 PROBLEM — N89.8 VAGINAL DISCHARGE: Status: RESOLVED | Noted: 2023-09-12 | Resolved: 2025-08-06

## 2025-08-06 PROBLEM — B96.89 BACTERIAL VAGINOSIS: Status: RESOLVED | Noted: 2023-09-12 | Resolved: 2025-08-06

## 2025-08-06 PROBLEM — N76.0 BACTERIAL VAGINOSIS: Status: RESOLVED | Noted: 2023-09-12 | Resolved: 2025-08-06

## 2025-08-06 PROBLEM — N94.10 DYSPAREUNIA IN FEMALE: Status: RESOLVED | Noted: 2023-09-12 | Resolved: 2025-08-06

## 2025-08-06 PROBLEM — N92.0 MENORRHAGIA WITH REGULAR CYCLE: Status: RESOLVED | Noted: 2023-09-12 | Resolved: 2025-08-06

## 2025-08-06 PROBLEM — N94.6 DYSMENORRHEA: Status: RESOLVED | Noted: 2023-09-12 | Resolved: 2025-08-06

## 2025-08-29 ENCOUNTER — HOSPITAL ENCOUNTER (OUTPATIENT)
Dept: RADIOLOGY | Facility: HOSPITAL | Age: 30
Discharge: HOME | End: 2025-08-29
Attending: NURSE PRACTITIONER
Payer: COMMERCIAL

## (undated) DEVICE — TUBING PNEUMO

## (undated) DEVICE — SCISSOR TIP ENDOCUT DISPOSABLE

## (undated) DEVICE — SOL WATER STRL IRR 1000ML

## (undated) DEVICE — ADHESIVE DERMABOND ADVANCED

## (undated) DEVICE — CANISTER SUCTION 3000CC

## (undated) DEVICE — SEE MEDLINE ITEM 157116

## (undated) DEVICE — TROCAR KII BLLN 12MM 10CM

## (undated) DEVICE — UNDERGLOVE BIOGEL PI SZ 6.5 LF

## (undated) DEVICE — SUT CTD VICRYL 4-0 BR PS-2

## (undated) DEVICE — FORCEP BIOSY RJ 4 HOT 2.2X240

## (undated) DEVICE — GLOVE SURG ULTRA TOUCH 7.5

## (undated) DEVICE — SPONGE LAP 18X18 PREWASHED

## (undated) DEVICE — GLOVE SURG ULTRA TOUCH 7

## (undated) DEVICE — KIT DEFENDO VLV  AIR WATER SUC

## (undated) DEVICE — SYR ONLY LUER LOCK 20CC

## (undated) DEVICE — SOL CLEARIFY VISUALIZATION LAP

## (undated) DEVICE — CONTAINER SPECIMEN STRL 4OZ

## (undated) DEVICE — KIT CANIST SUCTION 1200CC

## (undated) DEVICE — SOL IRR NACL .9% 3000ML

## (undated) DEVICE — SUT 0 VICRYL / UR6 (J603)

## (undated) DEVICE — TOWEL OR DISP STRL BLUE 4/PK

## (undated) DEVICE — BLADE EZ CLEAN 2.5IN MODIFIED

## (undated) DEVICE — NDL ECLIPSE SAFETY 18GX1-1/2IN

## (undated) DEVICE — BITE BLOCK ADULT LATEX FREE

## (undated) DEVICE — GLOVE SURGEONS ULTRA TOUCH 6.5

## (undated) DEVICE — APPLIER CLIP ENDO LIGAMAX 5MM

## (undated) DEVICE — CANNULA LAP SEAL Z THRD 5X100

## (undated) DEVICE — TROCAR ENDO Z THREAD KII 5X100

## (undated) DEVICE — NDL BOX COUNTER

## (undated) DEVICE — GOWN SURGICAL XX LARGE X LONG

## (undated) DEVICE — FILTER LAPAROSCOPIC SMOKE EVAC

## (undated) DEVICE — UNDERGLOVES BIOGEL PI SZ 7 LF

## (undated) DEVICE — STAPLER SKIN ROTATING HEAD WIDE PRW35

## (undated) DEVICE — IRRIGATOR SUCTION W/TIP

## (undated) DEVICE — Device

## (undated) DEVICE — NDL SAFETY 25G X 1.5 ECLIPSE

## (undated) DEVICE — KIT ENDO CARRY-ON PROC 100310

## (undated) DEVICE — SUT CTD VICRYL 3-0 CR/SH

## (undated) DEVICE — PENCIL ROCKER SWITCH 10FT CORD

## (undated) DEVICE — COVER LIGHT HANDLE 80/CA

## (undated) DEVICE — DRAPE ABDOMINAL TIBURON 14X11

## (undated) DEVICE — PAD ABD 8X10 STERILE

## (undated) DEVICE — ELECTRODE LAP WIRE J-HOOK 36CM

## (undated) DEVICE — SYR B-D DISP CONTROL 10CC100/C

## (undated) DEVICE — BAG TISS RETRV MONARCH 10MM

## (undated) DEVICE — BLADE SURG STAINLESS STEEL #11